# Patient Record
Sex: FEMALE | Race: WHITE
[De-identification: names, ages, dates, MRNs, and addresses within clinical notes are randomized per-mention and may not be internally consistent; named-entity substitution may affect disease eponyms.]

---

## 2019-08-19 ENCOUNTER — HOSPITAL ENCOUNTER (EMERGENCY)
Dept: HOSPITAL 97 - ER | Age: 44
Discharge: HOME | End: 2019-08-19
Payer: COMMERCIAL

## 2019-08-19 DIAGNOSIS — Z88.1: ICD-10-CM

## 2019-08-19 DIAGNOSIS — R07.9: Primary | ICD-10-CM

## 2019-08-19 DIAGNOSIS — Z91.09: ICD-10-CM

## 2019-08-19 DIAGNOSIS — Z88.6: ICD-10-CM

## 2019-08-19 DIAGNOSIS — M79.7: ICD-10-CM

## 2019-08-19 LAB
BUN BLD-MCNC: 12 MG/DL (ref 7–18)
GLUCOSE SERPLBLD-MCNC: 147 MG/DL (ref 74–106)
HCT VFR BLD CALC: 38.4 % (ref 36–45)
LYMPHOCYTES # SPEC AUTO: 3.1 K/UL (ref 0.7–4.9)
PMV BLD: 8.8 FL (ref 7.6–11.3)
POTASSIUM SERPL-SCNC: 2.9 MMOL/L (ref 3.5–5.1)
RBC # BLD: 4.84 M/UL (ref 3.86–4.86)
TROPONIN (EMERG DEPT USE ONLY): < 0.02 NG/ML (ref 0–0.04)

## 2019-08-19 PROCEDURE — 84484 ASSAY OF TROPONIN QUANT: CPT

## 2019-08-19 PROCEDURE — 96375 TX/PRO/DX INJ NEW DRUG ADDON: CPT

## 2019-08-19 PROCEDURE — 80048 BASIC METABOLIC PNL TOTAL CA: CPT

## 2019-08-19 PROCEDURE — 71045 X-RAY EXAM CHEST 1 VIEW: CPT

## 2019-08-19 PROCEDURE — 96374 THER/PROPH/DIAG INJ IV PUSH: CPT

## 2019-08-19 PROCEDURE — 99285 EMERGENCY DEPT VISIT HI MDM: CPT

## 2019-08-19 PROCEDURE — 85025 COMPLETE CBC W/AUTO DIFF WBC: CPT

## 2019-08-19 PROCEDURE — 36415 COLL VENOUS BLD VENIPUNCTURE: CPT

## 2019-08-19 PROCEDURE — 93005 ELECTROCARDIOGRAM TRACING: CPT

## 2019-08-19 NOTE — XMS REPORT
Continuity of Care Document

 Created on:2014



Patient:ABHIJIT ARCOS

Sex:Female

:1975

External Reference #:[id number]





Demographics







 Address  [address] [place] [address]

 

 Phone  [phone]

 

 Preferred Language  Unknown

 

 Marital Status  Never 

 

 Oriental orthodox Affiliation  Unknown

 

 Race  Unknown

 

 Ethnic Group  Unknown









Author







 Organization  [facility]









Care Team Providers







 Name  Role  Phone

 

 MD Rashad, Lilliam  Unavailable  Unavailable









Insurance Providers







 Payer name  Policy type /  Policy ID  Covered party ID  Policy Rich



   Coverage type      

 

 [facility]        



 [id number]        

 

 AETNA PPO PRIMARY [id number]        

 

 AETNA (PPO)        

 

 AETNA (PPO)        

 

 *SELF PAY*        

 

 AETNA (PPO)        

 

 SLIDING FEE SCHEDULE -        



 DISCOUNT        

 

 AETNA (PPO)        

 

 AETNA (PPO)        

 

 AETNA (PPO)        

 

 AETNA (PPO)        

 

 AETNA (PPO)        

 

 AETNA (PPO)        

 

 AETNA (PPO)        

 

 AETNA (PPO)        

 

 AETNA (PPO)        

 

 AETNA (PPO)        

 

 AETNA (PPO)        

 

 AETNA (PPO)        

 

 AETNA (PPO)        

 

 AETNA (PPO)        

 

 AETNA (PPO)        

 

 AETNA (PPO)        

 

 AETNA (PPO)        

 

 *SELF PAY*        

 

 *SELF PAY*        

 

 *SELF PAY*        

 

 SLIDING FEE SCHEDULE -        



 DISCOUNT        

 

 *SELF PAY*        

 

 SLIDING FEE SCHEDULE -        



 DISCOUNT        

 

 *SELF PAY*        

 

 SLIDING FEE SCHEDULE -        



 DISCOUNT        

 

 [place] ACS - TMHP        



 - TRADITIONAL        

 

 AETNA (PPO)        

 

 *SELF PAY*        

 

 SLIDING FEE SCHEDULE -        



 DISCOUNT        

 

 AETNA (PPO)        

 

 *SELF PAY*        

 

 SLIDING FEE SCHEDULE -        



 DISCOUNT        

 

 [place] ACS - TMHP        



 - TRADITIONAL        

 

 AETNA (PPO)        

 

 *SELF PAY*        

 

 SLIDING FEE SCHEDULE -        



 DISCOUNT        

 

 AETNA (PPO)        

 

 *SELF PAY*        

 

 SLIDING FEE SCHEDULE -        



 DISCOUNT        

 

 AETNA (PPO)        

 

 *SELF PAY*        

 

 SLIDING FEE SCHEDULE -        



 DISCOUNT        

 

 AETNA (PPO)        

 

 *SELF PAY*        

 

 SLIDING FEE SCHEDULE -        



 DISCOUNT        







Encounters







 Encounter  Performer  Location  Date

 

 Lab Report  Lilliam Solorzano MD  [facility] [place]  2014







Allergies, Adverse Reactions, Alerts







 Type  Substance  Reaction  Status

 

 Drug allergy  CODEINE    Active

 

 Drug allergy  IODINE    Active

 

 Drug allergy  CIPRO    Active

 

 Food allergy  SANSERT    Active

 

 Drug allergy  MYCIN    Active

 

 Drug allergy  IMITREX    Active







Problems







 Problem  Effective Dates  Problem Status

 

 SPRAIN FOOT  2013  Active

 

 FRACTURE, METATARSUS  2013  Active

 

 SPRAIN, ANKLE  2013  Active

 

 NONCOMPLIANCE W/MED TX PRS HAZARDS HLTH PERS HX  Mar 20, 2013  Active

 

 DEPRESSION    Active

 

 PREGNANCY EXAMINATION OR TEST POSITIVE RESULT  2013  Active

 

 ADVANCED MATERNAL AGE  2013  Active

 

 MIGRAINE HEADACHE  Aug 01, 2013  Active

 

 POSTPARTUM EXAMINATION  2014  Active







Procedures







 Date  Description  Comments

 

 2013  smoking status  never smoker

 

 2003  vaginal Pap smear results  Done

 

 2013  smoking status  negative

 

 Aug 01, 2013  smoking status  unknown if ever smoked

 

 Aug 27, 2013  smoking status  never smoker

 

 2014  smoking status  never smoked







Medications







 Medication  Instructions  Start Date  Status

 

 LORTAB 5-500 MG TABS  1 PO tid PRN HEADACHE  Aug 01, 2013  Inactive

 

 ZITHROMAX 250 MG TABS  Take 2 tablets (500mg) by oral route  Dec 02, 2013  
Inactive



   once daily for 1 day then 1 tablet    



   (250mg)by oral route once daily for 4    



   days.    

 

 AUGMENTIN 500-125 MG TABS  1 PO q 12 hrs  Dec 02, 2013  Inactive

 

 ZOFRAN 8 MG TABS  1 tab PO q 8hrs PRN nausea  2013  Inactive

 

 LORTAB 7.5-500 MG TABS  1 po q 6 hours prn severe headache  Aug 27, 2013  
Inactive

 

 CHIVO CONTOUR TEST STRP  Test 4 times per day.  2013  Inactive

 

 GLYBURIDE 2.5 MG TABS  1 tab PO q am  2013  Inactive







Immunizations







 Vaccine  Date  Status

 

 hepatitis B vaccine #1 given  May 10, 1994  completed

 

 hepatitis B vaccine #2 given  1994  completed

 

 hepatitis B vaccine #3  1994  completed

 

 DPT immunization #1  Mar 07, 1974  completed

 

 DPT immunization #2  May 02, 1974  completed

 

 DPT immunization #3  1974  completed

 

 DPT immunization #4  Aug 27, 1975  completed

 

 DPT immunization #5  Oct 01, 1979  completed

 

 oral polio vaccine (OPV) #1  Mar 07, 1974  completed

 

 oral polio vaccine (OPV) #2  May 02, 1974  completed

 

 oral polio vaccine (OPV) #3  1974  completed

 

 oral polio vaccine (OPV) #4  1975  completed

 

 oral polio vaccine (OPV) #5  Oct 01, 1979  completed

 

 MMR (measles, mumps, rubella) virus immunization #1  1975  completed

 

 MMR (measles, mumps, rubella) virus immunization #2  Aug 30, 1995  completed

 

 dT (Diphtheria and Tetanus) booster given  May 10, 1994  completed

 

 hepatitis B vaccine #1 given  2013  completed







Vital Signs







 Date  Description  Test  Result

 

 2013  height E&M - 8302-2  HEIGHT  62 in

 

 2013  weight E&M - 3141-9  WEIGHT  252 lb

 

 2013  temperature E&M  TEMPERATURE  98.3 deg f

 

 2013  blood pressure, systolic - 8480-6  BP SYSTOLIC  120 mm Hg

 

 2013  blood pressure, diastolic - 8462-4  BP DIASTOLIC  78 mm Hg

 

 2013  weight E&M - 3141-9  WEIGHT  252 lb

 

 2013  temperature E&M  TEMPERATURE  98.8 deg f

 

 2013  blood pressure, systolic - 8480-6  BP SYSTOLIC  120 mm Hg

 

 2013  blood pressure, diastolic - 8462-4  BP DIASTOLIC  68 mm Hg

 

 2013  weight E&M - 3141-9  WEIGHT  252 lb

 

 2013  temperature E&M  TEMPERATURE  98.6 deg f

 

 2013  blood pressure, systolic - 8480-6  BP SYSTOLIC  120 mm Hg

 

 2013  blood pressure, diastolic - 8462-4  BP DIASTOLIC  60 mm Hg

 

 2013  weight E&M - 3141-9  WEIGHT  252 lb

 

 2013  temperature E&M  TEMPERATURE  98.6 deg f

 

 2013  blood pressure, systolic - 8480-6  BP SYSTOLIC  120 mm Hg

 

 2013  blood pressure, diastolic - 8462-4  BP DIASTOLIC  60 mm Hg

 

 Mar 20, 2013  weight E&M - 3141-9  WEIGHT  248 lb

 

 Mar 20, 2013  temperature E&M  TEMPERATURE  98.5 deg f

 

 Mar 20, 2013  blood pressure, systolic - 8480-6  BP SYSTOLIC  120 mm Hg

 

 Mar 20, 2013  blood pressure, diastolic - 8462-4  BP DIASTOLIC  60 mm Hg

 

 2013  weight E&M - 3141-9  WEIGHT  243 lb

 

 2013  blood pressure, systolic - 8480-6  BP SYSTOLIC  122 mm Hg

 

 2013  blood pressure, diastolic - 8462-4  BP DIASTOLIC  70 mm Hg

 

 2013  blood pressure, systolic - 8480-6  BP SYSTOLIC  130 mm Hg

 

 2013  blood pressure, diastolic - 8462-4  BP DIASTOLIC  85 mm Hg

 

 2013  weight E&M - 3141-9  WEIGHT  248 lb

 

 2013  respiratory rate E&M - 9279-1  RESP RATE  19 /min

 

 Aug 01, 2013  weight E&M - 3141-9  WEIGHT  245 lb

 

 Aug 01, 2013  temperature E&M  TEMPERATURE  97.8 deg f

 

 Aug 01, 2013  blood pressure, systolic - 8480-6  BP SYSTOLIC  124 mm Hg

 

 Aug 01, 2013  blood pressure, diastolic - 8462-4  BP DIASTOLIC  74 mm Hg

 

 Aug 01, 2013  pulse rate E&M - 8867-4  PULSE RATE  82 /min

 

 Aug 27, 2013  weight E&M - 3141-9  WEIGHT  249.2 lb

 

 Aug 27, 2013  temperature E&M  TEMPERATURE  98.3 deg f

 

 Aug 27, 2013  pulse rate E&M - 8867-4  PULSE RATE  60 /min

 

 Aug 27, 2013  blood pressure, systolic - 8480-6  BP SYSTOLIC  104 mm Hg

 

 Aug 27, 2013  blood pressure, diastolic - 8462-4  BP DIASTOLIC  60 mm Hg

 

 2014  weight MICHELLE&M - 3141-9  WEIGHT  240 lb

 

 2014  blood pressure, systolic, sitting, left arm  BP SYS SIT L  128 
mm Hg

 

 2014  blood pressure, diastolic, sitting, left arm  BP CHRISTY SIT L  84 
mm Hg

 

 2014  blood pressure, systolic - 8480-6  BP SYSTOLIC  128 mm Hg

 

 2014  blood pressure, diastolic - 8462-4  BP DIASTOLIC  84 mm Hg







Results







 Date  Description  Test Name  Value  Reference  Interpretation  Status

 

 2013  varicella zoster  VARICELLA AB  1.03 null    High  



   antibody, IgG,          



   serum          

 

 2003  vaginal Pap smear  PAP SMEAR  Done null      



   results

## 2019-08-19 NOTE — XMS REPORT
Continuity of Care Document

 Created on:2014



Patient:ABHIJIT ARCOS

Sex:Female

:1975

External Reference #:[id number]





Demographics







 Address  [address] [place] [address]

 

 Phone  [phone]

 

 Preferred Language  Unknown

 

 Marital Status  Never 

 

 Sabianism Affiliation  Unknown

 

 Race  Unknown

 

 Ethnic Group  Unknown









Author







 Organization  [facility]









Care Team Providers







 Name  Role  Phone

 

 MD Rashad, Lillima  Unavailable  Unavailable









Insurance Providers







 Payer name  Policy type /  Policy ID  Covered party ID  Policy Rcih



   Coverage type      

 

 [facility]        



 [id number]        

 

 AETNA PPO PRIMARY [id number]        

 

 AETNA (PPO)        

 

 AETNA (PPO)        

 

 *SELF PAY*        

 

 AETNA (PPO)        

 

 SLIDING FEE SCHEDULE -        



 DISCOUNT        

 

 AETNA (PPO)        

 

 AETNA (PPO)        

 

 AETNA (PPO)        

 

 AETNA (PPO)        

 

 AETNA (PPO)        

 

 AETNA (PPO)        

 

 AETNA (PPO)        

 

 AETNA (PPO)        

 

 AETNA (PPO)        

 

 AETNA (PPO)        

 

 AETNA (PPO)        

 

 AETNA (PPO)        

 

 AETNA (PPO)        

 

 AETNA (PPO)        

 

 AETNA (PPO)        

 

 AETNA (PPO)        

 

 AETNA (PPO)        

 

 *SELF PAY*        

 

 *SELF PAY*        

 

 *SELF PAY*        

 

 SLIDING FEE SCHEDULE -        



 DISCOUNT        

 

 *SELF PAY*        

 

 SLIDING FEE SCHEDULE -        



 DISCOUNT        

 

 *SELF PAY*        

 

 SLIDING FEE SCHEDULE -        



 DISCOUNT        

 

 [place] ACS - TMHP        



 - TRADITIONAL        

 

 AETNA (PPO)        

 

 *SELF PAY*        

 

 SLIDING FEE SCHEDULE -        



 DISCOUNT        

 

 AETNA (PPO)        

 

 *SELF PAY*        

 

 SLIDING FEE SCHEDULE -        



 DISCOUNT        

 

 [place] ACS - TMHP        



 - TRADITIONAL        

 

 AETNA (PPO)        

 

 *SELF PAY*        

 

 SLIDING FEE SCHEDULE -        



 DISCOUNT        

 

 AETNA (PPO)        

 

 *SELF PAY*        

 

 SLIDING FEE SCHEDULE -        



 DISCOUNT        

 

 AETNA (PPO)        

 

 *SELF PAY*        

 

 SLIDING FEE SCHEDULE -        



 DISCOUNT        

 

 AETNA (PPO)        

 

 *SELF PAY*        

 

 SLIDING FEE SCHEDULE -        



 DISCOUNT        







Encounters







 Encounter  Performer  Location  Date

 

 Lab Report  Lilliam Solorzano MD  [facility] [place]  2014







Allergies, Adverse Reactions, Alerts







 Type  Substance  Reaction  Status

 

 Drug allergy  CODEINE    Active

 

 Drug allergy  IODINE    Active

 

 Drug allergy  CIPRO    Active

 

 Food allergy  SANSERT    Active

 

 Drug allergy  MYCIN    Active

 

 Drug allergy  IMITREX    Active







Problems







 Problem  Effective Dates  Problem Status

 

 SPRAIN FOOT  2013  Active

 

 FRACTURE, METATARSUS  2013  Active

 

 SPRAIN, ANKLE  2013  Active

 

 NONCOMPLIANCE W/MED TX PRS HAZARDS HLTH PERS HX  Mar 20, 2013  Active

 

 DEPRESSION    Active

 

 PREGNANCY EXAMINATION OR TEST POSITIVE RESULT  2013  Active

 

 ADVANCED MATERNAL AGE  2013  Active

 

 MIGRAINE HEADACHE  Aug 01, 2013  Active

 

 POSTPARTUM EXAMINATION  2014  Active







Procedures







 Date  Description  Comments

 

 2013  smoking status  never smoker

 

 2003  vaginal Pap smear results  Done

 

 2013  smoking status  negative

 

 Aug 01, 2013  smoking status  unknown if ever smoked

 

 Aug 27, 2013  smoking status  never smoker

 

 2014  smoking status  never smoked







Medications







 Medication  Instructions  Start Date  Status

 

 LORTAB 5-500 MG TABS  1 PO tid PRN HEADACHE  Aug 01, 2013  Inactive

 

 ZITHROMAX 250 MG TABS  Take 2 tablets (500mg) by oral route  Dec 02, 2013  
Inactive



   once daily for 1 day then 1 tablet    



   (250mg)by oral route once daily for 4    



   days.    

 

 AUGMENTIN 500-125 MG TABS  1 PO q 12 hrs  Dec 02, 2013  Inactive

 

 ZOFRAN 8 MG TABS  1 tab PO q 8hrs PRN nausea  2013  Inactive

 

 LORTAB 7.5-500 MG TABS  1 po q 6 hours prn severe headache  Aug 27, 2013  
Inactive

 

 CHIVO CONTOUR TEST STRP  Test 4 times per day.  2013  Inactive

 

 GLYBURIDE 2.5 MG TABS  1 tab PO q am  2013  Inactive







Immunizations







 Vaccine  Date  Status

 

 hepatitis B vaccine #1 given  May 10, 1994  completed

 

 hepatitis B vaccine #2 given  1994  completed

 

 hepatitis B vaccine #3  1994  completed

 

 DPT immunization #1  Mar 07, 1974  completed

 

 DPT immunization #2  May 02, 1974  completed

 

 DPT immunization #3  1974  completed

 

 DPT immunization #4  Aug 27, 1975  completed

 

 DPT immunization #5  Oct 01, 1979  completed

 

 oral polio vaccine (OPV) #1  Mar 07, 1974  completed

 

 oral polio vaccine (OPV) #2  May 02, 1974  completed

 

 oral polio vaccine (OPV) #3  1974  completed

 

 oral polio vaccine (OPV) #4  1975  completed

 

 oral polio vaccine (OPV) #5  Oct 01, 1979  completed

 

 MMR (measles, mumps, rubella) virus immunization #1  1975  completed

 

 MMR (measles, mumps, rubella) virus immunization #2  Aug 30, 1995  completed

 

 dT (Diphtheria and Tetanus) booster given  May 10, 1994  completed

 

 hepatitis B vaccine #1 given  2013  completed







Vital Signs







 Date  Description  Test  Result

 

 2013  height E&M - 8302-2  HEIGHT  62 in

 

 2013  weight E&M - 3141-9  WEIGHT  252 lb

 

 2013  temperature E&M  TEMPERATURE  98.3 deg f

 

 2013  blood pressure, systolic - 8480-6  BP SYSTOLIC  120 mm Hg

 

 2013  blood pressure, diastolic - 8462-4  BP DIASTOLIC  78 mm Hg

 

 2013  weight E&M - 3141-9  WEIGHT  252 lb

 

 2013  temperature E&M  TEMPERATURE  98.8 deg f

 

 2013  blood pressure, systolic - 8480-6  BP SYSTOLIC  120 mm Hg

 

 2013  blood pressure, diastolic - 8462-4  BP DIASTOLIC  68 mm Hg

 

 2013  weight E&M - 3141-9  WEIGHT  252 lb

 

 2013  temperature E&M  TEMPERATURE  98.6 deg f

 

 2013  blood pressure, systolic - 8480-6  BP SYSTOLIC  120 mm Hg

 

 2013  blood pressure, diastolic - 8462-4  BP DIASTOLIC  60 mm Hg

 

 2013  weight E&M - 3141-9  WEIGHT  252 lb

 

 2013  temperature E&M  TEMPERATURE  98.6 deg f

 

 2013  blood pressure, systolic - 8480-6  BP SYSTOLIC  120 mm Hg

 

 2013  blood pressure, diastolic - 8462-4  BP DIASTOLIC  60 mm Hg

 

 Mar 20, 2013  weight E&M - 3141-9  WEIGHT  248 lb

 

 Mar 20, 2013  temperature E&M  TEMPERATURE  98.5 deg f

 

 Mar 20, 2013  blood pressure, systolic - 8480-6  BP SYSTOLIC  120 mm Hg

 

 Mar 20, 2013  blood pressure, diastolic - 8462-4  BP DIASTOLIC  60 mm Hg

 

 2013  weight E&M - 3141-9  WEIGHT  243 lb

 

 2013  blood pressure, systolic - 8480-6  BP SYSTOLIC  122 mm Hg

 

 2013  blood pressure, diastolic - 8462-4  BP DIASTOLIC  70 mm Hg

 

 2013  blood pressure, systolic - 8480-6  BP SYSTOLIC  130 mm Hg

 

 2013  blood pressure, diastolic - 8462-4  BP DIASTOLIC  85 mm Hg

 

 2013  weight E&M - 3141-9  WEIGHT  248 lb

 

 2013  respiratory rate E&M - 9279-1  RESP RATE  19 /min

 

 Aug 01, 2013  weight E&M - 3141-9  WEIGHT  245 lb

 

 Aug 01, 2013  temperature E&M  TEMPERATURE  97.8 deg f

 

 Aug 01, 2013  blood pressure, systolic - 8480-6  BP SYSTOLIC  124 mm Hg

 

 Aug 01, 2013  blood pressure, diastolic - 8462-4  BP DIASTOLIC  74 mm Hg

 

 Aug 01, 2013  pulse rate E&M - 8867-4  PULSE RATE  82 /min

 

 Aug 27, 2013  weight E&M - 3141-9  WEIGHT  249.2 lb

 

 Aug 27, 2013  temperature E&M  TEMPERATURE  98.3 deg f

 

 Aug 27, 2013  pulse rate E&M - 8867-4  PULSE RATE  60 /min

 

 Aug 27, 2013  blood pressure, systolic - 8480-6  BP SYSTOLIC  104 mm Hg

 

 Aug 27, 2013  blood pressure, diastolic - 8462-4  BP DIASTOLIC  60 mm Hg

 

 2014  weight MICHELLE&M - 3141-9  WEIGHT  240 lb

 

 2014  blood pressure, systolic, sitting, left arm  BP SYS SIT L  128 
mm Hg

 

 2014  blood pressure, diastolic, sitting, left arm  BP CHRISTY SIT L  84 
mm Hg

 

 2014  blood pressure, systolic - 8480-6  BP SYSTOLIC  128 mm Hg

 

 2014  blood pressure, diastolic - 8462-4  BP DIASTOLIC  84 mm Hg







Results







 Date  Description  Test Name  Value  Reference  Interpretation  Status

 

 2013  varicella zoster  VARICELLA AB  1.03 null    High  



   antibody, IgG,          



   serum          

 

 2003  vaginal Pap smear  PAP SMEAR  Done null      



   results

## 2019-08-19 NOTE — XMS REPORT
Summary of Care

 Created on:2018



Patient:ABHIJIT ARCOS

Sex:Female

:1975

External Reference #:[id number]





Demographics







 Address  [address] [place] [id number]

 

 Phone  Unavailable

 

 Preferred Language  English

 

 Marital Status  Unknown

 

 Voodoo Affiliation  Unknown

 

 Race  White

 

 Ethnic Group  Not  or 









Author







 Name  Lindsey Pantoja R.N.

 

 Address  [facility]



   Unavailable



   ,









Care Team Providers







 Name  Role  Phone

 

 Lindsey Pantoja R.N.  Unavailable  Unavailable

 

 BRENTON FRY M.D.  Unavailable  Unavailable

 

 DEIDRA BERNABE, NITIN MCKEON  Unavailable  Unavailable

 

 Unavailable  Unavailable  Unavailable









Functional Status







 Name  Dates  Details

 

 Functional status health issues are not documented    Status:









 Name  Dates  Details

 

 Cognitive status health issues are not documented    Status:







Problems







 Name  Dates  Details

 

 Pelvic fracture (808.8, S32.9XXA)    Status: Active

 

 Cervical pain (neck) (723.1, M54.2)    Status: Active

 

 Cervical myelopathy (721.1, G95.9)    Status: Active

 

 IIH (idiopathic intracranial hypertension) (348.2, G93.2)    Status: Active







Medications







 Name  Dates  Details









 Zoloft 50 MG Oral Tablet



 TAKE 3 TABLET DAILY









    Refills: 0











  Start : 25-Oct-2017



Active

RisperDAL 2 MG Oral Tablet

TAKE 1 TABLET AT BEDTIME.







  Refills: 0











  Start : 25-Oct-2017



Active

Lyrica 200 MG Oral Capsule

TAKE 1 CAPSULE 3 TIMES DAILY







  Refills: 0











  Start : 25-Oct-2017



Active

Reglan TABS







  Refills: 0





Active

Fioricet -40 MG Oral Capsule

TAKE 1 CAPSULE  PRN







  Refills: 0











  Start : 25-Oct-2017



Active

Robaxin-750 750 MG Oral Tablet

TAKE 1 TABLET 3 TIMES DAILY.







  Refills: 0











  Start : 25-Oct-2017



Active

Topiramate 100 MG Oral Tablet

TAKE 1 TABLET TWICE DAILY.







  Quantity: 120   Refills: 3







ANG YINKA.EMMANUEL., BRENTON





  Start : 25-Oct-2017



Active

60 Tablet Bottle

TiZANidine HCl - 2 MG Oral Capsule

TAKE 1 TABLET BY MOUTH TWICE DAILY prn







  Quantity: 60   Refills: 4







ANG YINKA.D., BRENTON





  Start : 25-Oct-2017



Active

Cambia 50 MG Oral Packet

TAKE 2 MG Twice daily







  Refills: 0











  Start : 25-Oct-2017



Active





Allergies and Adverse Reactions







 Name  Dates  Details

 

 ciprofloxacin (Allergy)    Status: Active

 

 Imitrex (Allergy)    Status: Active

 

 Iodine SOLN (Allergy)    Status: Active

 

 Sansert (Allergy)    Status: Active







Past Medical History







 Name  Dates  Details

 

 History of asthma (V12.69, Z87.09)    Status: Resolved

 

 History of depression (V11.8, Z86.59)    Status: Resolved

 

 History of fibromyalgia (V13.59, Z87.39)    Status: Resolved

 

 History of Intracranial hypertension (348.2, G93.2)    Status: Resolved

 

 History of Migraines (346.90, G43.909)    Status: Resolved

 

 History of pneumonia (V12.61, Z87.01)    Status: Resolved

 

 History of stroke (V12.54, Z86.73)    Status: Resolved







Procedures







 Procedure  Dates  Details

 

 History of Pelvic surgery    Completed

 

 History of  section    Completed

 

 History of Tubal ligation    Completed

 

 History of Retinal detachment repair    Completed

 

 History of Gallbladder surgery    Completed

 

 History of Catheter ablation    Completed







Immunization







 Name  Dates  Details

 

 Immunizations not documented    







Family History







 Name  Dates  Details

 

 Family history of fibromyalgia (V17.89, Z82.69)    Status: Active

 

 Family history of deep venous thrombosis (V17.49, Z82.49)    Status: Active

 

 Family history of depression (V17.0, Z81.8)    Status: Active

 

 Family history of     Status: Active









 Name  Dates  Details

 

 Family history of hypertension (V17.49, Z82.49)    Status: Active







Social History







 Name  Dates  Details

 

 -    Status:









 Name  Dates  Details

 

 Never smoker    







Vital Signs







 Date  Test  Result  Details

 

       

 

   No Known Vitals to report    



       







Results







 Date  Description  Value  Details

 

   Results not documented    



       

 

       







Plan of Care







 Name  Dates  Details









 Planned Observations









 Planned Goals not documented    







Instructions







 Name  Dates  Details

 

 Instructions not documented    







Encounters







 Appointment; JAVID PANG M.D.  On: 8-Dec-2016 8:00



 Encounter Diagnosis: Problem not documented  

 

 Appointment; VIMAL ELAINE M.D.  On: 2017 10:00



 Encounter Diagnosis: Problem not documented  

 

 Appointment; BRENTON FRY M.D.  On: 25-Oct-2017 13:00



 Encounter Diagnosis: Problem not documented  

 

 Appointment; BRENTON FRY M.D.  On: 2018 13:00



 Encounter Diagnosis: Problem not documented  

 

 Appointment; VIMAL ELAINE M.D.  On: 2018 13:15



 Encounter Diagnosis: Problem not documented  

 

 Appointment; VIMAL ELAINE M.D.  On: 9-Aug-2018 13:15



 Encounter Diagnosis: Problem not documented  

 

 Appointment; VIMAL ELAINE M.D.  On: 2018 13:00



 Encounter Diagnosis: Problem not documented  

 

 Appointment; VIMAL ELAINE M.D.  On: 2018 13:00



 Encounter Diagnosis: Problem not documented

## 2019-08-19 NOTE — XMS REPORT
Continuity of Care Document

 Created on:2015



Patient:ABHIJIT ARCOS

Sex:Female

:1975

External Reference #:[id number]





Demographics







 Address  [address] [place] [address]

 

 Phone  [phone]

 

 Preferred Language  Unknown

 

 Marital Status  Never 

 

 Voodoo Affiliation  Unknown

 

 Race  Unknown

 

 Ethnic Group  Unknown









Author







 Organization  [facility]









Care Team Providers







 Name  Role  Phone

 

 LUCERO Ling Sharrone  Unavailable  Unavailable









Insurance Providers







 Payer name  Policy type /  Policy ID  Covered party ID  Policy Rich



   Coverage type      

 

 [employer] [facility]        



 [id number]        

 

 AETNA PPO PRIMARY [id number]        

 

 AETNA (PPO)        

 

 AETNA (PPO)        

 

 *SELF PAY*        

 

 AETNA (PPO)        

 

 SLIDING FEE SCHEDULE -        



 DISCOUNT        

 

 AETNA (PPO)        

 

 AETNA (PPO)        

 

 AETNA (PPO)        

 

 AETNA (PPO)        

 

 AETNA (PPO)        

 

 AETNA (PPO)        

 

 AETNA (PPO)        

 

 AETNA (PPO)        

 

 AETNA (PPO)        

 

 AETNA (PPO)        

 

 AETNA (PPO)        

 

 AETNA (PPO)        

 

 AETNA (PPO)        

 

 AETNA (PPO)        

 

 AETNA (PPO)        

 

 AETNA (PPO)        

 

 AETNA (PPO)        

 

 *SELF PAY*        

 

 *SELF PAY*        

 

 *SELF PAY*        

 

 SLIDING FEE SCHEDULE -        



 DISCOUNT        

 

 *SELF PAY*        

 

 SLIDING FEE SCHEDULE -        



 DISCOUNT        

 

 *SELF PAY*        

 

 SLIDING FEE SCHEDULE -        



 DISCOUNT        

 

 [place]        



 - TRADITIONAL        

 

 AETNA (PPO)        

 

 *SELF PAY*        

 

 SLIDING FEE SCHEDULE -        



 DISCOUNT        

 

 AETNA (PPO)        

 

 *SELF PAY*        

 

 SLIDING FEE SCHEDULE -        



 DISCOUNT        

 

 [place]        



 - TRADITIONAL        

 

 AETNA (PPO)        

 

 *SELF PAY*        

 

 SLIDING FEE SCHEDULE -        



 DISCOUNT        

 

 AETNA (PPO)        

 

 *SELF PAY*        

 

 SLIDING FEE SCHEDULE -        



 DISCOUNT        

 

 AETNA (PPO)        

 

 *SELF PAY*        

 

 SLIDING FEE SCHEDULE -        



 DISCOUNT        

 

 AETNA (PPO)        

 

 *SELF PAY*        

 

 SLIDING FEE SCHEDULE -        



 DISCOUNT        

 

 AETNA (PPO)        

 

 *SELF PAY*        

 

 SLIDING FEE SCHEDULE -        



 DISCOUNT        

 

 AETNA (PPO)        

 

 *SELF PAY*        

 

 SLIDING FEE SCHEDULE -        



 DISCOUNT        

 

 AETNA (PPO)        

 

 *SELF PAY*        

 

 SLIDING FEE SCHEDULE -        



 DISCOUNT        

 

 [facility]        



 [employer] [place]        



 (        

 

 AETNA (PPO)        

 

 *SELF PAY*        

 

 SLIDING FEE SCHEDULE -        



 DISCOUNT        

 

 [facility] [place]        



 (        

 

 AETNA (PPO)        

 

 *SELF PAY*        

 

 SLIDING FEE SCHEDULE -        



 DISCOUNT        

 

 AETNA (PPO)        

 

 *SELF PAY*        

 

 SLIDING FEE SCHEDULE -        



 DISCOUNT        

 

 AETNA (PPO)        

 

 *SELF PAY*        

 

 SLIDING FEE SCHEDULE -        



 DISCOUNT        

 

 AETNA (PPO)        

 

 *SELF PAY*        

 

 SLIDING FEE SCHEDULE -        



 DISCOUNT        

 

 [place]        



 - TRADITIONAL        

 

 AETNA (PPO)        

 

 *SELF PAY*        

 

 SLIDING FEE SCHEDULE -        



 DISCOUNT        

 

 [place]        



 - TRADITIONAL        

 

 AETNA (PPO)        

 

 *SELF PAY*        

 

 SLIDING FEE SCHEDULE -        



 DISCOUNT        

 

 [place]        



 - TRADITIONAL        

 

 AETNA (PPO)        

 

 *SELF PAY*        

 

 SLIDING FEE SCHEDULE -        



 DISCOUNT        

 

 AETNA (PPO)        

 

 [place]        



 - TRADITIONAL        

 

 *SELF PAY*        

 

 SLIDING FEE SCHEDULE -        



 DISCOUNT        

 

 [place]        



 - TRADITIONAL        

 

 *SELF PAY*        

 

 SLIDING FEE SCHEDULE -        



 DISCOUNT        

 

 [place]        



 - TRADITIONAL        

 

 *SELF PAY*        

 

 SLIDING FEE SCHEDULE -        



 DISCOUNT        

 

 [place]        



 - TRADITIONAL        

 

 *SELF PAY*        

 

 SLIDING FEE SCHEDULE -        



 DISCOUNT        

 

 [place]        



 - TRADITIONAL        

 

 *SELF PAY*        

 

 SLIDING FEE SCHEDULE -        



 DISCOUNT        

 

 [place]        



 - TRADITIONAL        

 

 *SELF PAY*        

 

 [place]        



 - TRADITIONAL        

 

 *SELF PAY*        

 

 SLIDING FEE SCHEDULE -        



 DISCOUNT        

 

 [place]        



 - TRADITIONAL        

 

 *SELF PAY*        

 

 SLIDING FEE SCHEDULE -        



 DISCOUNT        

 

 [place]        



 - TRADITIONAL        

 

 *SELF PAY*        

 

 SLIDING FEE SCHEDULE -        



 DISCOUNT        

 

 [place]        



 - TRADITIONAL        

 

 *SELF PAY*        

 

 SLIDING FEE SCHEDULE -        



 DISCOUNT        

 

 [place]        



 - TRADITIONAL        

 

 *SELF PAY*        

 

 SLIDING FEE SCHEDULE -        



 DISCOUNT        

 

 [place]        



 - TRADITIONAL        

 

 *SELF PAY*        

 

 SLIDING FEE SCHEDULE -        



 DISCOUNT        

 

 [place]        



 - TRADITIONAL        

 

 *SELF PAY*        

 

 SLIDING FEE SCHEDULE -        



 DISCOUNT        

 

 [place]        



 - TRADITIONAL        

 

 *SELF PAY*        

 

 SLIDING FEE SCHEDULE -        



 DISCOUNT        

 

 [place] - [place]        



 - TRADITIONAL        

 

 *SELF PAY*        

 

 SLIDING FEE SCHEDULE -        



 DISCOUNT        

 

 [place] ACS - [place]        



 - TRADITIONAL        

 

 *SELF PAY*        

 

 SLIDING FEE SCHEDULE -        



 DISCOUNT        

 

 [place] - [place]        



 - TRADITIONAL        

 

 *SELF PAY*        

 

 SLIDING FEE SCHEDULE -        



 DISCOUNT        

 

 [place] ACS - [place]        



 - TRADITIONAL        

 

 *SELF PAY*        

 

 SLIDING FEE SCHEDULE -        



 DISCOUNT        

 

 [place] - [place]        



 - TRADITIONAL        

 

 *SELF PAY*        

 

 SLIDING FEE SCHEDULE -        



 DISCOUNT        

 

 [place] ACS - [place]        



 - TRADITIONAL        

 

 *SELF PAY*        

 

 SLIDING FEE SCHEDULE -        



 DISCOUNT        

 

 [place] ACS - [place]        



 - TRADITIONAL        

 

 *SELF PAY*        

 

 SLIDING FEE SCHEDULE -        



 DISCOUNT        

 

 [place] - [place]        



 - TRADITIONAL        

 

 *SELF PAY*        

 

 SLIDING FEE SCHEDULE -        



 DISCOUNT        

 

 [place] - [place]        



 - TRADITIONAL        

 

 *SELF PAY*        

 

 SLIDING FEE SCHEDULE -        



 DISCOUNT        

 

 AETNA (PPO)        

 

 [place] ACS - [place]        



 - TRADITIONAL        

 

 *SELF PAY*        

 

 SLIDING FEE SCHEDULE -        



 DISCOUNT        

 

 AETNA (PPO)        

 

 [place] ACS - [place]        



 - TRADITIONAL        

 

 *SELF PAY*        

 

 SLIDING FEE SCHEDULE -        



 DISCOUNT        

 

 AETNA (PPO)        

 

 [place] ACS - [place]        



 - TRADITIONAL        

 

 *SELF PAY*        

 

 SLIDING FEE SCHEDULE -        



 DISCOUNT        

 

 AETNA (PPO)        

 

 [place] ACS - TM[place]        



 - TRADITIONAL        

 

 *SELF PAY*        

 

 SLIDING FEE SCHEDULE -        



 DISCOUNT        

 

 AETNA (PPO)        

 

 [place] ACS - [place]        



 - TRADITIONAL        

 

 *SELF PAY*        

 

 SLIDING FEE SCHEDULE -        



 DISCOUNT        

 

 AETNA (PPO)        

 

 [place]        



 - TRADITIONAL        

 

 *SELF PAY*        

 

 SLIDING FEE SCHEDULE -        



 DISCOUNT        







Encounters







 Encounter  Performer  Location  Date

 

 Office Visit  Dewey Ling PA-C  [facility]  



     Practice  







Allergies, Adverse Reactions, Alerts







 Type  Substance  Reaction  Status

 

 Drug allergy  CODEINE    Active

 

 Drug allergy  IODINE    Active

 

 Drug allergy  CIPRO    Active

 

 Food allergy  SANSERT    Active

 

 Drug allergy  MYCIN    Active

 

 Drug allergy  IMITREX    Active







Problems







 Problem  Effective Dates  Problem Status

 

 SPRAIN FOOT  2013  Inactive

 

 FRACTURE, METATARSUS  2013  Inactive

 

 SPRAIN, ANKLE  2013  Inactive

 

 NONCOMPLIANCE W/MED TX PRS HAZARDS HLTH PERS HX  Mar 20, 2013  Active

 

 DEPRESSION    Active

 

 PREGNANCY EXAMINATION OR TEST POSITIVE RESULT  2013  Inactive

 

 ADVANCED MATERNAL AGE  2013  Active

 

 MIGRAINE HEADACHE  Aug 01, 2013  Active

 

 POSTPARTUM EXAMINATION  2014  Inactive

 

 UNSPECIFIED DISORDER OF REFRACTION&ACCOMMODATION  Aug 25, 2014  Active

 

 FITTING&ADJUSTMENT OF SPECTACLES&CONTACT LENSES  Aug 25, 2014  Active

 

 MIGRAINE  Aug 25, 2014  Active

 

 ALLERGIC RHINITIS  2014  Active

 

 PELVIC PAIN  2015  Active







Procedures







 Date  Description  Comments

 

 2013  smoking status  never smoker

 

 2003  vaginal Pap smear results  Done

 

 2013  smoking status  negative

 

 Aug 01, 2013  smoking status  unknown if ever smoked

 

 Aug 27, 2013  smoking status  never smoker

 

 2014  smoking status  Never smoker

 

 May 29, 2014  smoking status  unknown if ever smoked

 

 2014  smoking status  Unknown if ever smoked

 

 2014  smoking status  Unknown if ever smoked

 

 2015  smoking status  Unknown if ever smoked

 

 2015  smoking status  Never smoker







Medications







 Medication  Instructions  Start Date  Status

 

 LORTAB 5-500 MG TABS  1 PO tid PRN HEADACHE  Aug 01, 2013  Inactive

 

 ZITHROMAX 250 MG TABS  Take 2 tablets (500mg) by oral  Dec 02, 2013  Inactive



   route once daily for 1 day then    



   1 tablet (250mg)by oral route    



   once daily for 4 days.    

 

 AUGMENTIN 500-125 MG TABS  1 PO q 12 hrs  Dec 02, 2013  Inactive

 

 ZOFRAN 8 MG TABS  1 tab PO q 8hrs PRN nausea  2013  Inactive

 

 LORTAB 7.5-500 MG TABS  1 po q 6 hours prn severe  Aug 27, 2013  Inactive



   headache    

 

 CHIVO CONTOUR TEST STRP  Test 4 times per day.  2013  Inactive

 

 GLYBURIDE 2.5 MG TABS  1 tab PO q am  2013  Inactive

 

 CYCLOBENZAPRINE HCL 10 MG TABS  1 po TID prn  May 29, 2014  Inactive

 

 PROMETHAZINE HCL 25 MG TABS  1 PO Q 6 HOURS AS NEEDED  May 29, 2014  Inactive

 

 BUPAP  MG TABS  1 OR 2 PO Q 4 HOURS AS NEEDED;  May 29, 2014  Inactive



   NOT TO EXCEED 6 TABS PER DAY    

 

 CITRANATAL ASSURE 300 MG MISC  1 of each q day  2014  Inactive

 

 PROMETHAZINE HCL 25 MG TABS  1 PO Q 4 HOURS AS NEEDED FOR  2014  
Inactive



   NAUSEA/VOMITING    

 

 CYCLOBENZAPRINE HCL 10 MG TABS  1 PO TID PRN  2014  Inactive

 

 CAMBIA 50 MG PACK  DISSOLVE ONE PACKET IN 1 OUNCE  2014  Inactive



   OF WATER AND DRINK IMMEDIATELY    



   AT ONSET OF HEADACHE    

 

 PROZAC 40 MG CAPS  take one daily  2014  Active

 

 WELLBUTRIN  MG XR24H-TAB  take one daily  2014  Active

 

 ABILIFY 2 MG TABS    2015  Active

 

 TRAMADOL HCL 50 MG TABS  1 or 2 po qid prn pain  2015  Inactive

 

 PROMETHAZINE HCL 25 MG TABS  1 PO Q 4 HOURS AS NEEDED FOR  2015  
Inactive



   NAUSEA    

 

 CAMBIA 50 MG PACK  Dissolve 1 pack in one ounce of  2015  Inactive



   water and drink immediately    

 

 TRAMADOL HCL 50 MG TABS  1 po q 6hrs prn pain  2015  Active







Immunizations







 Vaccine  Date  Status

 

 hepatitis B vaccine #1 given  May 10, 1994  completed

 

 hepatitis B vaccine #2 given  1994  completed

 

 hepatitis B vaccine #3  1994  completed

 

 DPT immunization #1  Mar 07, 1974  completed

 

 DPT immunization #2  May 02, 1974  completed

 

 DPT immunization #3  1974  completed

 

 DPT immunization #4  Aug 27, 1975  completed

 

 DPT immunization #5  Oct 01, 1979  completed

 

 oral polio vaccine (OPV) #1  Mar 07, 1974  completed

 

 oral polio vaccine (OPV) #2  May 02, 1974  completed

 

 oral polio vaccine (OPV) #3  1974  completed

 

 oral polio vaccine (OPV) #4  1975  completed

 

 oral polio vaccine (OPV) #5  Oct 01, 1979  completed

 

 MMR (measles, mumps, rubella) virus immunization #1  1975  completed

 

 MMR (measles, mumps, rubella) virus immunization #2  Aug 30, 1995  completed

 

 dT (Diphtheria and Tetanus) booster given  May 10, 1994  completed

 

 hepatitis B vaccine #1 given  2013  completed







Vital Signs







 Date  Description  Test  Result

 

 2013  height E&M - 8302-2  HEIGHT  62 in

 

 2013  weight E&M - 3141-9  WEIGHT  252 lb

 

 2013  temperature E&M  TEMPERATURE  98.3 deg f

 

 2013  blood pressure, systolic - 8480-6  BP SYSTOLIC  120 mm Hg

 

 2013  blood pressure, diastolic - 8462-4  BP DIASTOLIC  78 mm Hg

 

 2013  weight E&M - 3141-9  WEIGHT  252 lb

 

 2013  temperature E&M  TEMPERATURE  98.8 deg f

 

 2013  blood pressure, systolic - 8480-6  BP SYSTOLIC  120 mm Hg

 

 2013  blood pressure, diastolic - 8462-4  BP DIASTOLIC  68 mm Hg

 

 2013  weight E&M - 3141-9  WEIGHT  252 lb

 

 2013  temperature E&M  TEMPERATURE  98.6 deg f

 

 2013  blood pressure, systolic - 8480-6  BP SYSTOLIC  120 mm Hg

 

 2013  blood pressure, diastolic - 8462-4  BP DIASTOLIC  60 mm Hg

 

 2013  weight E&M - 3141-9  WEIGHT  252 lb

 

 2013  temperature E&M  TEMPERATURE  98.6 deg f

 

 2013  blood pressure, systolic - 8480-6  BP SYSTOLIC  120 mm Hg

 

 2013  blood pressure, diastolic - 8462-4  BP DIASTOLIC  60 mm Hg

 

 Mar 20, 2013  weight E&M - 3141-9  WEIGHT  248 lb

 

 Mar 20, 2013  temperature E&M  TEMPERATURE  98.5 deg f

 

 Mar 20, 2013  blood pressure, systolic - 8480-6  BP SYSTOLIC  120 mm Hg

 

 Mar 20, 2013  blood pressure, diastolic - 8462-4  BP DIASTOLIC  60 mm Hg

 

 2013  weight E&M - 3141-9  WEIGHT  243 lb

 

 2013  blood pressure, systolic - 8480-6  BP SYSTOLIC  122 mm Hg

 

 2013  blood pressure, diastolic - 8462-4  BP DIASTOLIC  70 mm Hg

 

 2013  blood pressure, systolic - 8480-6  BP SYSTOLIC  130 mm Hg

 

 2013  blood pressure, diastolic - 8462-4  BP DIASTOLIC  85 mm Hg

 

 2013  weight E&M - 3141-9  WEIGHT  248 lb

 

 2013  respiratory rate E&M - 9279-1  RESP RATE  19 /min

 

 Aug 01, 2013  weight E&M - 3141-9  WEIGHT  245 lb

 

 Aug 01, 2013  temperature E&M  TEMPERATURE  97.8 deg f

 

 Aug 01, 2013  blood pressure, systolic - 8480-6  BP SYSTOLIC  124 mm Hg

 

 Aug 01, 2013  blood pressure, diastolic - 8462-4  BP DIASTOLIC  74 mm Hg

 

 Aug 01, 2013  pulse rate E&M - 8867-4  PULSE RATE  82 /min

 

 Aug 27, 2013  weight E&M - 3141-9  WEIGHT  249.2 lb

 

 Aug 27, 2013  temperature E&M  TEMPERATURE  98.3 deg f

 

 Aug 27, 2013  pulse rate E&M - 8867-4  PULSE RATE  60 /min

 

 Aug 27, 2013  blood pressure, systolic - 8480-6  BP SYSTOLIC  104 mm Hg

 

 Aug 27, 2013  blood pressure, diastolic - 8462-4  BP DIASTOLIC  60 mm Hg

 

 2014  weight E&M - 3141-9  WEIGHT  240 lb

 

 2014  blood pressure, systolic, sitting, left arm  BP SYS SIT L  128 
mm Hg

 

 2014  blood pressure, diastolic, sitting, left arm  BP CHRISTY SIT L  84 
mm Hg

 

 2014  blood pressure, systolic - 8480-6  BP SYSTOLIC  128 mm Hg

 

 2014  blood pressure, diastolic - 8462-4  BP DIASTOLIC  84 mm Hg

 

 Mar 18, 2014  weight E&M - 3141-9  WEIGHT  255 lb

 

 Mar 18, 2014  blood pressure, systolic - 8480-6  BP SYSTOLIC  125 mm Hg

 

 Mar 18, 2014  blood pressure, diastolic - 8462-4  BP DIASTOLIC  87 mm Hg

 

 May 29, 2014  weight E&M - 3141-9  WEIGHT  251 lb

 

 May 29, 2014  temperature E&M  TEMPERATURE  98.5 deg f

 

 May 29, 2014  pulse rate E&M - 8867-4  PULSE RATE  91 /min

 

 May 29, 2014  blood pressure, systolic - 8480-6  BP SYSTOLIC  129 mm Hg

 

 May 29, 2014  blood pressure, diastolic - 8462-4  BP DIASTOLIC  85 mm Hg

 

 May 29, 2014  respiratory rate E&M - 9279-1  RESP RATE  10 /min

 

 2014  weight E&M - 3141-9  WEIGHT  255 lb

 

 2014  temperature E&M  TEMPERATURE  99.1 deg f

 

 2014  blood pressure, systolic - 8480-6  BP SYSTOLIC  128 mm Hg

 

 2014  blood pressure, diastolic - 8462-4  BP DIASTOLIC  85 mm Hg

 

 2014  pulse rate E&M - 8867-4  PULSE RATE  88 /min

 

 2014  respiratory rate E&M - 9279-1  RESP RATE  10 /min

 

 2014  temperature E&M  TEMPERATURE  98.4 deg f

 

 2014  blood pressure, systolic - 8480-6  BP SYSTOLIC  131 mm Hg

 

 2014  blood pressure, diastolic - 8462-4  BP DIASTOLIC  80 mm Hg

 

 2014  pulse rate E&M - 8867-4  PULSE RATE  80 /min

 

 2014  height E&M - 8302-2  HEIGHT  62 in

 

 2014  respiratory rate E&M - 9279-1  RESP RATE  12 /min

 

 2015  weight E&M - 3141-9  WEIGHT  257 lb

 

 2015  pulse rate E&M - 8867-4  PULSE RATE  83 /min

 

 2015  blood pressure, systolic - 8480-6  BP SYSTOLIC  114 mm Hg

 

 2015  blood pressure, diastolic - 8462-4  BP DIASTOLIC  69 mm Hg

 

 2015  temperature E&M  TEMPERATURE  98.8 deg f

 

 2015  height E&M - 8302-2  HEIGHT  62 in

 

 2015  respiratory rate E&M - 9279-1  RESP RATE  10 /min

 

 2015  weight E&M - 3141-9  WEIGHT  259 lb

 

 2015  blood pressure, systolic - 8480-6  BP SYSTOLIC  129 mm Hg

 

 2015  blood pressure, diastolic - 8462-4  BP DIASTOLIC  80 mm Hg

 

 2015  temperature E&M  TEMPERATURE  99.1 deg f

 

 2015  pulse rate E&M - 8867-4  PULSE RATE  88 /min

 

 2015  respiratory rate E&M - 9279-1  RESP RATE  16 /min







Results







 Date  Description  Test Name  Value  Reference  Interpretation  Status

 

 2013  varicella zoster  VARICELLA AB  1.03 null    High  



   antibody, IgG,          



   serum          

 

 2003  vaginal Pap smear  PAP SMEAR  Done null      



   results

## 2019-08-19 NOTE — XMS REPORT
Continuity of Care Document

 Created on:May 29, 2014



Patient:ABHIJIT ARCOS

Sex:Female

:1975

External Reference #:[id number]





Demographics







 Address  [address] [place] [id number]

 

 Phone  [phone]

 

 Preferred Language  Unknown

 

 Marital Status  Never 

 

 Mormonism Affiliation  Unknown

 

 Race  Unknown

 

 Ethnic Group  Unknown









Author







 Organization  [facility]









Care Team Providers







 Name  Role  Phone

 

 MD Rashad, Lilliam  Unavailable  Unavailable









Insurance Providers







 Payer name  Policy type /  Policy ID  Covered party ID  Policy Rich



   Coverage type      

 

 [facility]        



 [id number]        

 

 AETNA PPO PRIMARY [id number]        

 

 AETNA (PPO)        

 

 AETNA (PPO)        

 

 *SELF PAY*        

 

 AETNA (PPO)        

 

 SLIDING FEE SCHEDULE -        



 DISCOUNT        

 

 AETNA (PPO)        

 

 AETNA (PPO)        

 

 AETNA (PPO)        

 

 AETNA (PPO)        

 

 AETNA (PPO)        

 

 AETNA (PPO)        

 

 AETNA (PPO)        

 

 AETNA (PPO)        

 

 AETNA (PPO)        

 

 AETNA (PPO)        

 

 AETNA (PPO)        

 

 AETNA (PPO)        

 

 AETNA (PPO)        

 

 AETNA (PPO)        

 

 AETNA (PPO)        

 

 AETNA (PPO)        

 

 AETNA (PPO)        

 

 *SELF PAY*        

 

 *SELF PAY*        

 

 *SELF PAY*        

 

 SLIDING FEE SCHEDULE -        



 DISCOUNT        

 

 *SELF PAY*        

 

 SLIDING FEE SCHEDULE -        



 DISCOUNT        

 

 *SELF PAY*        

 

 SLIDING FEE SCHEDULE -        



 DISCOUNT        

 

 [place] ACS [place] TM[place]        



 - TRADITIONAL        

 

 AETNA (PPO)        

 

 *SELF PAY*        

 

 SLIDING FEE SCHEDULE -        



 DISCOUNT        

 

 AETNA (PPO)        

 

 *SELF PAY*        

 

 SLIDING FEE SCHEDULE -        



 DISCOUNT        

 

 [place] ACS [place] TMHP        



 - TRADITIONAL        

 

 AETNA (PPO)        

 

 *SELF PAY*        

 

 SLIDING FEE SCHEDULE -        



 DISCOUNT        

 

 AETNA (PPO)        

 

 *SELF PAY*        

 

 SLIDING FEE SCHEDULE -        



 DISCOUNT        

 

 AETNA (PPO)        

 

 *SELF PAY*        

 

 SLIDING FEE SCHEDULE -        



 DISCOUNT        

 

 AETNA (PPO)        

 

 *SELF PAY*        

 

 SLIDING FEE SCHEDULE -        



 DISCOUNT        

 

 AETNA (PPO)        

 

 *SELF PAY*        

 

 SLIDING FEE SCHEDULE -        



 DISCOUNT        

 

 AETNA (PPO)        

 

 *SELF PAY*        

 

 SLIDING FEE SCHEDULE -        



 DISCOUNT        

 

 AETNA (PPO)        

 

 *SELF PAY*        

 

 SLIDING FEE SCHEDULE -        



 DISCOUNT        

 

 [facility]        



 [employer] [place]        



 (        

 

 AETNA (PPO)        

 

 *SELF PAY*        

 

 SLIDING FEE SCHEDULE -        



 DISCOUNT        

 

 [facility] [place]        



 (        

 

 AETNA (PPO)        

 

 *SELF PAY*        

 

 SLIDING FEE SCHEDULE -        



 DISCOUNT        

 

 AETNA (PPO)        

 

 *SELF PAY*        

 

 SLIDING FEE SCHEDULE -        



 DISCOUNT        

 

 AETNA (PPO)        

 

 *SELF PAY*        

 

 SLIDING FEE SCHEDULE -        



 DISCOUNT        

 

 AETNA (PPO)        

 

 *SELF PAY*        

 

 SLIDING FEE SCHEDULE -        



 DISCOUNT        

 

 [place] ACS - TM[place]        



 - TRADITIONAL        

 

 AETNA (PPO)        

 

 *SELF PAY*        

 

 SLIDING FEE SCHEDULE -        



 DISCOUNT        

 

 [place] ACS [place]        



 - TRADITIONAL        

 

 AETNA (PPO)        

 

 *SELF PAY*        

 

 SLIDING FEE SCHEDULE -        



 DISCOUNT        

 

 [place]        



 - TRADITIONAL        

 

 AETNA (PPO)        

 

 *SELF PAY*        

 

 SLIDING FEE SCHEDULE -        



 DISCOUNT        

 

 AETNA (PPO)        

 

 [place] ACS - TM[place]        



 - TRADITIONAL        

 

 *SELF PAY*        

 

 SLIDING FEE SCHEDULE -        



 DISCOUNT        

 

 [place] ACS - TM[place]        



 - TRADITIONAL        

 

 *SELF PAY*        

 

 SLIDING FEE SCHEDULE -        



 DISCOUNT        







Encounters







 Encounter  Performer  Location  Date

 

 Office Visit  Lilliam Solorzano MD  [facility]  May 29, 
2014



     Practice  







Allergies, Adverse Reactions, Alerts







 Type  Substance  Reaction  Status

 

 Drug allergy  CODEINE    Active

 

 Drug allergy  IODINE    Active

 

 Drug allergy  CIPRO    Active

 

 Food allergy  SANSERT    Active

 

 Drug allergy  MYCIN    Active

 

 Drug allergy  IMITREX    Active







Problems







 Problem  Effective Dates  Problem Status

 

 SPRAIN FOOT  2013  Active

 

 FRACTURE, METATARSUS  2013  Active

 

 SPRAIN, ANKLE  2013  Active

 

 NONCOMPLIANCE W/MED TX PRS HAZARDS HLTH PERS HX  Mar 20, 2013  Active

 

 DEPRESSION    Active

 

 PREGNANCY EXAMINATION OR TEST POSITIVE RESULT  2013  Active

 

 ADVANCED MATERNAL AGE  2013  Active

 

 MIGRAINE HEADACHE  Aug 01, 2013  Active

 

 POSTPARTUM EXAMINATION  2014  Active







Procedures







 Date  Description  Comments

 

 2013  smoking status  never smoker

 

 2003  vaginal Pap smear results  Done

 

 2013  smoking status  negative

 

 Aug 01, 2013  smoking status  unknown if ever smoked

 

 Aug 27, 2013  smoking status  never smoker

 

 2014  smoking status  never smoked

 

 May 29, 2014  smoking status  unknown if ever smoked







Medications







 Medication  Instructions  Start Date  Status

 

 LORTAB 5-500 MG TABS  1 PO tid PRN HEADACHE  Aug 01, 2013  Inactive

 

 ZITHROMAX 250 MG TABS  Take 2 tablets (500mg) by oral  Dec 02, 2013  Inactive



   route once daily for 1 day then    



   1 tablet (250mg)by oral route    



   once daily for 4 days.    

 

 AUGMENTIN 500-125 MG TABS  1 PO q 12 hrs  Dec 02, 2013  Inactive

 

 ZOFRAN 8 MG TABS  1 tab PO q 8hrs PRN nausea  2013  Inactive

 

 LORTAB 7.5-500 MG TABS  1 po q 6 hours prn severe  Aug 27, 2013  Inactive



   headache    

 

 CHIVO CONTOUR TEST STRP  Test 4 times per day.  2013  Inactive

 

 GLYBURIDE 2.5 MG TABS  1 tab PO q am  2013  Inactive

 

 CYCLOBENZAPRINE HCL 10 MG TABS  1 po TID prn  May 29, 2014  Active

 

 PROMETHAZINE HCL 25 MG TABS  1 PO Q 6 HOURS AS NEEDED  May 29, 2014  Active

 

 BUPAP  MG TABS  1 OR 2 PO Q 4 HOURS AS NEEDED;  May 29, 2014  Active



   NOT TO EXCEED 6 TABS PER DAY    







Immunizations







 Vaccine  Date  Status

 

 hepatitis B vaccine #1 given  May 10, 1994  completed

 

 hepatitis B vaccine #2 given  1994  completed

 

 hepatitis B vaccine #3  1994  completed

 

 DPT immunization #1  Mar 07, 1974  completed

 

 DPT immunization #2  May 02, 1974  completed

 

 DPT immunization #3  1974  completed

 

 DPT immunization #4  Aug 27, 1975  completed

 

 DPT immunization #5  Oct 01, 1979  completed

 

 oral polio vaccine (OPV) #1  Mar 07, 1974  completed

 

 oral polio vaccine (OPV) #2  May 02, 1974  completed

 

 oral polio vaccine (OPV) #3  1974  completed

 

 oral polio vaccine (OPV) #4  1975  completed

 

 oral polio vaccine (OPV) #5  Oct 01, 1979  completed

 

 MMR (measles, mumps, rubella) virus immunization #1  1975  completed

 

 MMR (measles, mumps, rubella) virus immunization #2  Aug 30, 1995  completed

 

 dT (Diphtheria and Tetanus) booster given  May 10, 1994  completed

 

 hepatitis B vaccine #1 given  2013  completed







Vital Signs







 Date  Description  Test  Result

 

 2013  height E&M - 8302-2  HEIGHT  62 in

 

 2013  weight E&M - 3141-9  WEIGHT  252 lb

 

 2013  temperature E&M  TEMPERATURE  98.3 deg f

 

 2013  blood pressure, systolic - 8480-6  BP SYSTOLIC  120 mm Hg

 

 2013  blood pressure, diastolic - 8462-4  BP DIASTOLIC  78 mm Hg

 

 2013  weight E&M - 3141-9  WEIGHT  252 lb

 

 2013  temperature E&M  TEMPERATURE  98.8 deg f

 

 2013  blood pressure, systolic - 8480-6  BP SYSTOLIC  120 mm Hg

 

 2013  blood pressure, diastolic - 8462-4  BP DIASTOLIC  68 mm Hg

 

 2013  weight E&M - 3141-9  WEIGHT  252 lb

 

 2013  temperature E&M  TEMPERATURE  98.6 deg f

 

 2013  blood pressure, systolic - 8480-6  BP SYSTOLIC  120 mm Hg

 

 2013  blood pressure, diastolic - 8462-4  BP DIASTOLIC  60 mm Hg

 

 2013  weight E&M - 3141-9  WEIGHT  252 lb

 

 2013  temperature E&M  TEMPERATURE  98.6 deg f

 

 2013  blood pressure, systolic - 8480-6  BP SYSTOLIC  120 mm Hg

 

 2013  blood pressure, diastolic - 8462-4  BP DIASTOLIC  60 mm Hg

 

 Mar 20, 2013  weight E&M - 3141-9  WEIGHT  248 lb

 

 Mar 20, 2013  temperature E&M  TEMPERATURE  98.5 deg f

 

 Mar 20, 2013  blood pressure, systolic - 8480-6  BP SYSTOLIC  120 mm Hg

 

 Mar 20, 2013  blood pressure, diastolic - 8462-4  BP DIASTOLIC  60 mm Hg

 

 2013  weight E&M - 3141-9  WEIGHT  243 lb

 

 2013  blood pressure, systolic - 8480-6  BP SYSTOLIC  122 mm Hg

 

 2013  blood pressure, diastolic - 8462-4  BP DIASTOLIC  70 mm Hg

 

 2013  blood pressure, systolic - 8480-6  BP SYSTOLIC  130 mm Hg

 

 2013  blood pressure, diastolic - 8462-4  BP DIASTOLIC  85 mm Hg

 

 2013  weight E&M - 3141-9  WEIGHT  248 lb

 

 2013  respiratory rate E&M - 9279-1  RESP RATE  19 /min

 

 Aug 01, 2013  weight E&M - 3141-9  WEIGHT  245 lb

 

 Aug 01, 2013  temperature E&M  TEMPERATURE  97.8 deg f

 

 Aug 01, 2013  blood pressure, systolic - 8480-6  BP SYSTOLIC  124 mm Hg

 

 Aug 01, 2013  blood pressure, diastolic - 8462-4  BP DIASTOLIC  74 mm Hg

 

 Aug 01, 2013  pulse rate E&M - 8867-4  PULSE RATE  82 /min

 

 Aug 27, 2013  weight E&M - 3141-9  WEIGHT  249.2 lb

 

 Aug 27, 2013  temperature E&M  TEMPERATURE  98.3 deg f

 

 Aug 27, 2013  pulse rate E&M - 8867-4  PULSE RATE  60 /min

 

 Aug 27, 2013  blood pressure, systolic - 8480-6  BP SYSTOLIC  104 mm Hg

 

 Aug 27, 2013  blood pressure, diastolic - 8462-4  BP DIASTOLIC  60 mm Hg

 

 2014  weight E&YINKA - 3141-9  WEIGHT  240 lb

 

 2014  blood pressure, systolic, sitting, left arm  BP SYS SIT L  128 
mm Hg

 

 2014  blood pressure, diastolic, sitting, left arm  BP CHRISTY SIT L  84 
mm Hg

 

 2014  blood pressure, systolic - 8480-6  BP SYSTOLIC  128 mm Hg

 

 2014  blood pressure, diastolic - 8462-4  BP DIASTOLIC  84 mm Hg

 

 Mar 18, 2014  weight E&YINKA - 3141-9  WEIGHT  255 lb

 

 Mar 18, 2014  blood pressure, systolic - 8480-6  BP SYSTOLIC  125 mm Hg

 

 Mar 18, 2014  blood pressure, diastolic - 8462-4  BP DIASTOLIC  87 mm Hg

 

 May 29, 2014  weight TEOFILO - 3141-9  WEIGHT  251 lb

 

 May 29, 2014  temperature E&M  TEMPERATURE  98.5 deg f

 

 May 29, 2014  pulse rate E&M - 8867-4  PULSE RATE  91 /min

 

 May 29, 2014  blood pressure, systolic - 8480-6  BP SYSTOLIC  129 mm Hg

 

 May 29, 2014  blood pressure, diastolic - 8462-4  BP DIASTOLIC  85 mm Hg

 

 May 29, 2014  respiratory rate E&M - 9279-1  RESP RATE  10 /min







Results







 Date  Description  Test Name  Value  Reference  Interpretation  Status

 

 2013  varicella zoster  VARICELLA AB  1.03 null    High  



   antibody, IgG,          



   serum          

 

 2003  vaginal Pap smear  PAP SMEAR  Done null      



   results

## 2019-08-19 NOTE — ER
Nurse's Notes                                                                                     

 [facility]                                                                 

Name: Karla Oliva                                                                                  

Age: 43 yrs                                                                                       

Sex: Female                                                                                       

: 1975                                                                                   

MRN: [id number]                                                                                   

Arrival Date: 2019                                                                          

Time: 10:42                                                                                       

Account#: [id number]                                                                            

Bed 20                                                                                            

Private MD:                                                                                       

Diagnosis: Chest pain, unspecified                                                                

                                                                                                  

Presentation:                                                                                     

                                                                                             

10:47 Presenting complaint: Patient states: left side chest pain, radiates to left arm and up iw  

      left jaw, feels dizzy, light headed, nauseated, X 1hour. Transition of care: patient        

      was not received from another setting of care. Onset of symptoms was 2019.       

      Risk Assessment: Do you want to hurt yourself or someone else? Patient reports no           

      desire to harm self or others. Initial Sepsis Screen: Does the patient meet any 2           

      criteria? No. Patient's initial sepsis screen is negative. Does the patient have a          

      suspected source of infection? No. Patient's initial sepsis screen is negative. Care        

      prior to arrival: None.                                                                     

10:47 Method Of Arrival: Ambulatory                                                           iw  

10:47 Acuity: CODY 2                                                                           iw  

                                                                                                  

Triage Assessment:                                                                                

10:57 General: Appears in no apparent distress. uncomfortable, Behavior is calm, cooperative, hj  

      appropriate for age. Pain: Complains of pain in chest Pain radiates to jaw.                 

      Cardiovascular: Reports chest pain.                                                         

                                                                                                  

OB/GYN:                                                                                           

10:51 LMP N/A - Post-menopause                                                                iw  

                                                                                                  

Historical:                                                                                       

- Allergies:                                                                                      

10:51 Codeine;                                                                                iw  

10:51 Iodinated Contrast Media - IV Dye;                                                      iw  

10:51 Cipro;                                                                                  iw  

10:51 Haldol;                                                                                 iw  

10:51 sansert;                                                                                iw  

- Home Meds:                                                                                      

10:51 Prozac Oral [Active]; Lyrica Oral [Active];                                             iw  

- PMHx:                                                                                           

10:51 Migraines; Fibromyalgia;                                                                iw  

10:52 Myocardial infarction;                                                                  iw  

- PSHx:                                                                                           

10:51 Cholecystectomy; ; Tubal ligation; pelvis;                                     iw  

                                                                                                  

- Immunization history:: Adult Immunizations Adult Immunizations up to date.                      

- Social history:: Smoking status: Patient/guardian denies using tobacco.                         

- Ebola Screening: : Patient negative for fever greater than or equal to 101.5 degrees            

  Fahrenheit, and additional compatible Ebola Virus Disease symptoms Patient denies               

  exposure to infectious person Patient denies travel to an Ebola-affected area in the            

  21 days before illness onset No symptoms or risks identified at this time.                      

                                                                                                  

                                                                                                  

Screening:                                                                                        

10:56 Abuse screen: Denies threats or abuse. Denies injuries from another. Nutritional        hj  

      screening: No deficits noted. Tuberculosis screening: No symptoms or risk factors           

      identified. Fall Risk None identified.                                                      

                                                                                                  

Assessment:                                                                                       

10:58 General: Appears in no apparent distress. uncomfortable, Behavior is calm, cooperative, hj  

      appropriate for age. Pain: Complains of pain in chest Pain radiates to back and left        

      arm. Neuro: Level of Consciousness is awake, alert, obeys commands, Oriented to person,     

      place, time, situation, Appropriate for age. Cardiovascular: Reports chest pain.            

      Respiratory: Airway is patent Respiratory effort is even, unlabored, Respiratory            

      pattern is regular, symmetrical. GI: No signs and/or symptoms were reported involving       

      the gastrointestinal system. : No signs and/or symptoms were reported regarding the       

      genitourinary system. EENT: No signs and/or symptoms were reported regarding the EENT       

      system. Derm: No signs and/or symptoms reported regarding the dermatologic system.          

      Musculoskeletal: No signs and/or symptoms reported regarding the musculoskeletal system.    

12:24 Reassessment: Patient and/or family updated on plan of care and expected duration. Pain hj  

      level reassessed. Patient is alert, oriented x 3, equal unlabored respirations, skin        

      warm/dry/pink. awaiting results and POC;.                                                   

12:50 Reassessment: informed MD about pt's complaints of chest pain and nausea; awaiting      hj  

      order;.                                                                                     

13:30 Reassessment: medicated per order;.                                                     hj  

14:57 Reassessment: pt compl;aints of chest pain; EKG ordered; MD aware; awaiting order;.     hj  

                                                                                                  

Vital Signs:                                                                                      

10:51  / 95; Pulse 108; Resp 18; Temp 98.3; Pulse Ox 98% on R/A; Weight 104.33 kg;      iw  

      Height 5 ft. 2 in. (157.48 cm); Pain 8/10;                                                  

12:22  / 60; Pulse 78; Resp 18; Pulse Ox 100% on R/A;                                   hj  

13:30  / 68; Pulse 75; Resp 18; Pulse Ox 100% on R/A;                                   hj  

14:59  / 75; Pulse 77; Resp 18; Pulse Ox 100% on R/A;                                   hj  

10:51 Body Mass Index 42.07 (104.33 kg, 157.48 cm)                                              

                                                                                                  

ED Course:                                                                                        

10:42 Patient arrived in ED.                                                                  mr  

10:49 Triage completed.                                                                       iw  

10:51 Arm band placed on.                                                                     iw  

10:56 Yimi Bolden, RN is Primary Nurse.                                                    hj  

10:57 EKG done, by EKG tech. reviewed by Mc Woods MD.                                      [id number] 

10:58 Patient has correct armband on for positive identification. Placed in gown. Bed in low  hj  

      position. Call light in reach. Side rails up X 1. Adult w/ patient. Cardiac monitor on.     

      Pulse ox on. NIBP on.                                                                       

10:58 Patient maintains SpO2 saturation greater than 95% on room air.                         hj  

11:08 Blaine Haro MD is Attending Physician.                                              gs  

11:12 Initial lab(s) drawn, by me, sent to lab. Inserted saline lock: 22 gauge in right       hj  

      antecubital area, using aseptic technique. Blood collected.                                 

11:31 XRAY Chest (1 view) In Process Unspecified.                                             EDMS

15:19 No provider procedures requiring assistance completed. IV discontinued, intact,         hj  

      bleeding controlled, No redness/swelling at site. Pressure dressing applied.                

                                                                                                  

Administered Medications:                                                                         

13:09 Drug: Potassium Effervescent Tablet 50 mEq Route: PO;                                   hj  

14:18 Follow up: Response: No adverse reaction                                                hj  

13:15 Drug: Zofran 4 mg Route: IVP; Site: right antecubital;                                  hj  

14:18 Follow up: Response: No adverse reaction; Nausea is decreased                           hj  

14:12 Drug: TORadol - Ketorolac 15 mg Route: IVP; Site: right antecubital;                    hj  

14:17 Follow up: Response: No adverse reaction; Pain is decreased                             hj  

14:12 Drug: Zofran 4 mg Route: IVP; Site: right antecubital;                                  hj  

14:17 Follow up: Response: No adverse reaction; Nausea is decreased                           hj  

                                                                                                  

                                                                                                  

Outcome:                                                                                          

15:03 Discharge ordered by MD.                                                                gs  

15:19 Discharged to home ambulatory, with family.                                             hj  

15:19 Condition: stable                                                                           

15:19 Discharge instructions given to patient, family, Instructed on discharge instructions,      

      follow up and referral plans. medication usage, Demonstrated understanding of               

      instructions, follow-up care, medications.                                                  

15:19 Patient left the ED.                                                                    hj  

                                                                                                  

Signatures:                                                                                       

Dispatcher [employer]                           EDMS                                                 

Stacia Meyer Irene, RN RN                                                      

Yimi Bolden RN                      RN   hj                                                   

Blaine Haro MD MD                                                      

Ruma Hopkins                              3                                                  

                                                                                                  

Corrections: (The following items were deleted from the chart)                                    

12:24 10:58 Pain: Pain began [facility]  

                                                                                                  

**************************************************************************************************

## 2019-08-19 NOTE — EKG
Test Date:    2019-08-19               Test Time:    10:54:41

Technician:   ALL                                     

                                                     

MEASUREMENT RESULTS:                                       

Intervals:                                           

Rate:         110                                    

OH:           112                                    

QRSD:         76                                     

QT:           422                                    

QTc:          571                                    

Axis:                                                

P:            58                                     

OH:           112                                    

QRS:          57                                     

T:            63                                     

                                                     

INTERPRETIVE STATEMENTS:                                       

                                                     

Sinus tachycardia

Nonspecific ST abnormality

Abnormal ECG

No previous ECG available for comparison



Electronically Signed On 08-19-19 12:46:16 CDT by Alfonso Whipple

## 2019-08-19 NOTE — XMS REPORT
Continuity of Care Document

 Created on:2014



Patient:ABHIJIT ARCOS

Sex:Female

:1975

External Reference #:[id number]





Demographics







 Address  [address] [place] [id number]

 

 Phone  [phone]

 

 Preferred Language  Unknown

 

 Marital Status  Never 

 

 Shinto Affiliation  Unknown

 

 Race  Unknown

 

 Ethnic Group  Unknown









Author







 Organization  [facility]









Care Team Providers







 Name  Role  Phone

 

 MD Rashad, Lilliam  Unavailable  Unavailable









Insurance Providers







 Payer name  Policy type /  Policy ID  Covered party ID  Policy Rich



   Coverage type      

 

 [facility]        



 [id number]        

 

 AETNA PPO PRIMARY [id number]        

 

 AETNA (PPO)        

 

 AETNA (PPO)        

 

 *SELF PAY*        

 

 AETNA (PPO)        

 

 SLIDING FEE SCHEDULE -        



 DISCOUNT        

 

 AETNA (PPO)        

 

 AETNA (PPO)        

 

 AETNA (PPO)        

 

 AETNA (PPO)        

 

 AETNA (PPO)        

 

 AETNA (PPO)        

 

 AETNA (PPO)        

 

 AETNA (PPO)        

 

 AETNA (PPO)        

 

 AETNA (PPO)        

 

 AETNA (PPO)        

 

 AETNA (PPO)        

 

 AETNA (PPO)        

 

 AETNA (PPO)        

 

 AETNA (PPO)        

 

 AETNA (PPO)        

 

 AETNA (PPO)        

 

 *SELF PAY*        

 

 *SELF PAY*        

 

 *SELF PAY*        

 

 SLIDING FEE SCHEDULE -        



 DISCOUNT        

 

 *SELF PAY*        

 

 SLIDING FEE SCHEDULE -        



 DISCOUNT        

 

 *SELF PAY*        

 

 SLIDING FEE SCHEDULE -        



 DISCOUNT        

 

 [place]        



 - TRADITIONAL        

 

 AETNA (PPO)        

 

 *SELF PAY*        

 

 SLIDING FEE SCHEDULE -        



 DISCOUNT        

 

 AETNA (PPO)        

 

 *SELF PAY*        

 

 SLIDING FEE SCHEDULE -        



 DISCOUNT        

 

 [place]        



 - TRADITIONAL        

 

 AETNA (PPO)        

 

 *SELF PAY*        

 

 SLIDING FEE SCHEDULE -        



 DISCOUNT        

 

 AETNA (PPO)        

 

 *SELF PAY*        

 

 SLIDING FEE SCHEDULE -        



 DISCOUNT        

 

 AETNA (PPO)        

 

 *SELF PAY*        

 

 SLIDING FEE SCHEDULE -        



 DISCOUNT        

 

 AETNA (PPO)        

 

 *SELF PAY*        

 

 SLIDING FEE SCHEDULE -        



 DISCOUNT        

 

 AETNA (PPO)        

 

 *SELF PAY*        

 

 SLIDING FEE SCHEDULE -        



 DISCOUNT        

 

 AETNA (PPO)        

 

 *SELF PAY*        

 

 SLIDING FEE SCHEDULE -        



 DISCOUNT        

 

 AETNA (PPO)        

 

 *SELF PAY*        

 

 SLIDING FEE SCHEDULE -        



 DISCOUNT        

 

 [facility]        



 [employer] [place]        



 (        

 

 AETNA (PPO)        

 

 *SELF PAY*        

 

 SLIDING FEE SCHEDULE -        



 DISCOUNT        

 

 [facility] [place]        



 (        

 

 AETNA (PPO)        

 

 *SELF PAY*        

 

 SLIDING FEE SCHEDULE -        



 DISCOUNT        

 

 AETNA (PPO)        

 

 *SELF PAY*        

 

 SLIDING FEE SCHEDULE -        



 DISCOUNT        

 

 AETNA (PPO)        

 

 *SELF PAY*        

 

 SLIDING FEE SCHEDULE -        



 DISCOUNT        

 

 AETNA (PPO)        

 

 *SELF PAY*        

 

 SLIDING FEE SCHEDULE -        



 DISCOUNT        

 

 [place]        



 - TRADITIONAL        

 

 AETNA (PPO)        

 

 *SELF PAY*        

 

 SLIDING FEE SCHEDULE -        



 DISCOUNT        

 

 [place]        



 - TRADITIONAL        

 

 AETNA (PPO)        

 

 *SELF PAY*        

 

 SLIDING FEE SCHEDULE -        



 DISCOUNT        

 

 [place]        



 - TRADITIONAL        

 

 AETNA (PPO)        

 

 *SELF PAY*        

 

 SLIDING FEE SCHEDULE -        



 DISCOUNT        

 

 AETNA (PPO)        

 

 [place]        



 - TRADITIONAL        

 

 *SELF PAY*        

 

 SLIDING FEE SCHEDULE -        



 DISCOUNT        

 

 [place]        



 - TRADITIONAL        

 

 *SELF PAY*        

 

 SLIDING FEE SCHEDULE -        



 DISCOUNT        

 

 [place]        



 - TRADITIONAL        

 

 *SELF PAY*        

 

 SLIDING FEE SCHEDULE -        



 DISCOUNT        

 

 [place]        



 - TRADITIONAL        

 

 *SELF PAY*        

 

 SLIDING FEE SCHEDULE -        



 DISCOUNT        

 

 [place]        



 - TRADITIONAL        

 

 *SELF PAY*        

 

 SLIDING FEE SCHEDULE -        



 DISCOUNT        

 

 [place]        



 - TRADITIONAL        

 

 *SELF PAY*        

 

 [place]        



 - TRADITIONAL        

 

 *SELF PAY*        

 

 SLIDING FEE SCHEDULE -        



 DISCOUNT        

 

 [place]        



 - TRADITIONAL        

 

 *SELF PAY*        

 

 SLIDING FEE SCHEDULE -        



 DISCOUNT        

 

 [place]        



 - TRADITIONAL        

 

 *SELF PAY*        

 

 SLIDING FEE SCHEDULE -        



 DISCOUNT        

 

 [place]        



 - TRADITIONAL        

 

 *SELF PAY*        

 

 SLIDING FEE SCHEDULE -        



 DISCOUNT        







Encounters







 Encounter  Performer  Location  Date

 

 Office Visit  Lilliam Solorzano MD  [facility]  2014



     Practice  







Allergies, Adverse Reactions, Alerts







 Type  Substance  Reaction  Status

 

 Drug allergy  CODEINE    Active

 

 Drug allergy  IODINE    Active

 

 Drug allergy  CIPRO    Active

 

 Food allergy  SANSERT    Active

 

 Drug allergy  MYCIN    Active

 

 Drug allergy  IMITREX    Active







Problems







 Problem  Effective Dates  Problem Status

 

 SPRAIN FOOT  2013  Active

 

 FRACTURE, METATARSUS  2013  Active

 

 SPRAIN, ANKLE  2013  Active

 

 NONCOMPLIANCE W/MED TX PRS HAZARDS HLTH PERS HX  Mar 20, 2013  Active

 

 DEPRESSION    Active

 

 PREGNANCY EXAMINATION OR TEST POSITIVE RESULT  2013  Active

 

 ADVANCED MATERNAL AGE  2013  Active

 

 MIGRAINE HEADACHE  Aug 01, 2013  Active

 

 POSTPARTUM EXAMINATION  2014  Active







Procedures







 Date  Description  Comments

 

 2013  smoking status  never smoker

 

 2003  vaginal Pap smear results  Done

 

 2013  smoking status  negative

 

 Aug 01, 2013  smoking status  unknown if ever smoked

 

 Aug 27, 2013  smoking status  never smoker

 

 2014  smoking status  Never smoker

 

 May 29, 2014  smoking status  unknown if ever smoked

 

 2014  smoking status  Unknown if ever smoked







Medications







 Medication  Instructions  Start Date  Status

 

 LORTAB 5-500 MG TABS  1 PO tid PRN HEADACHE  Aug 01, 2013  Inactive

 

 ZITHROMAX 250 MG TABS  Take 2 tablets (500mg) by oral  Dec 02, 2013  Inactive



   route once daily for 1 day then    



   1 tablet (250mg)by oral route    



   once daily for 4 days.    

 

 AUGMENTIN 500-125 MG TABS  1 PO q 12 hrs  Dec 02, 2013  Inactive

 

 ZOFRAN 8 MG TABS  1 tab PO q 8hrs PRN nausea  2013  Inactive

 

 LORTAB 7.5-500 MG TABS  1 po q 6 hours prn severe  Aug 27, 2013  Inactive



   headache    

 

 CHIVO CONTOUR TEST STRP  Test 4 times per day.  2013  Inactive

 

 GLYBURIDE 2.5 MG TABS  1 tab PO q am  2013  Inactive

 

 CYCLOBENZAPRINE HCL 10 MG TABS  1 po TID prn  May 29, 2014  Inactive

 

 PROMETHAZINE HCL 25 MG TABS  1 PO Q 6 HOURS AS NEEDED  May 29, 2014  Inactive

 

 BUPAP  MG TABS  1 OR 2 PO Q 4 HOURS AS NEEDED;  May 29, 2014  Inactive



   NOT TO EXCEED 6 TABS PER DAY    

 

 CITRANATAL ASSURE 300 MG MISC  1 of each q day  2014  Inactive

 

 CAMBIA 50 MG PACK  DISSOLVE ONE PACKET IN 1 OUNCE  2014  Active



   OF WATER AND DRINK IMMEDIATELY    



   AT ONSET OF HEADACHE    

 

 CYCLOBENZAPRINE HCL 10 MG TABS  1 PO TID PRN  2014  Active

 

 PROMETHAZINE HCL 25 MG TABS  1 PO Q 4 HOURS AS NEEDED FOR  2014  Active



   NAUSEA/VOMITING    







Immunizations







 Vaccine  Date  Status

 

 hepatitis B vaccine #1  May 10, 1994  completed

 

 hepatitis B vaccine #2  1994  completed

 

 hepatitis B vaccine #3  1994  completed

 

 DPT immunization #1  Mar 07, 1974  completed

 

 DPT immunization #2  May 02, 1974  completed

 

 DPT immunization #3  1974  completed

 

 DPT immunization #4  Aug 27, 1975  completed

 

 DPT immunization #5  Oct 01, 1979  completed

 

 oral polio vaccine (OPV) #1  Mar 07, 1974  completed

 

 oral polio vaccine (OPV) #2  May 02, 1974  completed

 

 oral polio vaccine (OPV) #3  1974  completed

 

 oral polio vaccine (OPV) #4  1975  completed

 

 oral polio vaccine (OPV) #5  Oct 01, 1979  completed

 

 MMR virus immunization #1  1975  completed

 

 MMR virus immunization #2  Aug 30, 1995  completed

 

 dT (Diphtheria and Tetanus) booster  May 10, 1994  completed

 

 hepatitis B vaccine #1  2013  completed







Vital Signs







 Date  Description  Test  Result

 

 2013  height E&M  HEIGHT  62 in

 

 2013  weight E&M  WEIGHT  252 lb

 

 2013  temperature E&M  TEMPERATURE  98.3 deg f

 

 2013  blood pressure, systolic  BP SYSTOLIC  120 mm Hg

 

 2013  blood pressure, diastolic  BP DIASTOLIC  78 mm Hg

 

 2013  weight E&M  WEIGHT  252 lb

 

 2013  temperature E&M  TEMPERATURE  98.8 deg f

 

 2013  blood pressure, systolic  BP SYSTOLIC  120 mm Hg

 

 2013  blood pressure, diastolic  BP DIASTOLIC  68 mm Hg

 

 2013  weight E&M  WEIGHT  252 lb

 

 2013  temperature E&M  TEMPERATURE  98.6 deg f

 

 2013  blood pressure, systolic  BP SYSTOLIC  120 mm Hg

 

 2013  blood pressure, diastolic  BP DIASTOLIC  60 mm Hg

 

 2013  weight E&M  WEIGHT  252 lb

 

 2013  temperature E&M  TEMPERATURE  98.6 deg f

 

 2013  blood pressure, systolic  BP SYSTOLIC  120 mm Hg

 

 2013  blood pressure, diastolic  BP DIASTOLIC  60 mm Hg

 

 Mar 20, 2013  weight E&M  WEIGHT  248 lb

 

 Mar 20, 2013  temperature E&M  TEMPERATURE  98.5 deg f

 

 Mar 20, 2013  blood pressure, systolic  BP SYSTOLIC  120 mm Hg

 

 Mar 20, 2013  blood pressure, diastolic  BP DIASTOLIC  60 mm Hg

 

 2013  weight E&M  WEIGHT  243 lb

 

 2013  blood pressure, systolic  BP SYSTOLIC  122 mm Hg

 

 2013  blood pressure, diastolic  BP DIASTOLIC  70 mm Hg

 

 2013  blood pressure, systolic  BP SYSTOLIC  130 mm Hg

 

 2013  blood pressure, diastolic  BP DIASTOLIC  85 mm Hg

 

 2013  weight E&M  WEIGHT  248 lb

 

 2013  respiratory rate E&M  RESP RATE  19 /min

 

 Aug 01, 2013  weight E&M  WEIGHT  245 lb

 

 Aug 01, 2013  temperature E&M  TEMPERATURE  97.8 deg f

 

 Aug 01, 2013  blood pressure, systolic  BP SYSTOLIC  124 mm Hg

 

 Aug 01, 2013  blood pressure, diastolic  BP DIASTOLIC  74 mm Hg

 

 Aug 01, 2013  pulse rate E&M  PULSE RATE  82 /min

 

 Aug 27, 2013  weight E&M  WEIGHT  249.2 lb

 

 Aug 27, 2013  temperature E&M  TEMPERATURE  98.3 deg f

 

 Aug 27, 2013  pulse rate E&M  PULSE RATE  60 /min

 

 Aug 27, 2013  blood pressure, systolic  BP SYSTOLIC  104 mm Hg

 

 Aug 27, 2013  blood pressure, diastolic  BP DIASTOLIC  60 mm Hg

 

 2014  weight E&M  WEIGHT  240 lb

 

 2014  blood pressure, systolic, sitting, left arm  BP SYS SIT L  128 
mm Hg

 

 2014  blood pressure, diastolic, sitting, left arm  BP CHRISTY SIT L  84 
mm Hg

 

 2014  blood pressure, systolic  BP SYSTOLIC  128 mm Hg

 

 2014  blood pressure, diastolic  BP DIASTOLIC  84 mm Hg

 

 Mar 18, 2014  weight E&M  WEIGHT  255 lb

 

 Mar 18, 2014  blood pressure, systolic  BP SYSTOLIC  125 mm Hg

 

 Mar 18, 2014  blood pressure, diastolic  BP DIASTOLIC  87 mm Hg

 

 May 29, 2014  weight E&M  WEIGHT  251 lb

 

 May 29, 2014  temperature E&M  TEMPERATURE  98.5 deg f

 

 May 29, 2014  pulse rate E&M  PULSE RATE  91 /min

 

 May 29, 2014  blood pressure, systolic  BP SYSTOLIC  129 mm Hg

 

 May 29, 2014  blood pressure, diastolic  BP DIASTOLIC  85 mm Hg

 

 May 29, 2014  respiratory rate E&M  RESP RATE  10 /min

 

 2014  weight E&M  WEIGHT  255 lb

 

 2014  temperature E&M  TEMPERATURE  99.1 deg f

 

 2014  blood pressure, systolic  BP SYSTOLIC  128 mm Hg

 

 2014  blood pressure, diastolic  BP DIASTOLIC  85 mm Hg

 

 2014  pulse rate E&M  PULSE RATE  88 /min

 

 2014  respiratory rate E&M  RESP RATE  10 /min







Results







 Date  Description  Test Name  Value  Reference  Interpretation  Status

 

 2013  varicella zoster  VARICELLA AB  1.03 null    High  



   antibody, IgG,          



   serum          

 

 2003  vaginal Pap smear  PAP SMEAR  Done null      



   results

## 2019-08-19 NOTE — XMS REPORT
Continuity of Care Document

 Created on:2014



Patient:ABHIJIT ARCOS

Sex:Female

:1975

External Reference #:[id number]





Demographics







 Address  [address] [place] [address]

 

 Phone  [phone]

 

 Preferred Language  Unknown

 

 Marital Status  Never 

 

 Jehovah's witness Affiliation  Unknown

 

 Race  Unknown

 

 Ethnic Group  Unknown









Author







 Organization  [facility]









Care Team Providers







 Name  Role  Phone

 

 MD Alex, Mary  Unavailable  Unavailable









Insurance Providers







 Payer name  Policy type /  Policy ID  Covered party ID  Policy Rich



   Coverage type      

 

 [employer] [facility]        



 [id number]        

 

 AETNA PPO PRIMARY [id number]        

 

 AETNA (PPO)        

 

 AETNA (PPO)        

 

 *SELF PAY*        

 

 AETNA (PPO)        

 

 SLIDING FEE SCHEDULE -        



 DISCOUNT        

 

 AETNA (PPO)        

 

 AETNA (PPO)        

 

 AETNA (PPO)        

 

 AETNA (PPO)        

 

 AETNA (PPO)        

 

 AETNA (PPO)        

 

 AETNA (PPO)        

 

 AETNA (PPO)        

 

 AETNA (PPO)        

 

 AETNA (PPO)        

 

 AETNA (PPO)        

 

 AETNA (PPO)        

 

 AETNA (PPO)        

 

 AETNA (PPO)        

 

 AETNA (PPO)        

 

 AETNA (PPO)        

 

 AETNA (PPO)        

 

 *SELF PAY*        

 

 *SELF PAY*        

 

 *SELF PAY*        

 

 SLIDING FEE SCHEDULE -        



 DISCOUNT        

 

 *SELF PAY*        

 

 SLIDING FEE SCHEDULE -        



 DISCOUNT        

 

 *SELF PAY*        

 

 SLIDING FEE SCHEDULE -        



 DISCOUNT        

 

 [place]        



 - TRADITIONAL        

 

 AETNA (PPO)        

 

 *SELF PAY*        

 

 SLIDING FEE SCHEDULE -        



 DISCOUNT        

 

 AETNA (PPO)        

 

 *SELF PAY*        

 

 SLIDING FEE SCHEDULE -        



 DISCOUNT        

 

 [place]        



 - TRADITIONAL        

 

 AETNA (PPO)        

 

 *SELF PAY*        

 

 SLIDING FEE SCHEDULE -        



 DISCOUNT        

 

 AETNA (PPO)        

 

 *SELF PAY*        

 

 SLIDING FEE SCHEDULE -        



 DISCOUNT        

 

 AETNA (PPO)        

 

 *SELF PAY*        

 

 SLIDING FEE SCHEDULE -        



 DISCOUNT        

 

 AETNA (PPO)        

 

 *SELF PAY*        

 

 SLIDING FEE SCHEDULE -        



 DISCOUNT        

 

 AETNA (PPO)        

 

 *SELF PAY*        

 

 SLIDING FEE SCHEDULE -        



 DISCOUNT        

 

 AETNA (PPO)        

 

 *SELF PAY*        

 

 SLIDING FEE SCHEDULE -        



 DISCOUNT        

 

 AETNA (PPO)        

 

 *SELF PAY*        

 

 SLIDING FEE SCHEDULE -        



 DISCOUNT        

 

 [facility]        



 [employer] [place]        



 (        

 

 AETNA (PPO)        

 

 *SELF PAY*        

 

 SLIDING FEE SCHEDULE -        



 DISCOUNT        

 

 [facility] [place] - [place]AR        



 (        

 

 AETNA (PPO)        

 

 *SELF PAY*        

 

 SLIDING FEE SCHEDULE -        



 DISCOUNT        

 

 AETNA (PPO)        

 

 *SELF PAY*        

 

 SLIDING FEE SCHEDULE -        



 DISCOUNT        

 

 AETNA (PPO)        

 

 *SELF PAY*        

 

 SLIDING FEE SCHEDULE -        



 DISCOUNT        

 

 AETNA (PPO)        

 

 *SELF PAY*        

 

 SLIDING FEE SCHEDULE -        



 DISCOUNT        

 

 [place]        



 - TRADITIONAL        

 

 AETNA (PPO)        

 

 *SELF PAY*        

 

 SLIDING FEE SCHEDULE -        



 DISCOUNT        

 

 [place]        



 - TRADITIONAL        

 

 AETNA (PPO)        

 

 *SELF PAY*        

 

 SLIDING FEE SCHEDULE -        



 DISCOUNT        

 

 [place]        



 - TRADITIONAL        

 

 AETNA (PPO)        

 

 *SELF PAY*        

 

 SLIDING FEE SCHEDULE -        



 DISCOUNT        

 

 AETNA [place]PPO[place]        

 

 [place]        



 - TRADITIONAL        

 

 *SELF PAY*        

 

 SLIDING FEE SCHEDULE -        



 DISCOUNT        

 

 [place]        



 - TRADITIONAL        

 

 *SELF PAY*        

 

 SLIDING FEE SCHEDULE -        



 DISCOUNT        

 

 [place]        



 - TRADITIONAL        

 

 *SELF PAY*        

 

 SLIDING FEE SCHEDULE -        



 DISCOUNT        

 

 [place]        



 - TRADITIONAL        

 

 *SELF PAY*        

 

 SLIDING FEE SCHEDULE -        



 DISCOUNT        

 

 [place]        



 - TRADITIONAL        

 

 *SELF PAY*        

 

 SLIDING FEE SCHEDULE -        



 DISCOUNT        

 

 [place]        



 - TRADITIONAL        

 

 *SELF PAY*        

 

 [place]        



 - TRADITIONAL        

 

 *SELF PAY*        

 

 SLIDING FEE SCHEDULE -        



 DISCOUNT        

 

 [place]        



 - TRADITIONAL        

 

 *SELF PAY*        

 

 SLIDING FEE SCHEDULE -        



 DISCOUNT        

 

 [place]        



 - TRADITIONAL        

 

 *SELF PAY*        

 

 SLIDING FEE SCHEDULE -        



 DISCOUNT        

 

 [place]        



 - TRADITIONAL        

 

 *SELF PAY*        

 

 SLIDING FEE SCHEDULE -        



 DISCOUNT        

 

 [place]        



 - TRADITIONAL        

 

 *SELF PAY*        

 

 SLIDING FEE SCHEDULE -        



 DISCOUNT        

 

 [place]        



 - TRADITIONAL        

 

 *SELF PAY*        

 

 SLIDING FEE SCHEDULE -        



 DISCOUNT        

 

 [place]        



 - TRADITIONAL        

 

 *SELF PAY*        

 

 SLIDING FEE SCHEDULE -        



 DISCOUNT        

 

 [place]        



 - TRADITIONAL        

 

 *SELF PAY*        

 

 SLIDING FEE SCHEDULE -        



 DISCOUNT        

 

 [place]        



 - TRADITIONAL        

 

 *SELF PAY*        

 

 SLIDING FEE SCHEDULE -        



 DISCOUNT        

 

 [place]        



 - TRADITIONAL        

 

 *SELF PAY*        

 

 SLIDING FEE SCHEDULE -        



 DISCOUNT        

 

 [place]        



 - TRADITIONAL        

 

 *SELF PAY*        

 

 SLIDING FEE SCHEDULE -        



 DISCOUNT        

 

 [place]        



 - TRADITIONAL        

 

 *SELF PAY*        

 

 SLIDING FEE SCHEDULE -        



 DISCOUNT        

 

 [place]        



 - TRADITIONAL        

 

 *SELF PAY*        

 

 SLIDING FEE SCHEDULE -        



 DISCOUNT        

 

 [place]        



 - TRADITIONAL        

 

 *SELF PAY*        

 

 SLIDING FEE SCHEDULE -        



 DISCOUNT        







Encounters







 Encounter  Performer  Location  Date

 

 Office Visit  Mary Johnson MD  [facility]  2014



     Practice  







Allergies, Adverse Reactions, Alerts







 Type  Substance  Reaction  Status

 

 Drug allergy  CODEINE    Active

 

 Drug allergy  IODINE    Active

 

 Drug allergy  CIPRO    Active

 

 Food allergy  SANSERT    Active

 

 Drug allergy  MYCIN    Active

 

 Drug allergy  IMITREX    Active







Problems







 Problem  Effective Dates  Problem Status

 

 SPRAIN FOOT  2013  Inactive

 

 FRACTURE, METATARSUS  2013  Inactive

 

 SPRAIN, ANKLE  2013  Inactive

 

 NONCOMPLIANCE W/MED TX PRS HAZARDS HLTH PERS HX  Mar 20, 2013  Active

 

 DEPRESSION    Active

 

 PREGNANCY EXAMINATION OR TEST POSITIVE RESULT  2013  Inactive

 

 ADVANCED MATERNAL AGE  2013  Active

 

 MIGRAINE HEADACHE  Aug 01, 2013  Active

 

 POSTPARTUM EXAMINATION  2014  Inactive

 

 UNSPECIFIED DISORDER OF REFRACTION&ACCOMMODATION  Aug 25, 2014  Active

 

 FITTING&ADJUSTMENT OF SPECTACLES&CONTACT LENSES  Aug 25, 2014  Active

 

 MIGRAINE  Aug 25, 2014  Active

 

 ALLERGIC RHINITIS  2014  Active







Procedures







 Date  Description  Comments

 

 2013  smoking status  never smoker

 

 2003  vaginal Pap smear results  Done

 

 2013  smoking status  negative

 

 Aug 01, 2013  smoking status  unknown if ever smoked

 

 Aug 27, 2013  smoking status  never smoker

 

 2014  smoking status  Never smoker

 

 May 29, 2014  smoking status  unknown if ever smoked

 

 2014  smoking status  Unknown if ever smoked

 

 2014  smoking status  Unknown if ever smoked







Medications







 Medication  Instructions  Start Date  Status

 

 LORTAB 5-500 MG TABS  1 PO tid PRN HEADACHE  Aug 01, 2013  Inactive

 

 ZITHROMAX 250 MG TABS  Take 2 tablets (500mg) by oral  Dec 02, 2013  Inactive



   route once daily for 1 day then    



   1 tablet (250mg)by oral route    



   once daily for 4 days.    

 

 AUGMENTIN 500-125 MG TABS  1 PO q 12 hrs  Dec 02, 2013  Inactive

 

 ZOFRAN 8 MG TABS  1 tab PO q 8hrs PRN nausea  2013  Inactive

 

 LORTAB 7.5-500 MG TABS  1 po q 6 hours prn severe  Aug 27, 2013  Inactive



   headache    

 

 CHIVO CONTOUR TEST STRP  Test 4 times per day.  2013  Inactive

 

 GLYBURIDE 2.5 MG TABS  1 tab PO q am  2013  Inactive

 

 CYCLOBENZAPRINE HCL 10 MG TABS  1 po TID prn  May 29, 2014  Inactive

 

 PROMETHAZINE HCL 25 MG TABS  1 PO Q 6 HOURS AS NEEDED  May 29, 2014  Inactive

 

 BUPAP  MG TABS  1 OR 2 PO Q 4 HOURS AS NEEDED;  May 29, 2014  Inactive



   NOT TO EXCEED 6 TABS PER DAY    

 

 CITRANATAL ASSURE 300 MG MISC  1 of each q day  2014  Inactive

 

 PROMETHAZINE HCL 25 MG TABS  1 PO Q 4 HOURS AS NEEDED FOR  2014  
Inactive



   NAUSEA/VOMITING    

 

 CYCLOBENZAPRINE HCL 10 MG TABS  1 PO TID PRN  2014  Inactive

 

 CAMBIA 50 MG PACK  DISSOLVE ONE PACKET IN 1 OUNCE  2014  Inactive



   OF WATER AND DRINK IMMEDIATELY    



   AT ONSET OF HEADACHE    

 

 PROZAC 40 MG CAPS  take one daily  2014  Active

 

 WELLBUTRIN  MG XR24H-TAB  take one daily  2014  Active







Immunizations







 Vaccine  Date  Status

 

 hepatitis B vaccine #1 given  May 10, 1994  completed

 

 hepatitis B vaccine #2 given  1994  completed

 

 hepatitis B vaccine #3  1994  completed

 

 DPT immunization #1  Mar 07, 1974  completed

 

 DPT immunization #2  May 02, 1974  completed

 

 DPT immunization #3  1974  completed

 

 DPT immunization #4  Aug 27, 1975  completed

 

 DPT immunization #5  Oct 01, 1979  completed

 

 oral polio vaccine (OPV) #1  Mar 07, 1974  completed

 

 oral polio vaccine (OPV) #2  May 02, 1974  completed

 

 oral polio vaccine (OPV) #3  1974  completed

 

 oral polio vaccine (OPV) #4  1975  completed

 

 oral polio vaccine (OPV) #5  Oct 01, 1979  completed

 

 MMR (measles, mumps, rubella) virus immunization #1  1975  completed

 

 MMR (measles, mumps, rubella) virus immunization #2  Aug 30, 1995  completed

 

 dT (Diphtheria and Tetanus) booster given  May 10, 1994  completed

 

 hepatitis B vaccine #1 given  2013  completed







Vital Signs







 Date  Description  Test  Result

 

 2013  height E&M - 8302-2  HEIGHT  62 in

 

 2013  weight E&M - 3141-9  WEIGHT  252 lb

 

 2013  temperature E&M  TEMPERATURE  98.3 deg f

 

 2013  blood pressure, systolic - 8480-6  BP SYSTOLIC  120 mm Hg

 

 2013  blood pressure, diastolic - 8462-4  BP DIASTOLIC  78 mm Hg

 

 2013  weight E&M - 3141-9  WEIGHT  252 lb

 

 2013  temperature E&M  TEMPERATURE  98.8 deg f

 

 2013  blood pressure, systolic - 8480-6  BP SYSTOLIC  120 mm Hg

 

 2013  blood pressure, diastolic - 8462-4  BP DIASTOLIC  68 mm Hg

 

 2013  weight E&M - 3141-9  WEIGHT  252 lb

 

 2013  temperature E&M  TEMPERATURE  98.6 deg f

 

 2013  blood pressure, systolic - 8480-6  BP SYSTOLIC  120 mm Hg

 

 2013  blood pressure, diastolic - 8462-4  BP DIASTOLIC  60 mm Hg

 

 2013  weight E&M - 3141-9  WEIGHT  252 lb

 

 2013  temperature E&M  TEMPERATURE  98.6 deg f

 

 2013  blood pressure, systolic - 8480-6  BP SYSTOLIC  120 mm Hg

 

 2013  blood pressure, diastolic - 8462-4  BP DIASTOLIC  60 mm Hg

 

 Mar 20, 2013  weight E&M - 3141-9  WEIGHT  248 lb

 

 Mar 20, 2013  temperature E&M  TEMPERATURE  98.5 deg f

 

 Mar 20, 2013  blood pressure, systolic - 8480-6  BP SYSTOLIC  120 mm Hg

 

 Mar 20, 2013  blood pressure, diastolic - 8462-4  BP DIASTOLIC  60 mm Hg

 

 2013  weight E&M - 3141-9  WEIGHT  243 lb

 

 2013  blood pressure, systolic - 8480-6  BP SYSTOLIC  122 mm Hg

 

 2013  blood pressure, diastolic - 8462-4  BP DIASTOLIC  70 mm Hg

 

 2013  blood pressure, systolic - 8480-6  BP SYSTOLIC  130 mm Hg

 

 2013  blood pressure, diastolic - 8462-4  BP DIASTOLIC  85 mm Hg

 

 2013  weight E&M - 3141-9  WEIGHT  248 lb

 

 2013  respiratory rate E&M - 9279-1  RESP RATE  19 /min

 

 Aug 01, 2013  weight E&M - 3141-9  WEIGHT  245 lb

 

 Aug 01, 2013  temperature E&M  TEMPERATURE  97.8 deg f

 

 Aug 01, 2013  blood pressure, systolic - 8480-6  BP SYSTOLIC  124 mm Hg

 

 Aug 01, 2013  blood pressure, diastolic - 8462-4  BP DIASTOLIC  74 mm Hg

 

 Aug 01, 2013  pulse rate E&M - 8867-4  PULSE RATE  82 /min

 

 Aug 27, 2013  weight MICHELLE&M - 3141-9  WEIGHT  249.2 lb

 

 Aug 27, 2013  temperature E&M  TEMPERATURE  98.3 deg f

 

 Aug 27, 2013  pulse rate E&M - 8867-4  PULSE RATE  60 /min

 

 Aug 27, 2013  blood pressure, systolic - 8480-6  BP SYSTOLIC  104 mm Hg

 

 Aug 27, 2013  blood pressure, diastolic - 8462-4  BP DIASTOLIC  60 mm Hg

 

 2014  weight MICHELLE&M - 3141-9  WEIGHT  240 lb

 

 2014  blood pressure, systolic, sitting, left arm  BP SYS SIT L  128 
mm Hg

 

 2014  blood pressure, diastolic, sitting, left arm  BP CHRISTY SIT L  84 
mm Hg

 

 2014  blood pressure, systolic - 8480-6  BP SYSTOLIC  128 mm Hg

 

 2014  blood pressure, diastolic - 8462-4  BP DIASTOLIC  84 mm Hg

 

 Mar 18, 2014  weight MICHELLE&YINKA - [place]1-9  WEIGHT  255 lb

 

 Mar 18, 2014  blood pressure, systolic - 8480-6  BP SYSTOLIC  125 mm Hg

 

 Mar 18, 2014  blood pressure, diastolic - 8462-4  BP DIASTOLIC  87 mm Hg

 

 May 29, 2014  weight MICHELLE&YINKA - [place]1-9  WEIGHT  251 lb

 

 May 29, 2014  temperature E&M  TEMPERATURE  98.5 deg f

 

 May 29, 2014  pulse rate E&M - 8867-4  PULSE RATE  91 /min

 

 May 29, 2014  blood pressure, systolic - 8480-6  BP SYSTOLIC  129 mm Hg

 

 May 29, 2014  blood pressure, diastolic - 8462-4  BP DIASTOLIC  85 mm Hg

 

 May 29, 2014  respiratory rate E&M - 9279-1  RESP RATE  10 /min

 

 2014  weight MICHELLE&YINKA - Del[place]1-9  WEIGHT  255 lb

 

 2014  temperature E&M  TEMPERATURE  99.1 deg f

 

 2014  blood pressure, systolic - 8480-6  BP SYSTOLIC  128 mm Hg

 

 2014  blood pressure, diastolic - 8462-4  BP DIASTOLIC  85 mm Hg

 

 2014  pulse rate E&M - 8867-4  PULSE RATE  88 /min

 

 2014  respiratory rate E&M - 9279-1  RESP RATE  10 /min

 

 2014  temperature E&M  TEMPERATURE  98.4 deg f

 

 2014  blood pressure, systolic - 8480-6  BP SYSTOLIC  131 mm Hg

 

 2014  blood pressure, diastolic - 8462-4  BP DIASTOLIC  80 mm Hg

 

 2014  pulse rate E&M - 8867-4  PULSE RATE  80 /min

 

 2014  height E&M - 8302-2  HEIGHT  62 in

 

 2014  respiratory rate E&M - 9279-1  RESP RATE  12 /min







Results







 Date  Description  Test Name  Value  Reference  Interpretation  Status

 

 2013  varicella zoster  VARICELLA AB  1.03 null    High  



   antibody, IgG,          



   serum          

 

 2003  vaginal Pap smear  PAP SMEAR  Done null      



   results

## 2019-08-19 NOTE — EDPHYS
Physician Documentation                                                                           

 [facility]                                                                 

Name: Karla Oliva                                                                                  

Age: 43 yrs                                                                                       

Sex: Female                                                                                       

: 1975                                                                                   

MRN: [id number]                                                                                   

Arrival Date: 2019                                                                          

Time: 10:42                                                                                       

Account#: [id number]                                                                            

Bed 20                                                                                            

Private MD:                                                                                       

ED Physician Blaine Haro                                                                       

HPI:                                                                                              

                                                                                             

14:55 This 43 yrs old  Female presents to ER via Ambulatory with complaints of Chest gs  

      Pain.                                                                                       

14:55 The patient or guardian reports chest pain that is located primarily in the anterior    gs  

      chest wall. Onset: this morning. The pain radiates to Associated signs and symptoms:        

      Pertinent negatives: abdominal pain, cough, diaphoresis, dizziness, lightheadedness,        

      near syncope, shortness of breath, vomiting. The chest pain is described as a               

      heaviness. Duration: The patient or guardian reports multiple episodes, that are            

      intermittent, that wax and wane, with no pattern. Modifying factors: The symptoms are       

      alleviated by nothing. the symptoms are aggravated by nothing. Severity of pain: At its     

      worst the pain was moderate in the emergency department the pain has improved               

      moderately. The patient has experienced similar episodes in the past, a few times.          

                                                                                                  

OB/GYN:                                                                                           

10:51 LMP N/A - Post-menopause                                                                iw  

                                                                                                  

Historical:                                                                                       

- Allergies:                                                                                      

10:51 Codeine;                                                                                iw  

10:51 Iodinated Contrast Media - IV Dye;                                                      iw  

10:51 Cipro;                                                                                  iw  

10:51 Haldol;                                                                                 iw  

10:51 sansert;                                                                                iw  

- Home Meds:                                                                                      

10:51 Prozac Oral [Active]; Lyrica Oral [Active];                                             iw  

- PMHx:                                                                                           

10:51 Migraines; Fibromyalgia;                                                                iw  

10:52 Myocardial infarction;                                                                  iw  

- PSHx:                                                                                           

10:51 Cholecystectomy; ; Tubal ligation; pelvis;                                     iw  

                                                                                                  

- Immunization history:: Adult Immunizations Adult Immunizations up to date.                      

- Social history:: Smoking status: Patient/guardian denies using tobacco.                         

- Ebola Screening: : Patient negative for fever greater than or equal to 101.5 degrees            

  Fahrenheit, and additional compatible Ebola Virus Disease symptoms Patient denies               

  exposure to infectious person Patient denies travel to an Ebola-affected area in the            

  21 days before illness onset No symptoms or risks identified at this time.                      

                                                                                                  

                                                                                                  

ROS:                                                                                              

14:55 All other systems are negative.                                                         gs  

                                                                                                  

Exam:                                                                                             

14:55 Head/Face:  Normocephalic, atraumatic. Eyes:  Pupils equal round and reactive to light, gs  

      extra-ocular motions intact.  Lids and lashes normal.  Conjunctiva and sclera are           

      non-icteric and not injected.  Cornea within normal limits.  Periorbital areas with no      

      swelling, redness, or edema. ENT:  Nares patent. No nasal discharge, no septal              

      abnormalities noted.  Tympanic membranes are normal and external auditory canals are        

      clear.  Oropharynx with no redness, swelling, or masses, exudates, or evidence of           

      obstruction, uvula midline.  Mucous membranes moist. Neck:  Trachea midline, no             

      thyromegaly or masses palpated, and no cervical lymphadenopathy.  Supple, full range of     

      motion without nuchal rigidity, or vertebral point tenderness.  No Meningismus.             

      Chest/axilla:  Normal chest wall appearance and motion.  Nontender with no deformity.       

      No lesions are appreciated. Cardiovascular:  Regular rate and rhythm with a normal S1       

      and S2.  No gallops, murmurs, or rubs.  Normal PMI, no JVD.  No pulse deficits.             

      Respiratory:  Lungs have equal breath sounds bilaterally, clear to auscultation and         

      percussion.  No rales, rhonchi or wheezes noted.  No increased work of breathing, no        

      retractions or nasal flaring. Abdomen/GI:  Soft, non-tender, with normal bowel sounds.      

      No distension or tympany.  No guarding or rebound.  No evidence of tenderness               

      throughout. Back:  No spinal tenderness.  No costovertebral tenderness.  Full range of      

      motion. Skin:  Warm, dry with normal turgor.  Normal color with no rashes, no lesions,      

      and no evidence of cellulitis. MS/ Extremity:  Pulses equal, no cyanosis.                   

      Neurovascular intact.  Full, normal range of motion. Neuro:  Awake and alert, GCS 15,       

      oriented to person, place, time, and situation.  Cranial nerves II-XII grossly intact.      

      Motor strength 5/5 in all extremities.  Sensory grossly intact.  Cerebellar exam            

      normal.  Normal gait.                                                                       

14:55 Constitutional: The patient appears alert, awake.                                           

14:55 ECG was reviewed by the Attending Physician.                                                

                                                                                                  

Vital Signs:                                                                                      

10:51  / 95; Pulse 108; Resp 18; Temp 98.3; Pulse Ox 98% on R/A; Weight 104.33 kg;      iw  

      Height 5 ft. 2 in. (157.48 cm); Pain 8/10;                                                  

12:22  / 60; Pulse 78; Resp 18; Pulse Ox 100% on R/A;                                   hj  

13:30  / 68; Pulse 75; Resp 18; Pulse Ox 100% on R/A;                                   hj  

14:59  / 75; Pulse 77; Resp 18; Pulse Ox 100% on R/A;                                   hj  

10:51 Body Mass Index 42.07 (104.33 kg, 157.48 cm)                                            iw  

                                                                                                  

MDM:                                                                                              

11:14 Patient medically screened.                                                             gs  

14:55 Differential diagnosis: acute myocardial infarction, chest wall pain, stable angina.    gs  

      HEART Score: History: Slightly Suspicious (0), ECG: Non specific repolarization             

      disturbance / LBTB / PM (1), Age: < or = 45 years (0), Risk Factors: No Risk Factors        

      Known (0), Troponin: < or = 1 x Normal Limit (0). Data reviewed: vital signs, nurses        

      notes, lab test result(s), EKG, radiologic studies. Counseling: I had a detailed            

      discussion with the patient and/or guardian regarding: the historical points, exam          

      findings, and any diagnostic results supporting the discharge/admit diagnosis, the need     

      for outpatient follow up.                                                                   

                                                                                                  

                                                                                             

11:15 Order name: Basic Metabolic Panel; Complete Time: 13:                                   

                                                                                             

11:15 Order name: CBC with Diff; Complete Time: 11:                                           

                                                                                             

11:15 Order name: Troponin (emerg Dept Use Only); Complete Time: 13:                          

                                                                                             

11:15 Order name: XRAY Chest (1 view); Complete Time: 11:                                     

                                                                                             

13:09 Order name: Troponin (emerg Dept Use Only); Complete Time: 14:49                          

                                                                                             

11:15 Order name: EKG; Complete Time: 11:                                                     

                                                                                             

11:15 Order name: Cardiac monitoring; Complete Time: 11:                                      

                                                                                             

11:15 Order name: EKG - Nurse/Tech; Complete Time: :                                        

                                                                                             

11:15 Order name: IV Saline Lock; Complete Time: :                                          

                                                                                             

11:15 Order name: Labs collected and sent; Complete Time: :                                 

                                                                                             

11:15 Order name: O2 Per Protocol; Complete Time: :                                         

                                                                                             

11:15 Order name: O2 Sat Monitoring; Complete Time: :                                       

                                                                                             

14:57 Order name: EKG - Nurse/Tech; Complete Time: 14:57                                        

                                                                                                  

EC:55 Rate is 110 beats/min. Rhythm is regular, Sinus tachycardia. T waves are Flattened.       

      Clinical impression: NSR w/ Non-specific ST/T Changes. Interpreted by me.                   

                                                                                                  

Administered Medications:                                                                         

13:09 Drug: Potassium Effervescent Tablet 50 mEq Route: PO;                                   hj  

14:18 Follow up: Response: No adverse reaction                                                  

13:15 Drug: Zofran 4 mg Route: IVP; Site: right antecubital;                                  hj  

14:18 Follow up: Response: No adverse reaction; Nausea is decreased                           hj  

14:12 Drug: TORadol - Ketorolac 15 mg Route: IVP; Site: right antecubital;                    hj  

14:17 Follow up: Response: No adverse reaction; Pain is decreased                               

14:12 Drug: Zofran 4 mg Route: IVP; Site: right antecubital;                                  hj  

14:17 Follow up: Response: No adverse reaction; Nausea is decreased                             

                                                                                                  

                                                                                                  

Disposition:                                                                                      

19 15:03 Discharged to Home. Impression: Chest pain, unspecified.                           

- Condition is Stable.                                                                            

- Discharge Instructions: Nonspecific Chest Pain.                                                 

                                                                                                  

- Medication Reconciliation Form, Thank You Letter, Antibiotic Education, Prescription            

  Opioid Use form.                                                                                

- Follow up: Private Physician; When: 2 - 3 days; Reason: Re-evaluation by your                   

  physician.                                                                                      

                                                                                                  

                                                                                                  

                                                                                                  

Signatures:                                                                                       

Dispatcher [employer]                           Jhoana Austin RN RN                                                      

Yimi Bolden RN RN hj Starr, Gregory, MD MD                                                      

                                                                                                  

Corrections: (The following items were deleted from the chart)                                    

15:19 15:03 2019 15:03 Discharged to Home. Impression: Chest pain, unspecified.           

      Condition is Stable. Forms are Medication Reconciliation Form, Thank You Letter,            

      Antibiotic Education, Prescription Opioid Use. Follow up: Private Physician; When: 2 -      

      3 days; Reason: Re-evaluation by your physician.                                          

                                                                                                  

**************************************************************************************************

## 2019-08-19 NOTE — XMS REPORT
Continuity of Care Document

 Created on:2014



Patient:ABHIJIT ARCOS

Sex:Female

:1975

External Reference #:[id number]





Demographics







 Address  [address] [place] [id number]

 

 Phone  [phone]

 

 Preferred Language  Unknown

 

 Marital Status  Never 

 

 Synagogue Affiliation  Unknown

 

 Race  Unknown

 

 Ethnic Group  Unknown









Author







 Organization  [facility]









Care Team Providers







 Name  Role  Phone

 

 MD Rashad, Lilliam  Unavailable  Unavailable









Insurance Providers







 Payer name  Policy type /  Policy ID  Covered party ID  Policy Rich



   Coverage type      

 

 [facility]        



 [id number]        

 

 AETNA PPO PRIMARY [id number]        

 

 AETNA (PPO)        

 

 AETNA (PPO)        

 

 *SELF PAY*        

 

 AETNA (PPO)        

 

 SLIDING FEE SCHEDULE -        



 DISCOUNT        

 

 AETNA (PPO)        

 

 AETNA (PPO)        

 

 AETNA (PPO)        

 

 AETNA (PPO)        

 

 AETNA (PPO)        

 

 AETNA (PPO)        

 

 AETNA (PPO)        

 

 AETNA (PPO)        

 

 AETNA (PPO)        

 

 AETNA (PPO)        

 

 AETNA (PPO)        

 

 AETNA (PPO)        

 

 AETNA (PPO)        

 

 AETNA (PPO)        

 

 AETNA (PPO)        

 

 AETNA (PPO)        

 

 AETNA (PPO)        

 

 *SELF PAY*        

 

 *SELF PAY*        

 

 *SELF PAY*        

 

 SLIDING FEE SCHEDULE -        



 DISCOUNT        

 

 *SELF PAY*        

 

 SLIDING FEE SCHEDULE -        



 DISCOUNT        

 

 *SELF PAY*        

 

 SLIDING FEE SCHEDULE -        



 DISCOUNT        

 

 [place] ACS - TMHP        



 - TRADITIONAL        

 

 AETNA (PPO)        

 

 *SELF PAY*        

 

 SLIDING FEE SCHEDULE -        



 DISCOUNT        

 

 AETNA (PPO)        

 

 *SELF PAY*        

 

 SLIDING FEE SCHEDULE -        



 DISCOUNT        

 

 [place] ACS - TMHP        



 - TRADITIONAL        

 

 AETNA (PPO)        

 

 *SELF PAY*        

 

 SLIDING FEE SCHEDULE -        



 DISCOUNT        

 

 AETNA (PPO)        

 

 *SELF PAY*        

 

 SLIDING FEE SCHEDULE -        



 DISCOUNT        

 

 AETNA (PPO)        

 

 *SELF PAY*        

 

 SLIDING FEE SCHEDULE -        



 DISCOUNT        

 

 AETNA (PPO)        

 

 *SELF PAY*        

 

 SLIDING FEE SCHEDULE -        



 DISCOUNT        







Encounters







 Encounter  Performer  Location  Date

 

 Lab Report  Lilliam Solorzano MD  [facility]  2014







Allergies, Adverse Reactions, Alerts







 Type  Substance  Reaction  Status

 

 Drug allergy  CODEINE    Active

 

 Drug allergy  IODINE    Active

 

 Drug allergy  CIPRO    Active

 

 Food allergy  SANSERT    Active

 

 Drug allergy  MYCIN    Active

 

 Drug allergy  IMITREX    Active







Problems







 Problem  Effective Dates  Problem Status

 

 SPRAIN FOOT  2013  Active

 

 FRACTURE, METATARSUS  2013  Active

 

 SPRAIN, ANKLE  2013  Active

 

 NONCOMPLIANCE W/MED TX PRS HAZARDS HLTH PERS HX  Mar 20, 2013  Active

 

 DEPRESSION    Active

 

 PREGNANCY EXAMINATION OR TEST POSITIVE RESULT  2013  Active

 

 ADVANCED MATERNAL AGE  2013  Active

 

 MIGRAINE HEADACHE  Aug 01, 2013  Active

 

 POSTPARTUM EXAMINATION  2014  Active







Procedures







 Date  Description  Comments

 

 2013  smoking status  never smoker

 

 2003  vaginal Pap smear results  Done

 

 2013  smoking status  negative

 

 Aug 01, 2013  smoking status  unknown if ever smoked

 

 Aug 27, 2013  smoking status  never smoker

 

 2014  smoking status  never smoked







Medications







 Medication  Instructions  Start Date  Status

 

 LORTAB 5-500 MG TABS  1 PO tid PRN HEADACHE  Aug 01, 2013  Inactive

 

 ZITHROMAX 250 MG TABS  Take 2 tablets (500mg) by oral route  Dec 02, 2013  
Inactive



   once daily for 1 day then 1 tablet    



   (250mg)by oral route once daily for 4    



   days.    

 

 AUGMENTIN 500-125 MG TABS  1 PO q 12 hrs  Dec 02, 2013  Inactive

 

 ZOFRAN 8 MG TABS  1 tab PO q 8hrs PRN nausea  2013  Inactive

 

 LORTAB 7.5-500 MG TABS  1 po q 6 hours prn severe headache  Aug 27, 2013  
Inactive

 

 CHIVO CONTOUR TEST STRP  Test 4 times per day.  2013  Inactive

 

 GLYBURIDE 2.5 MG TABS  1 tab PO q am  2013  Inactive







Immunizations







 Vaccine  Date  Status

 

 hepatitis B vaccine #1 given  May 10, 1994  completed

 

 hepatitis B vaccine #2 given  1994  completed

 

 hepatitis B vaccine #3  1994  completed

 

 DPT immunization #1  Mar 07, 1974  completed

 

 DPT immunization #2  May 02, 1974  completed

 

 DPT immunization #3  1974  completed

 

 DPT immunization #4  Aug 27, 1975  completed

 

 DPT immunization #5  Oct 01, 1979  completed

 

 oral polio vaccine (OPV) #1  Mar 07, 1974  completed

 

 oral polio vaccine (OPV) #2  May 02, 1974  completed

 

 oral polio vaccine (OPV) #3  1974  completed

 

 oral polio vaccine (OPV) #4  1975  completed

 

 oral polio vaccine (OPV) #5  Oct 01, 1979  completed

 

 MMR (measles, mumps, rubella) virus immunization #1  1975  completed

 

 MMR (measles, mumps, rubella) virus immunization #2  Aug 30, 1995  completed

 

 dT (Diphtheria and Tetanus) booster given  May 10, 1994  completed

 

 hepatitis B vaccine #1 given  2013  completed







Vital Signs







 Date  Description  Test  Result

 

 2013  height E&M - 8302-2  HEIGHT  62 in

 

 2013  weight E&M - 3141-9  WEIGHT  252 lb

 

 2013  temperature E&M  TEMPERATURE  98.3 deg f

 

 2013  blood pressure, systolic - 8480-6  BP SYSTOLIC  120 mm Hg

 

 2013  blood pressure, diastolic - 8462-4  BP DIASTOLIC  78 mm Hg

 

 2013  weight E&M - 3141-9  WEIGHT  252 lb

 

 2013  temperature E&M  TEMPERATURE  98.8 deg f

 

 2013  blood pressure, systolic - 8480-6  BP SYSTOLIC  120 mm Hg

 

 2013  blood pressure, diastolic - 8462-4  BP DIASTOLIC  68 mm Hg

 

 2013  weight E&M - 3141-9  WEIGHT  252 lb

 

 2013  temperature E&M  TEMPERATURE  98.6 deg f

 

 2013  blood pressure, systolic - 8480-6  BP SYSTOLIC  120 mm Hg

 

 2013  blood pressure, diastolic - 8462-4  BP DIASTOLIC  60 mm Hg

 

 2013  weight E&M - 3141-9  WEIGHT  252 lb

 

 2013  temperature E&M  TEMPERATURE  98.6 deg f

 

 2013  blood pressure, systolic - 8480-6  BP SYSTOLIC  120 mm Hg

 

 2013  blood pressure, diastolic - 8462-4  BP DIASTOLIC  60 mm Hg

 

 Mar 20, 2013  weight E&M - 3141-9  WEIGHT  248 lb

 

 Mar 20, 2013  temperature E&M  TEMPERATURE  98.5 deg f

 

 Mar 20, 2013  blood pressure, systolic - 8480-6  BP SYSTOLIC  120 mm Hg

 

 Mar 20, 2013  blood pressure, diastolic - 8462-4  BP DIASTOLIC  60 mm Hg

 

 2013  weight E&M - 3141-9  WEIGHT  243 lb

 

 2013  blood pressure, systolic - 8480-6  BP SYSTOLIC  122 mm Hg

 

 2013  blood pressure, diastolic - 8462-4  BP DIASTOLIC  70 mm Hg

 

 2013  blood pressure, systolic - 8480-6  BP SYSTOLIC  130 mm Hg

 

 2013  blood pressure, diastolic - 8462-4  BP DIASTOLIC  85 mm Hg

 

 2013  weight E&M - 3141-9  WEIGHT  248 lb

 

 2013  respiratory rate E&M - 9279-1  RESP RATE  19 /min

 

 Aug 01, 2013  weight E&M - 3141-9  WEIGHT  245 lb

 

 Aug 01, 2013  temperature E&M  TEMPERATURE  97.8 deg f

 

 Aug 01, 2013  blood pressure, systolic - 8480-6  BP SYSTOLIC  124 mm Hg

 

 Aug 01, 2013  blood pressure, diastolic - 8462-4  BP DIASTOLIC  74 mm Hg

 

 Aug 01, 2013  pulse rate E&M - 8867-4  PULSE RATE  82 /min

 

 Aug 27, 2013  weight E&M - 3141-9  WEIGHT  249.2 lb

 

 Aug 27, 2013  temperature E&M  TEMPERATURE  98.3 deg f

 

 Aug 27, 2013  pulse rate E&M - 8867-4  PULSE RATE  60 /min

 

 Aug 27, 2013  blood pressure, systolic - 8480-6  BP SYSTOLIC  104 mm Hg

 

 Aug 27, 2013  blood pressure, diastolic - 8462-4  BP DIASTOLIC  60 mm Hg

 

 2014  weight MICHELLE&M - 3141-9  WEIGHT  240 lb

 

 2014  blood pressure, systolic, sitting, left arm  BP SYS SIT L  128 
mm Hg

 

 2014  blood pressure, diastolic, sitting, left arm  BP CHRISTY SIT L  84 
mm Hg

 

 2014  blood pressure, systolic - 8480-6  BP SYSTOLIC  128 mm Hg

 

 2014  blood pressure, diastolic - 8462-4  BP DIASTOLIC  84 mm Hg







Results







 Date  Description  Test Name  Value  Reference  Interpretation  Status

 

 2013  varicella zoster  VARICELLA AB  1.03 null    High  



   antibody, IgG,          



   serum          

 

 2003  vaginal Pap smear  PAP SMEAR  Done null      



   results

## 2019-08-19 NOTE — XMS REPORT
Continuity of Care Document

 Created on:Cherri 3, 2015



Patient:ABHIJIT ARCOS

Sex:Female

:1975

External Reference #:[id number]





Demographics







 Address  [address] [place] [id number]

 

 Phone  [phone]

 

 Preferred Language  Unknown

 

 Marital Status  Never 

 

 Restoration Affiliation  Unknown

 

 Race  Unknown

 

 Ethnic Group  Unknown









Author







 Organization  [facility]









Care Team Providers







 Name  Role  Phone

 

 GIORGIO Harmon, Karan  Unavailable  Unavailable









Insurance Providers







 Payer name  Policy type /  Policy ID  Covered party ID  Policy Rich



   Coverage type      

 

 [facility]        



 [id number]        

 

 AETNA PPO PRIMARY [id number]        

 

 AETNA (PPO)        

 

 AETNA (PPO)        

 

 *SELF PAY*        

 

 AETNA (PPO)        

 

 SLIDING FEE SCHEDULE -        



 DISCOUNT        

 

 AETNA (PPO)        

 

 AETNA (PPO)        

 

 AETNA (PPO)        

 

 AETNA (PPO)        

 

 AETNA (PPO)        

 

 AETNA (PPO)        

 

 AETNA (PPO)        

 

 AETNA (PPO)        

 

 AETNA (PPO)        

 

 AETNA (PPO)        

 

 AETNA (PPO)        

 

 AETNA (PPO)        

 

 AETNA (PPO)        

 

 AETNA (PPO)        

 

 AETNA (PPO)        

 

 AETNA (PPO)        

 

 AETNA (PPO)        

 

 *SELF PAY*        

 

 *SELF PAY*        

 

 *SELF PAY*        

 

 SLIDING FEE SCHEDULE -        



 DISCOUNT        

 

 *SELF PAY*        

 

 SLIDING FEE SCHEDULE -        



 DISCOUNT        

 

 *SELF PAY*        

 

 SLIDING FEE SCHEDULE -        



 DISCOUNT        

 

 [place] ACS [place] TM[place]        



 - TRADITIONAL        

 

 AETNA (PPO)        

 

 *SELF PAY*        

 

 SLIDING FEE SCHEDULE -        



 DISCOUNT        

 

 AETNA (PPO)        

 

 *SELF PAY*        

 

 SLIDING FEE SCHEDULE -        



 DISCOUNT        

 

 [place] ACS [place] TM[place]        



 - TRADITIONAL        

 

 AETNA (PPO)        

 

 *SELF PAY*        

 

 SLIDING FEE SCHEDULE -        



 DISCOUNT        

 

 AETNA (PPO)        

 

 *SELF PAY*        

 

 SLIDING FEE SCHEDULE -        



 DISCOUNT        

 

 AETNA (PPO)        

 

 *SELF PAY*        

 

 SLIDING FEE SCHEDULE -        



 DISCOUNT        

 

 AETNA (PPO)        

 

 *SELF PAY*        

 

 SLIDING FEE SCHEDULE -        



 DISCOUNT        

 

 AETNA (PPO)        

 

 *SELF PAY*        

 

 SLIDING FEE SCHEDULE -        



 DISCOUNT        

 

 AETNA (PPO)        

 

 *SELF PAY*        

 

 SLIDING FEE SCHEDULE -        



 DISCOUNT        

 

 AETNA (PPO)        

 

 *SELF PAY*        

 

 SLIDING FEE SCHEDULE -        



 DISCOUNT        

 

 [facility]        



 [employer] [place] [place]        



 (        

 

 AETNA (PPO)        

 

 *SELF PAY*        

 

 SLIDING FEE SCHEDULE -        



 DISCOUNT        

 

 [facility] [place]        



 (        

 

 AETNA (PPO)        

 

 *SELF PAY*        

 

 SLIDING FEE SCHEDULE -        



 DISCOUNT        

 

 AETNA (PPO)        

 

 *SELF PAY*        

 

 SLIDING FEE SCHEDULE -        



 DISCOUNT        

 

 AETNA (PPO)        

 

 *SELF PAY*        

 

 SLIDING FEE SCHEDULE -        



 DISCOUNT        

 

 AETNA (PPO)        

 

 *SELF PAY*        

 

 SLIDING FEE SCHEDULE -        



 DISCOUNT        

 

 [place] ACS - TMHP        



 - TRADITIONAL        

 

 AETNA (PPO)        

 

 *SELF PAY*        

 

 SLIDING FEE SCHEDULE -        



 DISCOUNT        

 

 [place]        



 - TRADITIONAL        

 

 AETNA (PPO)        

 

 *SELF PAY*        

 

 SLIDING FEE SCHEDULE -        



 DISCOUNT        

 

 [place]        



 - TRADITIONAL        

 

 AETNA (PPO)        

 

 *SELF PAY*        

 

 SLIDING FEE SCHEDULE -        



 DISCOUNT        

 

 AETNA (PPO)        

 

 [place] ACS [place] TM[place]        



 - TRADITIONAL        

 

 *SELF PAY*        

 

 SLIDING FEE SCHEDULE -        



 DISCOUNT        

 

 [place]        



 - TRADITIONAL        

 

 *SELF PAY*        

 

 SLIDING FEE SCHEDULE -        



 DISCOUNT        

 

 [place]        



 - TRADITIONAL        

 

 *SELF PAY*        

 

 SLIDING FEE SCHEDULE -        



 DISCOUNT        

 

 [place]        



 - TRADITIONAL        

 

 *SELF PAY*        

 

 SLIDING FEE SCHEDULE -        



 DISCOUNT        

 

 [place] ACS - TM[place]        



 - TRADITIONAL        

 

 *SELF PAY*        

 

 SLIDING FEE SCHEDULE -        



 DISCOUNT        

 

 [place]        



 - TRADITIONAL        

 

 *SELF PAY*        

 

 [place]        



 - TRADITIONAL        

 

 *SELF PAY*        

 

 SLIDING FEE SCHEDULE -        



 DISCOUNT        

 

 [place]        



 - TRADITIONAL        

 

 *SELF PAY*        

 

 SLIDING FEE SCHEDULE -        



 DISCOUNT        

 

 [place] ACS - TM[place]        



 - TRADITIONAL        

 

 *SELF PAY*        

 

 SLIDING FEE SCHEDULE -        



 DISCOUNT        

 

 [place]        



 - TRADITIONAL        

 

 *SELF PAY*        

 

 SLIDING FEE SCHEDULE -        



 DISCOUNT        

 

 [place]        



 - TRADITIONAL        

 

 *SELF PAY*        

 

 SLIDING FEE SCHEDULE -        



 DISCOUNT        

 

 [place] - [place]        



 - TRADITIONAL        

 

 *SELF PAY*        

 

 SLIDING FEE SCHEDULE -        



 DISCOUNT        

 

 [place]        



 - TRADITIONAL        

 

 *SELF PAY*        

 

 SLIDING FEE SCHEDULE -        



 DISCOUNT        

 

 [place]        



 - TRADITIONAL        

 

 *SELF PAY*        

 

 SLIDING FEE SCHEDULE -        



 DISCOUNT        

 

 [place] ACS - TM[place]        



 - TRADITIONAL        

 

 *SELF PAY*        

 

 SLIDING FEE SCHEDULE -        



 DISCOUNT        

 

 [place] ACS - TMHP        



 - TRADITIONAL        

 

 *SELF PAY*        

 

 SLIDING FEE SCHEDULE -        



 DISCOUNT        

 

 [place] ACS - TMHP        



 - TRADITIONAL        

 

 *SELF PAY*        

 

 SLIDING FEE SCHEDULE -        



 DISCOUNT        

 

 [place] ACS - TM[place]        



 - TRADITIONAL        

 

 *SELF PAY*        

 

 SLIDING FEE SCHEDULE -        



 DISCOUNT        

 

 [place] ACS - TM[place]        



 - TRADITIONAL        

 

 *SELF PAY*        

 

 SLIDING FEE SCHEDULE -        



 DISCOUNT        

 

 [place] ACS - TM[place]        



 - TRADITIONAL        

 

 *SELF PAY*        

 

 SLIDING FEE SCHEDULE -        



 DISCOUNT        

 

 [place] ACS - TM[place]        



 - TRADITIONAL        

 

 *SELF PAY*        

 

 SLIDING FEE SCHEDULE -        



 DISCOUNT        

 

 [place] ACS - TM[place]        



 - TRADITIONAL        

 

 *SELF PAY*        

 

 SLIDING FEE SCHEDULE -        



 DISCOUNT        

 

 [place] ACS [place]        



 - TRADITIONAL        

 

 *SELF PAY*        

 

 SLIDING FEE SCHEDULE -        



 DISCOUNT        

 

 AETNA (PPO)        

 

 [place] ACS - [place]        



 - TRADITIONAL        

 

 *SELF PAY*        

 

 SLIDING FEE SCHEDULE -        



 DISCOUNT        

 

 AETNA (PPO)        

 

 [place] ACS - TM[place]        



 - TRADITIONAL        

 

 *SELF PAY*        

 

 SLIDING FEE SCHEDULE -        



 DISCOUNT        

 

 AETNA (PPO)        

 

 [place] ACS - TM[place]        



 - TRADITIONAL        

 

 *SELF PAY*        

 

 SLIDING FEE SCHEDULE -        



 DISCOUNT        

 

 AETNA (PPO)        

 

 [place] ACS - TMHP        



 - TRADITIONAL        

 

 *SELF PAY*        

 

 SLIDING FEE SCHEDULE -        



 DISCOUNT        

 

 AETNA (PPO)        

 

 [place] ACS - TM[place]        



 - TRADITIONAL        

 

 *SELF PAY*        

 

 SLIDING FEE SCHEDULE -        



 DISCOUNT        

 

 AETNA (PPO)        

 

 [place] ACS - TM[place]        



 - TRADITIONAL        

 

 *SELF PAY*        

 

 SLIDING FEE SCHEDULE -        



 DISCOUNT        

 

 AETNA (PPO)        

 

 [place] ACS - [place]        



 - TRADITIONAL        

 

 *SELF PAY*        

 

 SLIDING FEE SCHEDULE -        



 DISCOUNT        

 

 AETNA (PPO)        

 

 [place]        



 - TRADITIONAL        

 

 *SELF PAY*        

 

 SLIDING FEE SCHEDULE -        



 DISCOUNT        

 

 [facility] [place]        



 (        

 

 AETNA (PPO)        

 

 [place]        



 - TRADITIONAL        

 

 *SELF PAY*        

 

 SLIDING FEE SCHEDULE -        



 DISCOUNT        

 

 AETNA (PPO)        

 

 [place]        



 - TRADITIONAL        

 

 *SELF PAY*        

 

 SLIDING FEE SCHEDULE -        



 DISCOUNT        

 

 AETNA (PPO)        

 

 [place] ACS - [place]        



 - TRADITIONAL        

 

 *SELF PAY*        

 

 SLIDING FEE SCHEDULE -        



 DISCOUNT        

 

 AETNA (PPO)        

 

 [place] ACS - [place]        



 - TRADITIONAL        

 

 *SELF PAY*        

 

 SLIDING FEE SCHEDULE -        



 DISCOUNT        

 

 AETNA (PPO)        

 

 [place] ACS - TM[place]        



 - TRADITIONAL        

 

 *SELF PAY*        

 

 SLIDING FEE SCHEDULE -        



 DISCOUNT        

 

 AETNA (PPO)        

 

 [place] ACS - TM[place]        



 - TRADITIONAL        

 

 *SELF PAY*        

 

 SLIDING FEE SCHEDULE -        



 DISCOUNT        

 

 [facility] -        



 CHOICE (PPO)        

 

 [facility]        



 [employer]        



 (        

 

 AETNA (PPO)        

 

 [place] ACS - TMHP        



 - TRADITIONAL        

 

 *SELF PAY*        

 

 SLIDING FEE SCHEDULE -        



 DISCOUNT        

 

 [facility]        



 [place]        



 (        

 

 AETNA (PPO)        

 

 [place] ACS - TMHP        



 - TRADITIONAL        

 

 *SELF PAY*        

 

 SLIDING FEE SCHEDULE -        



 DISCOUNT        

 

 [facility]        



 [place]        



 (        

 

 AETNA (PPO)        

 

 [place] ACS - TM[place]        



 - TRADITIONAL        

 

 *SELF PAY*        

 

 SLIDING FEE SCHEDULE -        



 DISCOUNT        

 

 [facility]        



 [place]        



 (        

 

 AETNA (PPO)        

 

 [place] ACS - TM[place]        



 - TRADITIONAL        

 

 *SELF PAY*        

 

 SLIDING FEE SCHEDULE -        



 DISCOUNT        

 

 [facility]        



 [place]        



 (        

 

 AETNA (PPO)        

 

 [place] ACS - TM[place]        



 - TRADITIONAL        

 

 *SELF PAY*        

 

 SLIDING FEE SCHEDULE -        



 DISCOUNT        

 

 [facility]        



 [place]        



 (        

 

 AETNA (PPO)        

 

 [place] ACS - [place]        



 - TRADITIONAL        

 

 *SELF PAY*        

 

 SLIDING FEE SCHEDULE -        



 DISCOUNT        







Encounters







 Encounter  Performer  Location  Date

 

 Office Visit  GIORGIO Gallardo  [facility]  2015



     Practice  







Allergies, Adverse Reactions, Alerts







 Type  Substance  Reaction  Status

 

 Drug allergy  CODEINE    Active

 

 Drug allergy  IODINE    Active

 

 Drug allergy  CIPRO    Active

 

 Food allergy  SANSERT    Active

 

 Drug allergy  MYCIN    Active

 

 Drug allergy  IMITREX    Active







Problems







 Problem  Effective Dates  Problem Status

 

 SPRAIN FOOT  2013  Inactive

 

 FRACTURE, METATARSUS  2013  Inactive

 

 SPRAIN, ANKLE  2013  Inactive

 

 NONCOMPLIANCE W/MED TX PRS HAZARDS HLTH PERS HX  Mar 20, 2013  Active

 

 DEPRESSION    Active

 

 PREGNANCY EXAMINATION OR TEST POSITIVE RESULT  2013  Inactive

 

 ADVANCED MATERNAL AGE  2013  Active

 

 MIGRAINE HEADACHE  Aug 01, 2013  Active

 

 POSTPARTUM EXAMINATION  2014  Inactive

 

 UNSPECIFIED DISORDER OF REFRACTION&ACCOMMODATION  Aug 25, 2014  Active

 

 FITTING&ADJUSTMENT OF SPECTACLES&CONTACT LENSES  Aug 25, 2014  Active

 

 MIGRAINE  Aug 25, 2014  Active

 

 ALLERGIC RHINITIS  2014  Active

 

 PELVIC PAIN  2015  Active







Procedures







 Date  Description  Comments

 

 2013  smoking status  never smoker

 

 2003  vaginal Pap smear results  Done

 

 2013  smoking status  negative

 

 Aug 01, 2013  smoking status  unknown if ever smoked

 

 Aug 27, 2013  smoking status  never smoker

 

 2014  smoking status  Never smoker

 

 May 29, 2014  smoking status  unknown if ever smoked

 

 2014  smoking status  Unknown if ever smoked

 

 2014  smoking status  Unknown if ever smoked

 

 2015  smoking status  Unknown if ever smoked

 

 2015  smoking status  Never smoker

 

 2015  smoking status  Never smoker

 

 2015  smoking status  Never smoker







Medications







 Medication  Instructions  Start Date  Status

 

 LORTAB 5-500 MG TABS  1 PO tid PRN HEADACHE  Aug 01, 2013  Inactive

 

 ZITHROMAX 250 MG TABS  Take 2 tablets (500mg) by oral  Dec 02, 2013  Inactive



   route once daily for 1 day then    



   1 tablet (250mg)by oral route    



   once daily for 4 days.    

 

 AUGMENTIN 500-125 MG TABS  1 PO q 12 hrs  Dec 02, 2013  Inactive

 

 ZOFRAN 8 MG TABS  1 tab PO q 8hrs PRN nausea  2013  Inactive

 

 LORTAB 7.5-500 MG TABS  1 po q 6 hours prn severe  Aug 27, 2013  Inactive



   headache    

 

 CHIVO CONTOUR TEST STRP  Test 4 times per day.  2013  Inactive

 

 GLYBURIDE 2.5 MG TABS  1 tab PO q am  2013  Inactive

 

 CYCLOBENZAPRINE HCL 10 MG TABS  1 po TID prn  May 29, 2014  Inactive

 

 PROMETHAZINE HCL 25 MG TABS  1 PO Q 6 HOURS AS NEEDED  May 29, 2014  Inactive

 

 BUPAP  MG TABS  1 OR 2 PO Q 4 HOURS AS NEEDED;  May 29, 2014  Inactive



   NOT TO EXCEED 6 TABS PER DAY    

 

 CITRANATAL ASSURE 300 MG MISC  1 of each q day  2014  Inactive

 

 PROMETHAZINE HCL 25 MG TABS  1 PO Q 4 HOURS AS NEEDED FOR  2014  
Inactive



   NAUSEA/VOMITING    

 

 CYCLOBENZAPRINE HCL 10 MG TABS  1 PO TID PRN  2014  Inactive

 

 CAMBIA 50 MG PACK  DISSOLVE ONE PACKET IN 1 OUNCE  2014  Inactive



   OF WATER AND DRINK IMMEDIATELY    



   AT ONSET OF HEADACHE    

 

 PROZAC 40 MG CAPS  take one daily  2014  Active

 

 WELLBUTRIN  MG XR24H-TAB  take one daily  2014  Active

 

 ABILIFY 2 MG TABS    2015  Active

 

 TRAMADOL HCL 50 MG TABS  1 or 2 po qid prn pain  2015  Inactive

 

 PROMETHAZINE HCL 25 MG TABS  1 PO Q 4 HOURS AS NEEDED FOR  2015  
Inactive



   NAUSEA    

 

 CAMBIA 50 MG PACK  Dissolve 1 pack in one ounce of  2015  Inactive



   water and drink immediately    

 

 TRAMADOL HCL 50 MG TABS  1 po q 6hrs prn pain  2015  Active

 

 PROMETHAZINE HCL 25 MG TABS  1 PO Q 4-6 HOURS AS NEEDED  2015  Active

 

 CYCLOBENZAPRINE HCL 10 MG TABS  1 po 8hrs prn  2015  Active







Immunizations







 Vaccine  Date  Status

 

 hepatitis B vaccine #1 given  May 10, 1994  completed

 

 hepatitis B vaccine #2 given  1994  completed

 

 hepatitis B vaccine #3  1994  completed

 

 DPT immunization #1  Mar 07, 1974  completed

 

 DPT immunization #2  May 02, 1974  completed

 

 DPT immunization #3  1974  completed

 

 DPT immunization #4  Aug 27, 1975  completed

 

 DPT immunization #5  Oct 01, 1979  completed

 

 oral polio vaccine (OPV) #1  Mar 07, 1974  completed

 

 oral polio vaccine (OPV) #2  May 02, 1974  completed

 

 oral polio vaccine (OPV) #3  1974  completed

 

 oral polio vaccine (OPV) #4  1975  completed

 

 oral polio vaccine (OPV) #5  Oct 01, 1979  completed

 

 MMR (measles, mumps, rubella) virus immunization #1  1975  completed

 

 MMR (measles, mumps, rubella) virus immunization #2  Aug 30, 1995  completed

 

 dT (Diphtheria and Tetanus) booster given  May 10, 1994  completed

 

 hepatitis B vaccine #1 given  2013  completed







Vital Signs







 Date  Description  Test  Result

 

 2013  height E&M - 8302-2  HEIGHT  62 in

 

 2013  weight E&M - 3141-9  WEIGHT  252 lb

 

 2013  temperature E&M  TEMPERATURE  98.3 deg f

 

 2013  blood pressure, systolic - 8480-6  BP SYSTOLIC  120 mm Hg

 

 2013  blood pressure, diastolic - 8462-4  BP DIASTOLIC  78 mm Hg

 

 2013  weight E&M - 3141-9  WEIGHT  252 lb

 

 2013  temperature E&M  TEMPERATURE  98.8 deg f

 

 2013  blood pressure, systolic - 8480-6  BP SYSTOLIC  120 mm Hg

 

 2013  blood pressure, diastolic - 8462-4  BP DIASTOLIC  68 mm Hg

 

 2013  weight E&M - 3141-9  WEIGHT  252 lb

 

 2013  temperature E&M  TEMPERATURE  98.6 deg f

 

 2013  blood pressure, systolic - 8480-6  BP SYSTOLIC  120 mm Hg

 

 2013  blood pressure, diastolic - 8462-4  BP DIASTOLIC  60 mm Hg

 

 2013  weight E&M - 3141-9  WEIGHT  252 lb

 

 2013  temperature E&M  TEMPERATURE  98.6 deg f

 

 2013  blood pressure, systolic - 8480-6  BP SYSTOLIC  120 mm Hg

 

 2013  blood pressure, diastolic - 8462-4  BP DIASTOLIC  60 mm Hg

 

 Mar 20, 2013  weight E&M - 3141-9  WEIGHT  248 lb

 

 Mar 20, 2013  temperature E&M  TEMPERATURE  98.5 deg f

 

 Mar 20, 2013  blood pressure, systolic - 8480-6  BP SYSTOLIC  120 mm Hg

 

 Mar 20, 2013  blood pressure, diastolic - 8462-4  BP DIASTOLIC  60 mm Hg

 

 2013  weight E&M - 3141-9  WEIGHT  243 lb

 

 2013  blood pressure, systolic - 8480-6  BP SYSTOLIC  122 mm Hg

 

 2013  blood pressure, diastolic - 8462-4  BP DIASTOLIC  70 mm Hg

 

 2013  blood pressure, systolic - 8480-6  BP SYSTOLIC  130 mm Hg

 

 2013  blood pressure, diastolic - 8462-4  BP DIASTOLIC  85 mm Hg

 

 2013  weight E&M - 3141-9  WEIGHT  248 lb

 

 2013  respiratory rate E&M - 9279-1  RESP RATE  19 /min

 

 Aug 01, 2013  weight E&M - 3141-9  WEIGHT  245 lb

 

 Aug 01, 2013  temperature E&M  TEMPERATURE  97.8 deg f

 

 Aug 01, 2013  blood pressure, systolic - 8480-6  BP SYSTOLIC  124 mm Hg

 

 Aug 01, 2013  blood pressure, diastolic - 8462-4  BP DIASTOLIC  74 mm Hg

 

 Aug 01, 2013  pulse rate E&M - 8867-4  PULSE RATE  82 /min

 

 Aug 27, 2013  weight E&M - 3141-9  WEIGHT  249.2 lb

 

 Aug 27, 2013  temperature E&M  TEMPERATURE  98.3 deg f

 

 Aug 27, 2013  pulse rate E&M - 8867-4  PULSE RATE  60 /min

 

 Aug 27, 2013  blood pressure, systolic - 8480-6  BP SYSTOLIC  104 mm Hg

 

 Aug 27, 2013  blood pressure, diastolic - 8462-4  BP DIASTOLIC  60 mm Hg

 

 2014  weight E&M - 3141-9  WEIGHT  240 lb

 

 2014  blood pressure, systolic, sitting, left arm  BP SYS SIT L  128 
mm Hg

 

 2014  blood pressure, diastolic, sitting, left arm  BP CHRISTY SIT L  84 
mm Hg

 

 2014  blood pressure, systolic - 8480-6  BP SYSTOLIC  128 mm Hg

 

 2014  blood pressure, diastolic - 8462-4  BP DIASTOLIC  84 mm Hg

 

 Mar 18, 2014  weight E&M - 3141-9  WEIGHT  255 lb

 

 Mar 18, 2014  blood pressure, systolic - 8480-6  BP SYSTOLIC  125 mm Hg

 

 Mar 18, 2014  blood pressure, diastolic - 8462-4  BP DIASTOLIC  87 mm Hg

 

 May 29, 2014  weight E&M - 3141-9  WEIGHT  251 lb

 

 May 29, 2014  temperature E&M  TEMPERATURE  98.5 deg f

 

 May 29, 2014  pulse rate E&M - 8867-4  PULSE RATE  91 /min

 

 May 29, 2014  blood pressure, systolic - 8480-6  BP SYSTOLIC  129 mm Hg

 

 May 29, 2014  blood pressure, diastolic - 8462-4  BP DIASTOLIC  85 mm Hg

 

 May 29, 2014  respiratory rate E&M - 9279-1  RESP RATE  10 /min

 

 2014  weight E&M - 3141-9  WEIGHT  255 lb

 

 2014  temperature E&M  TEMPERATURE  99.1 deg f

 

 2014  blood pressure, systolic - 8480-6  BP SYSTOLIC  128 mm Hg

 

 2014  blood pressure, diastolic - 8462-4  BP DIASTOLIC  85 mm Hg

 

 2014  pulse rate E&M - 8867-4  PULSE RATE  88 /min

 

 2014  respiratory rate E&M - 9279-1  RESP RATE  10 /min

 

 2014  temperature E&M  TEMPERATURE  98.4 deg f

 

 2014  blood pressure, systolic - 8480-6  BP SYSTOLIC  131 mm Hg

 

 2014  blood pressure, diastolic - 8462-4  BP DIASTOLIC  80 mm Hg

 

 2014  pulse rate E&M - 8867-4  PULSE RATE  80 /min

 

 2014  height E&M - 8302-2  HEIGHT  62 in

 

 2014  respiratory rate E&M - 9279-1  RESP RATE  12 /min

 

 2015  weight E&M - 3141-9  WEIGHT  257 lb

 

 2015  pulse rate E&M - 8867-4  PULSE RATE  83 /min

 

 2015  blood pressure, systolic - 8480-6  BP SYSTOLIC  114 mm Hg

 

 2015  blood pressure, diastolic - 8462-4  BP DIASTOLIC  69 mm Hg

 

 2015  temperature E&M  TEMPERATURE  98.8 deg f

 

 2015  height E&M - 8302-2  HEIGHT  62 in

 

 2015  respiratory rate E&M - 9279-1  RESP RATE  10 /min

 

 2015  weight MICHELLE&YINKA - 3141-9  WEIGHT  259 lb

 

 2015  blood pressure, systolic - 8480-6  BP SYSTOLIC  129 mm Hg

 

 2015  blood pressure, diastolic - 8462-4  BP DIASTOLIC  80 mm Hg

 

 2015  temperature E&M  TEMPERATURE  99.1 deg f

 

 2015  pulse rate E&M - 8867-4  PULSE RATE  88 /min

 

 2015  respiratory rate E&M - 9279-1  RESP RATE  16 /min

 

 2015  weight MICHELLE&YINKA - 3141-9  WEIGHT  256 lb

 

 2015  blood pressure, systolic - 8480-6  BP SYSTOLIC  117 mm Hg

 

 2015  blood pressure, diastolic - 8462-4  BP DIASTOLIC  65 mm Hg

 

 2015  temperature E&M  TEMPERATURE  98.6 deg f

 

 2015  pulse rate E&M - 8867-4  PULSE RATE  100 /min

 

 2015  respiratory rate E&M - 9279-1  RESP RATE  16 /min

 

 2015  weight MICHELLE&YINKA - 3141-9  WEIGHT  254 lb

 

 2015  temperature E&M  TEMPERATURE  98.3 deg f

 

 2015  blood pressure, systolic - 8480-6  BP SYSTOLIC  119 mm Hg

 

 2015  blood pressure, diastolic - 8462-4  BP DIASTOLIC  86 mm Hg

 

 2015  pulse rate E&M - 8867-4  PULSE RATE  101 /min

 

 2015  respiratory rate E&M - 9279-1  RESP RATE  16 /min

 

 2015  weight MICHELLE&YINKA - 3141-9  WEIGHT  254 lb

 

 2015  temperature E&M  TEMPERATURE  98.4 deg f

 

 2015  blood pressure, systolic - 8480-6  BP SYSTOLIC  128 mm Hg

 

 2015  blood pressure, diastolic - 8462-4  BP DIASTOLIC  86 mm Hg

 

 2015  pulse rate E&M - 8867-4  PULSE RATE  90 /min

 

 2015  respiratory rate E&M - 9279-1  RESP RATE  18 /min







Results







 Date  Description  Test Name  Value  Reference  Interpretation  Status

 

 2013  varicella zoster  VARICELLA AB  1.03 null    High  



   antibody, IgG,          



   serum          

 

 2003  vaginal Pap smear  PAP SMEAR  Done null      



   results

## 2019-08-19 NOTE — XMS REPORT
Continuity of Care Document

 Created on:2014



Patient:ABHIJIT ARCOS

Sex:Female

:1975

External Reference #:[id number]





Demographics







 Address  [address] [place] [address]

 

 Phone  [phone]

 

 Preferred Language  Unknown

 

 Marital Status  Never 

 

 Mu-ism Affiliation  Unknown

 

 Race  Unknown

 

 Ethnic Group  Unknown









Author







 Organization  [facility]









Care Team Providers







 Name  Role  Phone

 

 MD Rashad, Lilliam  Unavailable  Unavailable









Insurance Providers







 Payer name  Policy type /  Policy ID  Covered party ID  Policy Rich



   Coverage type      

 

 [facility]        



 [id number]        

 

 AETNA PPO PRIMARY [id number]        

 

 AETNA (PPO)        

 

 AETNA (PPO)        

 

 *SELF PAY*        

 

 AETNA (PPO)        

 

 SLIDING FEE SCHEDULE -        



 DISCOUNT        

 

 AETNA (PPO)        

 

 AETNA (PPO)        

 

 AETNA (PPO)        

 

 AETNA (PPO)        

 

 AETNA (PPO)        

 

 AETNA (PPO)        

 

 AETNA (PPO)        

 

 AETNA (PPO)        

 

 AETNA (PPO)        

 

 AETNA (PPO)        

 

 AETNA (PPO)        

 

 AETNA (PPO)        

 

 AETNA (PPO)        

 

 AETNA (PPO)        

 

 AETNA (PPO)        

 

 AETNA (PPO)        

 

 AETNA (PPO)        

 

 *SELF PAY*        

 

 *SELF PAY*        

 

 *SELF PAY*        

 

 SLIDING FEE SCHEDULE -        



 DISCOUNT        

 

 *SELF PAY*        

 

 SLIDING FEE SCHEDULE -        



 DISCOUNT        

 

 *SELF PAY*        

 

 SLIDING FEE SCHEDULE -        



 DISCOUNT        

 

 [place] ACS - TMHP        



 - TRADITIONAL        

 

 AETNA (PPO)        

 

 *SELF PAY*        

 

 SLIDING FEE SCHEDULE -        



 DISCOUNT        

 

 AETNA (PPO)        

 

 *SELF PAY*        

 

 SLIDING FEE SCHEDULE -        



 DISCOUNT        

 

 [place] ACS - TMHP        



 - TRADITIONAL        

 

 AETNA (PPO)        

 

 *SELF PAY*        

 

 SLIDING FEE SCHEDULE -        



 DISCOUNT        

 

 AETNA (PPO)        

 

 *SELF PAY*        

 

 SLIDING FEE SCHEDULE -        



 DISCOUNT        

 

 AETNA (PPO)        

 

 *SELF PAY*        

 

 SLIDING FEE SCHEDULE -        



 DISCOUNT        

 

 AETNA (PPO)        

 

 *SELF PAY*        

 

 SLIDING FEE SCHEDULE -        



 DISCOUNT        







Encounters







 Encounter  Performer  Location  Date

 

 Lab Report  Lilliam Solorzano MD  [facility] [place]  2014







Allergies, Adverse Reactions, Alerts







 Type  Substance  Reaction  Status

 

 Drug allergy  CODEINE    Active

 

 Drug allergy  IODINE    Active

 

 Drug allergy  CIPRO    Active

 

 Food allergy  SANSERT    Active

 

 Drug allergy  MYCIN    Active

 

 Drug allergy  IMITREX    Active







Problems







 Problem  Effective Dates  Problem Status

 

 SPRAIN FOOT  2013  Active

 

 FRACTURE, METATARSUS  2013  Active

 

 SPRAIN, ANKLE  2013  Active

 

 NONCOMPLIANCE W/MED TX PRS HAZARDS HLTH PERS HX  Mar 20, 2013  Active

 

 DEPRESSION    Active

 

 PREGNANCY EXAMINATION OR TEST POSITIVE RESULT  2013  Active

 

 ADVANCED MATERNAL AGE  2013  Active

 

 MIGRAINE HEADACHE  Aug 01, 2013  Active

 

 POSTPARTUM EXAMINATION  2014  Active







Procedures







 Date  Description  Comments

 

 2013  smoking status  never smoker

 

 2003  vaginal Pap smear results  Done

 

 2013  smoking status  negative

 

 Aug 01, 2013  smoking status  unknown if ever smoked

 

 Aug 27, 2013  smoking status  never smoker

 

 2014  smoking status  never smoked







Medications







 Medication  Instructions  Start Date  Status

 

 LORTAB 5-500 MG TABS  1 PO tid PRN HEADACHE  Aug 01, 2013  Inactive

 

 ZITHROMAX 250 MG TABS  Take 2 tablets (500mg) by oral route  Dec 02, 2013  
Inactive



   once daily for 1 day then 1 tablet    



   (250mg)by oral route once daily for 4    



   days.    

 

 AUGMENTIN 500-125 MG TABS  1 PO q 12 hrs  Dec 02, 2013  Inactive

 

 ZOFRAN 8 MG TABS  1 tab PO q 8hrs PRN nausea  2013  Inactive

 

 LORTAB 7.5-500 MG TABS  1 po q 6 hours prn severe headache  Aug 27, 2013  
Inactive

 

 CHIVO CONTOUR TEST STRP  Test 4 times per day.  2013  Inactive

 

 GLYBURIDE 2.5 MG TABS  1 tab PO q am  2013  Inactive







Immunizations







 Vaccine  Date  Status

 

 hepatitis B vaccine #1 given  May 10, 1994  completed

 

 hepatitis B vaccine #2 given  1994  completed

 

 hepatitis B vaccine #3  1994  completed

 

 DPT immunization #1  Mar 07, 1974  completed

 

 DPT immunization #2  May 02, 1974  completed

 

 DPT immunization #3  1974  completed

 

 DPT immunization #4  Aug 27, 1975  completed

 

 DPT immunization #5  Oct 01, 1979  completed

 

 oral polio vaccine (OPV) #1  Mar 07, 1974  completed

 

 oral polio vaccine (OPV) #2  May 02, 1974  completed

 

 oral polio vaccine (OPV) #3  1974  completed

 

 oral polio vaccine (OPV) #4  1975  completed

 

 oral polio vaccine (OPV) #5  Oct 01, 1979  completed

 

 MMR (measles, mumps, rubella) virus immunization #1  1975  completed

 

 MMR (measles, mumps, rubella) virus immunization #2  Aug 30, 1995  completed

 

 dT (Diphtheria and Tetanus) booster given  May 10, 1994  completed

 

 hepatitis B vaccine #1 given  2013  completed







Vital Signs







 Date  Description  Test  Result

 

 2013  height E&M - 8302-2  HEIGHT  62 in

 

 2013  weight E&M - 3141-9  WEIGHT  252 lb

 

 2013  temperature E&M  TEMPERATURE  98.3 deg f

 

 2013  blood pressure, systolic - 8480-6  BP SYSTOLIC  120 mm Hg

 

 2013  blood pressure, diastolic - 8462-4  BP DIASTOLIC  78 mm Hg

 

 2013  weight E&M - 3141-9  WEIGHT  252 lb

 

 2013  temperature E&M  TEMPERATURE  98.8 deg f

 

 2013  blood pressure, systolic - 8480-6  BP SYSTOLIC  120 mm Hg

 

 2013  blood pressure, diastolic - 8462-4  BP DIASTOLIC  68 mm Hg

 

 2013  weight E&M - 3141-9  WEIGHT  252 lb

 

 2013  temperature E&M  TEMPERATURE  98.6 deg f

 

 2013  blood pressure, systolic - 8480-6  BP SYSTOLIC  120 mm Hg

 

 2013  blood pressure, diastolic - 8462-4  BP DIASTOLIC  60 mm Hg

 

 2013  weight E&M - 3141-9  WEIGHT  252 lb

 

 2013  temperature E&M  TEMPERATURE  98.6 deg f

 

 2013  blood pressure, systolic - 8480-6  BP SYSTOLIC  120 mm Hg

 

 2013  blood pressure, diastolic - 8462-4  BP DIASTOLIC  60 mm Hg

 

 Mar 20, 2013  weight E&M - 3141-9  WEIGHT  248 lb

 

 Mar 20, 2013  temperature E&M  TEMPERATURE  98.5 deg f

 

 Mar 20, 2013  blood pressure, systolic - 8480-6  BP SYSTOLIC  120 mm Hg

 

 Mar 20, 2013  blood pressure, diastolic - 8462-4  BP DIASTOLIC  60 mm Hg

 

 2013  weight E&M - 3141-9  WEIGHT  243 lb

 

 2013  blood pressure, systolic - 8480-6  BP SYSTOLIC  122 mm Hg

 

 2013  blood pressure, diastolic - 8462-4  BP DIASTOLIC  70 mm Hg

 

 2013  blood pressure, systolic - 8480-6  BP SYSTOLIC  130 mm Hg

 

 2013  blood pressure, diastolic - 8462-4  BP DIASTOLIC  85 mm Hg

 

 2013  weight E&M - 3141-9  WEIGHT  248 lb

 

 2013  respiratory rate E&M - 9279-1  RESP RATE  19 /min

 

 Aug 01, 2013  weight E&M - 3141-9  WEIGHT  245 lb

 

 Aug 01, 2013  temperature E&M  TEMPERATURE  97.8 deg f

 

 Aug 01, 2013  blood pressure, systolic - 8480-6  BP SYSTOLIC  124 mm Hg

 

 Aug 01, 2013  blood pressure, diastolic - 8462-4  BP DIASTOLIC  74 mm Hg

 

 Aug 01, 2013  pulse rate E&M - 8867-4  PULSE RATE  82 /min

 

 Aug 27, 2013  weight E&M - 3141-9  WEIGHT  249.2 lb

 

 Aug 27, 2013  temperature E&M  TEMPERATURE  98.3 deg f

 

 Aug 27, 2013  pulse rate E&M - 8867-4  PULSE RATE  60 /min

 

 Aug 27, 2013  blood pressure, systolic - 8480-6  BP SYSTOLIC  104 mm Hg

 

 Aug 27, 2013  blood pressure, diastolic - 8462-4  BP DIASTOLIC  60 mm Hg

 

 2014  weight MICHELLE&M - 3141-9  WEIGHT  240 lb

 

 2014  blood pressure, systolic, sitting, left arm  BP SYS SIT L  128 
mm Hg

 

 2014  blood pressure, diastolic, sitting, left arm  BP CHRISTY SIT L  84 
mm Hg

 

 2014  blood pressure, systolic - 8480-6  BP SYSTOLIC  128 mm Hg

 

 2014  blood pressure, diastolic - 8462-4  BP DIASTOLIC  84 mm Hg







Results







 Date  Description  Test Name  Value  Reference  Interpretation  Status

 

 2013  varicella zoster  VARICELLA AB  1.03 null    High  



   antibody, IgG,          



   serum          

 

 2003  vaginal Pap smear  PAP SMEAR  Done null      



   results

## 2019-08-19 NOTE — XMS REPORT
Continuity of Care Document

 Created on:2015



Patient:ABHIJIT ARCOS

Sex:Female

:1975

External Reference #:[id number]





Demographics







 Address  [address] [place] [id number]

 

 Phone  [phone]

 

 Preferred Language  Unknown

 

 Marital Status  Never 

 

 Zoroastrian Affiliation  Unknown

 

 Race  Unknown

 

 Ethnic Group  Unknown









Author







 Organization  [facility]









Care Team Providers







 Name  Role  Phone

 

 GIORGIO Harmon, Karan  Unavailable  Unavailable









Insurance Providers







 Payer name  Policy type /  Policy ID  Covered party ID  Policy Rich



   Coverage type      

 

 [facility]        



 [id number]        

 

 AETNA PPO PRIMARY [id number]        

 

 AETNA (PPO)        

 

 AETNA (PPO)        

 

 *SELF PAY*        

 

 AETNA (PPO)        

 

 SLIDING FEE SCHEDULE -        



 DISCOUNT        

 

 AETNA (PPO)        

 

 AETNA (PPO)        

 

 AETNA (PPO)        

 

 AETNA (PPO)        

 

 AETNA (PPO)        

 

 AETNA (PPO)        

 

 AETNA (PPO)        

 

 AETNA (PPO)        

 

 AETNA (PPO)        

 

 AETNA (PPO)        

 

 AETNA (PPO)        

 

 AETNA (PPO)        

 

 AETNA (PPO)        

 

 AETNA (PPO)        

 

 AETNA (PPO)        

 

 AETNA (PPO)        

 

 AETNA (PPO)        

 

 *SELF PAY*        

 

 *SELF PAY*        

 

 *SELF PAY*        

 

 SLIDING FEE SCHEDULE -        



 DISCOUNT        

 

 *SELF PAY*        

 

 SLIDING FEE SCHEDULE -        



 DISCOUNT        

 

 *SELF PAY*        

 

 SLIDING FEE SCHEDULE -        



 DISCOUNT        

 

 [place] ACS [place]        



 - TRADITIONAL        

 

 AETNA (PPO)        

 

 *SELF PAY*        

 

 SLIDING FEE SCHEDULE -        



 DISCOUNT        

 

 AETNA (PPO)        

 

 *SELF PAY*        

 

 SLIDING FEE SCHEDULE -        



 DISCOUNT        

 

 [place] ACS [place] TM[place]        



 - TRADITIONAL        

 

 AETNA (PPO)        

 

 *SELF PAY*        

 

 SLIDING FEE SCHEDULE -        



 DISCOUNT        

 

 AETNA (PPO)        

 

 *SELF PAY*        

 

 SLIDING FEE SCHEDULE -        



 DISCOUNT        

 

 AETNA (PPO)        

 

 *SELF PAY*        

 

 SLIDING FEE SCHEDULE -        



 DISCOUNT        

 

 AETNA (PPO)        

 

 *SELF PAY*        

 

 SLIDING FEE SCHEDULE -        



 DISCOUNT        

 

 AETNA (PPO)        

 

 *SELF PAY*        

 

 SLIDING FEE SCHEDULE -        



 DISCOUNT        

 

 AETNA (PPO)        

 

 *SELF PAY*        

 

 SLIDING FEE SCHEDULE -        



 DISCOUNT        

 

 AETNA (PPO)        

 

 *SELF PAY*        

 

 SLIDING FEE SCHEDULE -        



 DISCOUNT        

 

 [facility]        



 [employer] [place]        



 (        

 

 AETNA (PPO)        

 

 *SELF PAY*        

 

 SLIDING FEE SCHEDULE -        



 DISCOUNT        

 

 [facility] [place]        



 (        

 

 AETNA (PPO)        

 

 *SELF PAY*        

 

 SLIDING FEE SCHEDULE -        



 DISCOUNT        

 

 AETNA (PPO)        

 

 *SELF PAY*        

 

 SLIDING FEE SCHEDULE -        



 DISCOUNT        

 

 AETNA (PPO)        

 

 *SELF PAY*        

 

 SLIDING FEE SCHEDULE -        



 DISCOUNT        

 

 AETNA (PPO)        

 

 *SELF PAY*        

 

 SLIDING FEE SCHEDULE -        



 DISCOUNT        

 

 [place] ACS - TMHP        



 - TRADITIONAL        

 

 AETNA (PPO)        

 

 *SELF PAY*        

 

 SLIDING FEE SCHEDULE -        



 DISCOUNT        

 

 [place]        



 - TRADITIONAL        

 

 AETNA (PPO)        

 

 *SELF PAY*        

 

 SLIDING FEE SCHEDULE -        



 DISCOUNT        

 

 [place]        



 - TRADITIONAL        

 

 AETNA (PPO)        

 

 *SELF PAY*        

 

 SLIDING FEE SCHEDULE -        



 DISCOUNT        

 

 AETNA (PPO)        

 

 [place]        



 - TRADITIONAL        

 

 *SELF PAY*        

 

 SLIDING FEE SCHEDULE -        



 DISCOUNT        

 

 [place]        



 - TRADITIONAL        

 

 *SELF PAY*        

 

 SLIDING FEE SCHEDULE -        



 DISCOUNT        

 

 [place]        



 - TRADITIONAL        

 

 *SELF PAY*        

 

 SLIDING FEE SCHEDULE -        



 DISCOUNT        

 

 [place]        



 - TRADITIONAL        

 

 *SELF PAY*        

 

 SLIDING FEE SCHEDULE -        



 DISCOUNT        

 

 [place] - [place]        



 - TRADITIONAL        

 

 *SELF PAY*        

 

 SLIDING FEE SCHEDULE -        



 DISCOUNT        

 

 [place]        



 - TRADITIONAL        

 

 *SELF PAY*        

 

 [place] ACS [place]        



 - TRADITIONAL        

 

 *SELF PAY*        

 

 SLIDING FEE SCHEDULE -        



 DISCOUNT        

 

 [place]        



 - TRADITIONAL        

 

 *SELF PAY*        

 

 SLIDING FEE SCHEDULE -        



 DISCOUNT        

 

 [place] ACS - TM[place]        



 - TRADITIONAL        

 

 *SELF PAY*        

 

 SLIDING FEE SCHEDULE -        



 DISCOUNT        

 

 [place] ACS - [place]        



 - TRADITIONAL        

 

 *SELF PAY*        

 

 SLIDING FEE SCHEDULE -        



 DISCOUNT        

 

 [place] ACS - [place]        



 - TRADITIONAL        

 

 *SELF PAY*        

 

 SLIDING FEE SCHEDULE -        



 DISCOUNT        

 

 [place]        



 - TRADITIONAL        

 

 *SELF PAY*        

 

 SLIDING FEE SCHEDULE -        



 DISCOUNT        

 

 [place]        



 - TRADITIONAL        

 

 *SELF PAY*        

 

 SLIDING FEE SCHEDULE -        



 DISCOUNT        

 

 [place]        



 - TRADITIONAL        

 

 *SELF PAY*        

 

 SLIDING FEE SCHEDULE -        



 DISCOUNT        

 

 [place] ACS - [place]        



 - TRADITIONAL        

 

 *SELF PAY*        

 

 SLIDING FEE SCHEDULE -        



 DISCOUNT        

 

 [place] ACS - [place]        



 - TRADITIONAL        

 

 *SELF PAY*        

 

 SLIDING FEE SCHEDULE -        



 DISCOUNT        

 

 [place] ACS - [place]        



 - TRADITIONAL        

 

 *SELF PAY*        

 

 SLIDING FEE SCHEDULE -        



 DISCOUNT        

 

 [place] ACS [place]        



 - TRADITIONAL        

 

 *SELF PAY*        

 

 SLIDING FEE SCHEDULE -        



 DISCOUNT        

 

 [place]        



 - TRADITIONAL        

 

 *SELF PAY*        

 

 SLIDING FEE SCHEDULE -        



 DISCOUNT        

 

 [place] ACS [place]        



 - TRADITIONAL        

 

 *SELF PAY*        

 

 SLIDING FEE SCHEDULE -        



 DISCOUNT        

 

 [place]        



 - TRADITIONAL        

 

 *SELF PAY*        

 

 SLIDING FEE SCHEDULE -        



 DISCOUNT        

 

 [place]        



 - TRADITIONAL        

 

 *SELF PAY*        

 

 SLIDING FEE SCHEDULE -        



 DISCOUNT        

 

 [place]        



 - TRADITIONAL        

 

 *SELF PAY*        

 

 SLIDING FEE SCHEDULE -        



 DISCOUNT        

 

 AETNA (PPO)        

 

 [place] ACS - [place]        



 - TRADITIONAL        

 

 *SELF PAY*        

 

 SLIDING FEE SCHEDULE -        



 DISCOUNT        

 

 AETNA (PPO)        

 

 [place] ACS - [place]        



 - TRADITIONAL        

 

 *SELF PAY*        

 

 SLIDING FEE SCHEDULE -        



 DISCOUNT        

 

 AETNA (PPO)        

 

 [place] ACS - TM[place]        



 - TRADITIONAL        

 

 *SELF PAY*        

 

 SLIDING FEE SCHEDULE -        



 DISCOUNT        

 

 AETNA (PPO)        

 

 [place] ACS - TM[place]        



 - TRADITIONAL        

 

 *SELF PAY*        

 

 SLIDING FEE SCHEDULE -        



 DISCOUNT        

 

 AETNA (PPO)        

 

 [place] ACS - [place]        



 - TRADITIONAL        

 

 *SELF PAY*        

 

 SLIDING FEE SCHEDULE -        



 DISCOUNT        

 

 AETNA (PPO)        

 

 [place] ACS [place]        



 - TRADITIONAL        

 

 *SELF PAY*        

 

 SLIDING FEE SCHEDULE -        



 DISCOUNT        

 

 AETNA (PPO)        

 

 [place]        



 - TRADITIONAL        

 

 *SELF PAY*        

 

 SLIDING FEE SCHEDULE -        



 DISCOUNT        

 

 AETNA (PPO)        

 

 [place]        



 - TRADITIONAL        

 

 *SELF PAY*        

 

 SLIDING FEE SCHEDULE -        



 DISCOUNT        

 

 [facility] [place]        



 (        

 

 AETNA (PPO)        

 

 [place]        



 - TRADITIONAL        

 

 *SELF PAY*        

 

 SLIDING FEE SCHEDULE -        



 DISCOUNT        

 

 AETNA (PPO)        

 

 [place]        



 - TRADITIONAL        

 

 *SELF PAY*        

 

 SLIDING FEE SCHEDULE -        



 DISCOUNT        

 

 AETNA (PPO)        

 

 [place]        



 - TRADITIONAL        

 

 *SELF PAY*        

 

 SLIDING FEE SCHEDULE -        



 DISCOUNT        

 

 AETNA (PPO)        

 

 [place]        



 - TRADITIONAL        

 

 *SELF PAY*        

 

 SLIDING FEE SCHEDULE -        



 DISCOUNT        







Encounters







 Encounter  Performer  Location  Date

 

 Office Visit  GIORGIO Gallardo  [facility]  2015



     Practice  







Allergies, Adverse Reactions, Alerts







 Type  Substance  Reaction  Status

 

 Drug allergy  CODEINE    Active

 

 Drug allergy  IODINE    Active

 

 Drug allergy  CIPRO    Active

 

 Food allergy  SANSERT    Active

 

 Drug allergy  MYCIN    Active

 

 Drug allergy  IMITREX    Active







Problems







 Problem  Effective Dates  Problem Status

 

 SPRAIN FOOT  2013  Inactive

 

 FRACTURE, METATARSUS  2013  Inactive

 

 SPRAIN, ANKLE  2013  Inactive

 

 NONCOMPLIANCE W/MED TX PRS HAZARDS HLTH PERS HX  Mar 20, 2013  Active

 

 DEPRESSION    Active

 

 PREGNANCY EXAMINATION OR TEST POSITIVE RESULT  2013  Inactive

 

 ADVANCED MATERNAL AGE  2013  Active

 

 MIGRAINE HEADACHE  Aug 01, 2013  Active

 

 POSTPARTUM EXAMINATION  2014  Inactive

 

 UNSPECIFIED DISORDER OF REFRACTION&ACCOMMODATION  Aug 25, 2014  Active

 

 FITTING&ADJUSTMENT OF SPECTACLES&CONTACT LENSES  Aug 25, 2014  Active

 

 MIGRAINE  Aug 25, 2014  Active

 

 ALLERGIC RHINITIS  2014  Active

 

 PELVIC PAIN  2015  Active







Procedures







 Date  Description  Comments

 

 2013  smoking status  never smoker

 

 2003  vaginal Pap smear results  Done

 

 2013  smoking status  negative

 

 Aug 01, 2013  smoking status  unknown if ever smoked

 

 Aug 27, 2013  smoking status  never smoker

 

 2014  smoking status  Never smoker

 

 May 29, 2014  smoking status  unknown if ever smoked

 

 2014  smoking status  Unknown if ever smoked

 

 2014  smoking status  Unknown if ever smoked

 

 2015  smoking status  Unknown if ever smoked

 

 2015  smoking status  Never smoker







Medications







 Medication  Instructions  Start Date  Status

 

 LORTAB 5-500 MG TABS  1 PO tid PRN HEADACHE  Aug 01, 2013  Inactive

 

 ZITHROMAX 250 MG TABS  Take 2 tablets (500mg) by oral  Dec 02, 2013  Inactive



   route once daily for 1 day then    



   1 tablet (250mg)by oral route    



   once daily for 4 days.    

 

 AUGMENTIN 500-125 MG TABS  1 PO q 12 hrs  Dec 02, 2013  Inactive

 

 ZOFRAN 8 MG TABS  1 tab PO q 8hrs PRN nausea  2013  Inactive

 

 LORTAB 7.5-500 MG TABS  1 po q 6 hours prn severe  Aug 27, 2013  Inactive



   headache    

 

 CHIVO CONTOUR TEST STRP  Test 4 times per day.  2013  Inactive

 

 GLYBURIDE 2.5 MG TABS  1 tab PO q am  2013  Inactive

 

 CYCLOBENZAPRINE HCL 10 MG TABS  1 po TID prn  May 29, 2014  Inactive

 

 PROMETHAZINE HCL 25 MG TABS  1 PO Q 6 HOURS AS NEEDED  May 29, 2014  Inactive

 

 BUPAP  MG TABS  1 OR 2 PO Q 4 HOURS AS NEEDED;  May 29, 2014  Inactive



   NOT TO EXCEED 6 TABS PER DAY    

 

 CITRANATAL ASSURE 300 MG MISC  1 of each q day  2014  Inactive

 

 PROMETHAZINE HCL 25 MG TABS  1 PO Q 4 HOURS AS NEEDED FOR  2014  
Inactive



   NAUSEA/VOMITING    

 

 CYCLOBENZAPRINE HCL 10 MG TABS  1 PO TID PRN  2014  Inactive

 

 CAMBIA 50 MG PACK  DISSOLVE ONE PACKET IN 1 OUNCE  2014  Inactive



   OF WATER AND DRINK IMMEDIATELY    



   AT ONSET OF HEADACHE    

 

 PROZAC 40 MG CAPS  take one daily  2014  Active

 

 WELLBUTRIN  MG XR24H-TAB  take one daily  2014  Active

 

 ABILIFY 2 MG TABS    2015  Active

 

 TRAMADOL HCL 50 MG TABS  1 or 2 po qid prn pain  2015  Inactive

 

 PROMETHAZINE HCL 25 MG TABS  1 PO Q 4 HOURS AS NEEDED FOR  2015  
Inactive



   NAUSEA    

 

 CAMBIA 50 MG PACK  Dissolve 1 pack in one ounce of  2015  Inactive



   water and drink immediately    

 

 TRAMADOL HCL 50 MG TABS  1 po q 6hrs prn pain  2015  Active







Immunizations







 Vaccine  Date  Status

 

 hepatitis B vaccine #1 given  May 10, 1994  completed

 

 hepatitis B vaccine #2 given  1994  completed

 

 hepatitis B vaccine #3  1994  completed

 

 DPT immunization #1  Mar 07, 1974  completed

 

 DPT immunization #2  May 02, 1974  completed

 

 DPT immunization #3  1974  completed

 

 DPT immunization #4  Aug 27, 1975  completed

 

 DPT immunization #5  Oct 01, 1979  completed

 

 oral polio vaccine (OPV) #1  Mar 07, 1974  completed

 

 oral polio vaccine (OPV) #2  May 02, 1974  completed

 

 oral polio vaccine (OPV) #3  1974  completed

 

 oral polio vaccine (OPV) #4  1975  completed

 

 oral polio vaccine (OPV) #5  Oct 01, 1979  completed

 

 MMR (measles, mumps, rubella) virus immunization #1  1975  completed

 

 MMR (measles, mumps, rubella) virus immunization #2  Aug 30, 1995  completed

 

 dT (Diphtheria and Tetanus) booster given  May 10, 1994  completed

 

 hepatitis B vaccine #1 given  2013  completed







Vital Signs







 Date  Description  Test  Result

 

 2013  height E&M - 8302-2  HEIGHT  62 in

 

 2013  weight E&M - 3141-9  WEIGHT  252 lb

 

 2013  temperature E&M  TEMPERATURE  98.3 deg f

 

 2013  blood pressure, systolic - 8480-6  BP SYSTOLIC  120 mm Hg

 

 2013  blood pressure, diastolic - 8462-4  BP DIASTOLIC  78 mm Hg

 

 2013  weight E&M - 3141-9  WEIGHT  252 lb

 

 2013  temperature E&M  TEMPERATURE  98.8 deg f

 

 2013  blood pressure, systolic - 8480-6  BP SYSTOLIC  120 mm Hg

 

 2013  blood pressure, diastolic - 8462-4  BP DIASTOLIC  68 mm Hg

 

 2013  weight E&M - 3141-9  WEIGHT  252 lb

 

 2013  temperature E&M  TEMPERATURE  98.6 deg f

 

 2013  blood pressure, systolic - 8480-6  BP SYSTOLIC  120 mm Hg

 

 2013  blood pressure, diastolic - 8462-4  BP DIASTOLIC  60 mm Hg

 

 2013  weight E&M - 3141-9  WEIGHT  252 lb

 

 2013  temperature E&M  TEMPERATURE  98.6 deg f

 

 2013  blood pressure, systolic - 8480-6  BP SYSTOLIC  120 mm Hg

 

 2013  blood pressure, diastolic - 8462-4  BP DIASTOLIC  60 mm Hg

 

 Mar 20, 2013  weight E&M - 3141-9  WEIGHT  248 lb

 

 Mar 20, 2013  temperature E&M  TEMPERATURE  98.5 deg f

 

 Mar 20, 2013  blood pressure, systolic - 8480-6  BP SYSTOLIC  120 mm Hg

 

 Mar 20, 2013  blood pressure, diastolic - 8462-4  BP DIASTOLIC  60 mm Hg

 

 2013  weight E&M - 3141-9  WEIGHT  243 lb

 

 2013  blood pressure, systolic - 8480-6  BP SYSTOLIC  122 mm Hg

 

 2013  blood pressure, diastolic - 8462-4  BP DIASTOLIC  70 mm Hg

 

 2013  blood pressure, systolic - 8480-6  BP SYSTOLIC  130 mm Hg

 

 2013  blood pressure, diastolic - 8462-4  BP DIASTOLIC  85 mm Hg

 

 2013  weight E&M - 3141-9  WEIGHT  248 lb

 

 2013  respiratory rate E&M - 9279-1  RESP RATE  19 /min

 

 Aug 01, 2013  weight E&M - 3141-9  WEIGHT  245 lb

 

 Aug 01, 2013  temperature E&M  TEMPERATURE  97.8 deg f

 

 Aug 01, 2013  blood pressure, systolic - 8480-6  BP SYSTOLIC  124 mm Hg

 

 Aug 01, 2013  blood pressure, diastolic - 8462-4  BP DIASTOLIC  74 mm Hg

 

 Aug 01, 2013  pulse rate E&M - 8867-4  PULSE RATE  82 /min

 

 Aug 27, 2013  weight E&M - 3141-9  WEIGHT  249.2 lb

 

 Aug 27, 2013  temperature E&M  TEMPERATURE  98.3 deg f

 

 Aug 27, 2013  pulse rate E&M - 8867-4  PULSE RATE  60 /min

 

 Aug 27, 2013  blood pressure, systolic - 8480-6  BP SYSTOLIC  104 mm Hg

 

 Aug 27, 2013  blood pressure, diastolic - 8462-4  BP DIASTOLIC  60 mm Hg

 

 2014  weight E&M - 3141-9  WEIGHT  240 lb

 

 2014  blood pressure, systolic, sitting, left arm  BP SYS SIT L  128 
mm Hg

 

 2014  blood pressure, diastolic, sitting, left arm  BP CHRISTY SIT L  84 
mm Hg

 

 2014  blood pressure, systolic - 8480-6  BP SYSTOLIC  128 mm Hg

 

 2014  blood pressure, diastolic - 8462-4  BP DIASTOLIC  84 mm Hg

 

 Mar 18, 2014  weight E&M - 3141-9  WEIGHT  255 lb

 

 Mar 18, 2014  blood pressure, systolic - 8480-6  BP SYSTOLIC  125 mm Hg

 

 Mar 18, 2014  blood pressure, diastolic - 8462-4  BP DIASTOLIC  87 mm Hg

 

 May 29, 2014  weight E&M - 3141-9  WEIGHT  251 lb

 

 May 29, 2014  temperature E&M  TEMPERATURE  98.5 deg f

 

 May 29, 2014  pulse rate E&M - 8867-4  PULSE RATE  91 /min

 

 May 29, 2014  blood pressure, systolic - 8480-6  BP SYSTOLIC  129 mm Hg

 

 May 29, 2014  blood pressure, diastolic - 8462-4  BP DIASTOLIC  85 mm Hg

 

 May 29, 2014  respiratory rate E&M - 9279-1  RESP RATE  10 /min

 

 2014  weight E&M - 3141-9  WEIGHT  255 lb

 

 2014  temperature E&M  TEMPERATURE  99.1 deg f

 

 2014  blood pressure, systolic - 8480-6  BP SYSTOLIC  128 mm Hg

 

 2014  blood pressure, diastolic - 8462-4  BP DIASTOLIC  85 mm Hg

 

 2014  pulse rate E&M - 8867-4  PULSE RATE  88 /min

 

 2014  respiratory rate E&M - 9279-1  RESP RATE  10 /min

 

 2014  temperature E&M  TEMPERATURE  98.4 deg f

 

 2014  blood pressure, systolic - 8480-6  BP SYSTOLIC  131 mm Hg

 

 2014  blood pressure, diastolic - 8462-4  BP DIASTOLIC  80 mm Hg

 

 2014  pulse rate E&M - 8867-4  PULSE RATE  80 /min

 

 2014  height E&M - 8302-2  HEIGHT  62 in

 

 2014  respiratory rate E&M - 9279-1  RESP RATE  12 /min

 

 2015  weight MICHELLE&M - 3141-9  WEIGHT  257 lb

 

 2015  pulse rate E&M - 8867-4  PULSE RATE  83 /min

 

 2015  blood pressure, systolic - 8480-6  BP SYSTOLIC  114 mm Hg

 

 2015  blood pressure, diastolic - 8462-4  BP DIASTOLIC  69 mm Hg

 

 2015  temperature E&M  TEMPERATURE  98.8 deg f

 

 2015  height E&M - 8302-2  HEIGHT  62 in

 

 2015  respiratory rate E&M - 9279-1  RESP RATE  10 /min

 

 2015  weight MICHELLE&M - 3141-9  WEIGHT  259 lb

 

 2015  blood pressure, systolic - 8480-6  BP SYSTOLIC  129 mm Hg

 

 2015  blood pressure, diastolic - 8462-4  BP DIASTOLIC  80 mm Hg

 

 2015  temperature E&M  TEMPERATURE  99.1 deg f

 

 2015  pulse rate E&M - 8867-4  PULSE RATE  88 /min

 

 2015  respiratory rate E&M - 9279-1  RESP RATE  16 /min

 

 2015  weight MICHELLE&M - 3141-9  WEIGHT  256 lb

 

 2015  blood pressure, systolic - 8480-6  BP SYSTOLIC  117 mm Hg

 

 2015  blood pressure, diastolic - 8462-4  BP DIASTOLIC  65 mm Hg

 

 2015  temperature E&M  TEMPERATURE  98.6 deg f

 

 2015  pulse rate E&M - 8867-4  PULSE RATE  100 /min

 

 2015  respiratory rate E&M - 9279-1  RESP RATE  16 /min







Results







 Date  Description  Test Name  Value  Reference  Interpretation  Status

 

 2013  varicella zoster  VARICELLA AB  1.03 null    High  



   antibody, IgG,          



   serum          

 

 2003  vaginal Pap smear  PAP SMEAR  Done null      



   results

## 2019-08-19 NOTE — XMS REPORT
Continuity of Care Document

 Created on:2015



Patient:ABHIJIT ARCOS

Sex:Female

:1975

External Reference #:[id number]





Demographics







 Address  [address] [place] [id number]

 

 Phone  [phone]

 

 Preferred Language  Unknown

 

 Marital Status  Never 

 

 [employer]an Affiliation  Unknown

 

 Race  Unknown

 

 Ethnic Group  Unknown









Author







 Organization  [facility]









Care Team Providers







 Name  Role  Phone

 

 GIORGIO Harmon, Karan  Unavailable  Unavailable









Insurance Providers







 Payer name  Policy type /  Policy ID  Covered party ID  Policy Rich



   Coverage type      

 

 [facility]        



 [id number]        

 

 AETNA PPO PRIMARY [id number]        

 

 AETNA (PPO)        

 

 AETNA (PPO)        

 

 *SELF PAY*        

 

 AETNA (PPO)        

 

 SLIDING FEE SCHEDULE -        



 DISCOUNT        

 

 AETNA (PPO)        

 

 AETNA (PPO)        

 

 AETNA (PPO)        

 

 AETNA (PPO)        

 

 AETNA (PPO)        

 

 AETNA (PPO)        

 

 AETNA (PPO)        

 

 AETNA (PPO)        

 

 AETNA (PPO)        

 

 AETNA (PPO)        

 

 AETNA (PPO)        

 

 AETNA (PPO)        

 

 AETNA (PPO)        

 

 AETNA (PPO)        

 

 AETNA (PPO)        

 

 AETNA (PPO)        

 

 AETNA (PPO)        

 

 *SELF PAY*        

 

 *SELF PAY*        

 

 *SELF PAY*        

 

 SLIDING FEE SCHEDULE -        



 DISCOUNT        

 

 *SELF PAY*        

 

 SLIDING FEE SCHEDULE -        



 DISCOUNT        

 

 *SELF PAY*        

 

 SLIDING FEE SCHEDULE -        



 DISCOUNT        

 

 [place] TM[place]        



 - TRADITIONAL        

 

 AETNA (PPO)        

 

 *SELF PAY*        

 

 SLIDING FEE SCHEDULE -        



 DISCOUNT        

 

 AETNA (PPO)        

 

 *SELF PAY*        

 

 SLIDING FEE SCHEDULE -        



 DISCOUNT        

 

 [place]        



 - TRADITIONAL        

 

 AETNA (PPO)        

 

 *SELF PAY*        

 

 SLIDING FEE SCHEDULE -        



 DISCOUNT        

 

 AETNA (PPO)        

 

 *SELF PAY*        

 

 SLIDING FEE SCHEDULE -        



 DISCOUNT        

 

 AETNA (PPO)        

 

 *SELF PAY*        

 

 SLIDING FEE SCHEDULE -        



 DISCOUNT        

 

 AETNA (PPO)        

 

 *SELF PAY*        

 

 SLIDING FEE SCHEDULE -        



 DISCOUNT        

 

 AETNA (PPO)        

 

 *SELF PAY*        

 

 SLIDING FEE SCHEDULE -        



 DISCOUNT        

 

 AETNA (PPO)        

 

 *SELF PAY*        

 

 SLIDING FEE SCHEDULE -        



 DISCOUNT        

 

 AETNA (PPO)        

 

 *SELF PAY*        

 

 SLIDING FEE SCHEDULE -        



 DISCOUNT        

 

 [facility]        



 [employer] [place] [place]        



 (        

 

 AETNA (PPO)        

 

 *SELF PAY*        

 

 SLIDING FEE SCHEDULE -        



 DISCOUNT        

 

 [facility] [place]        



 (        

 

 AETNA (PPO)        

 

 *SELF PAY*        

 

 SLIDING FEE SCHEDULE -        



 DISCOUNT        

 

 AETNA (PPO)        

 

 *SELF PAY*        

 

 SLIDING FEE SCHEDULE -        



 DISCOUNT        

 

 AETNA (PPO)        

 

 *SELF PAY*        

 

 SLIDING FEE SCHEDULE -        



 DISCOUNT        

 

 AETNA (PPO)        

 

 *SELF PAY*        

 

 SLIDING FEE SCHEDULE -        



 DISCOUNT        

 

 [place] ACS [place] TMHP        



 - TRADITIONAL        

 

 AETNA (PPO)        

 

 *SELF PAY*        

 

 SLIDING FEE SCHEDULE -        



 DISCOUNT        

 

 [place]        



 - TRADITIONAL        

 

 AETNA (PPO)        

 

 *SELF PAY*        

 

 SLIDING FEE SCHEDULE -        



 DISCOUNT        

 

 [place]        



 - TRADITIONAL        

 

 AETNA (PPO)        

 

 *SELF PAY*        

 

 SLIDING FEE SCHEDULE -        



 DISCOUNT        

 

 AETNA [place]PPO[place]        

 

 [place]        



 - TRADITIONAL        

 

 *SELF PAY*        

 

 SLIDING FEE SCHEDULE -        



 DISCOUNT        

 

 [place]        



 - TRADITIONAL        

 

 *SELF PAY*        

 

 SLIDING FEE SCHEDULE -        



 DISCOUNT        

 

 [place]        



 - TRADITIONAL        

 

 *SELF PAY*        

 

 SLIDING FEE SCHEDULE -        



 DISCOUNT        

 

 [place]        



 - TRADITIONAL        

 

 *SELF PAY*        

 

 SLIDING FEE SCHEDULE -        



 DISCOUNT        

 

 [place]        



 - TRADITIONAL        

 

 *SELF PAY*        

 

 SLIDING FEE SCHEDULE -        



 DISCOUNT        

 

 [place]        



 - TRADITIONAL        

 

 *SELF PAY*        

 

 [place]        



 - TRADITIONAL        

 

 *SELF PAY*        

 

 SLIDING FEE SCHEDULE -        



 DISCOUNT        

 

 [place]        



 - TRADITIONAL        

 

 *SELF PAY*        

 

 SLIDING FEE SCHEDULE -        



 DISCOUNT        

 

 [place]        



 - TRADITIONAL        

 

 *SELF PAY*        

 

 SLIDING FEE SCHEDULE -        



 DISCOUNT        

 

 [place]        



 - TRADITIONAL        

 

 *SELF PAY*        

 

 SLIDING FEE SCHEDULE -        



 DISCOUNT        

 

 [place]        



 - TRADITIONAL        

 

 *SELF PAY*        

 

 SLIDING FEE SCHEDULE -        



 DISCOUNT        

 

 [place]        



 - TRADITIONAL        

 

 *SELF PAY*        

 

 SLIDING FEE SCHEDULE -        



 DISCOUNT        

 

 [place]        



 - TRADITIONAL        

 

 *SELF PAY*        

 

 SLIDING FEE SCHEDULE -        



 DISCOUNT        

 

 [place]        



 - TRADITIONAL        

 

 *SELF PAY*        

 

 SLIDING FEE SCHEDULE -        



 DISCOUNT        

 

 [place]        



 - TRADITIONAL        

 

 *SELF PAY*        

 

 SLIDING FEE SCHEDULE -        



 DISCOUNT        

 

 [place]        



 - TRADITIONAL        

 

 *SELF PAY*        

 

 SLIDING FEE SCHEDULE -        



 DISCOUNT        

 

 [place]        



 - TRADITIONAL        

 

 *SELF PAY*        

 

 SLIDING FEE SCHEDULE -        



 DISCOUNT        

 

 [place]        



 - TRADITIONAL        

 

 *SELF PAY*        

 

 SLIDING FEE SCHEDULE -        



 DISCOUNT        

 

 [place]        



 - TRADITIONAL        

 

 *SELF PAY*        

 

 SLIDING FEE SCHEDULE -        



 DISCOUNT        

 

 [place]        



 - TRADITIONAL        

 

 *SELF PAY*        

 

 SLIDING FEE SCHEDULE -        



 DISCOUNT        

 

 [place]        



 - TRADITIONAL        

 

 *SELF PAY*        

 

 SLIDING FEE SCHEDULE -        



 DISCOUNT        

 

 [place]        



 - TRADITIONAL        

 

 *SELF PAY*        

 

 SLIDING FEE SCHEDULE -        



 DISCOUNT        

 

 [place]        



 - TRADITIONAL        

 

 *SELF PAY*        

 

 SLIDING FEE SCHEDULE -        



 DISCOUNT        

 

 AETNA (PPO)        

 

 [place]        



 - TRADITIONAL        

 

 *SELF PAY*        

 

 SLIDING FEE SCHEDULE -        



 DISCOUNT        

 

 AETNA ([place]        

 

 [place]        



 - TRADITIONAL        

 

 *SELF PAY*        

 

 SLIDING FEE SCHEDULE -        



 DISCOUNT        

 

 AETNA (PPO)        

 

 [place]        



 - TRADITIONAL        

 

 *SELF PAY*        

 

 SLIDING FEE SCHEDULE -        



 DISCOUNT        

 

 AETNA (PPO)        

 

 [place] TM[place]        



 - TRADITIONAL        

 

 *SELF PAY*        

 

 SLIDING FEE SCHEDULE -        



 DISCOUNT        

 

 AETNA (PPO[place]        

 

 [place]        



 - TRADITIONAL        

 

 *SELF PAY*        

 

 SLIDING FEE SCHEDULE -        



 DISCOUNT        

 

 AETNA (PPO[place]        

 

 [place]        



 - TRADITIONAL        

 

 *SELF PAY*        

 

 SLIDING FEE SCHEDULE -        



 DISCOUNT        

 

 AETNA (PPO[place]        

 

 [place]        



 - TRADITIONAL        

 

 *SELF PAY*        

 

 SLIDING FEE SCHEDULE -        



 DISCOUNT        

 

 AETNA [place]PPO[place]        

 

 [place]        



 - TRADITIONAL        

 

 *SELF PAY*        

 

 SLIDING FEE SCHEDULE -        



 DISCOUNT        

 

 [facility] [place]        



 (        

 

 AETNA (PPO)        

 

 [place]        



 - TRADITIONAL        

 

 *SELF PAY*        

 

 SLIDING FEE SCHEDULE -        



 DISCOUNT        

 

 AETNA (PPO[place]        

 

 [place]        



 - TRADITIONAL        

 

 *SELF PAY*        

 

 SLIDING FEE SCHEDULE -        



 DISCOUNT        

 

 AETNA (PPO[place]        

 

 [place]        



 - TRADITIONAL        

 

 *SELF PAY*        

 

 SLIDING FEE SCHEDULE -        



 DISCOUNT        

 

 AETNA (PPO[place]        

 

 [place]        



 - TRADITIONAL        

 

 *SELF PAY*        

 

 SLIDING FEE SCHEDULE -        



 DISCOUNT        

 

 AETNA (PPO)        

 

 [place]        



 - TRADITIONAL        

 

 *SELF PAY*        

 

 SLIDING FEE SCHEDULE -        



 DISCOUNT        

 

 AETNA (PPO)        

 

 [place]        



 - TRADITIONAL        

 

 *SELF PAY*        

 

 SLIDING FEE SCHEDULE -        



 DISCOUNT        

 

 [facility] -        



 CHOICE (PPO)        

 

 [facility]        



 [employer] [place]        



 (        

 

 AETNA (PPO)        

 

 [place] TM[place]        



 - TRADITIONAL        

 

 *SELF PAY*        

 

 SLIDING FEE SCHEDULE -        



 DISCOUNT        

 

 [facility]        



 [place] [place]        



 (        

 

 AETNA (PPO)        

 

 [place] TM[place]        



 - TRADITIONAL        

 

 *SELF PAY*        

 

 SLIDING FEE SCHEDULE -        



 DISCOUNT        

 

 [facility]        



 [place] [place]        



 (        

 

 AETNA (PPO)        

 

 [place] TM[place]        



 - TRADITIONAL        

 

 *SELF PAY*        

 

 SLIDING FEE SCHEDULE -        



 DISCOUNT        

 

 [facility]        



 [place] [place]        



 (        

 

 AETNA (PPO)        

 

 [place]        



 - TRADITIONAL        

 

 *SELF PAY*        

 

 SLIDING FEE SCHEDULE -        



 DISCOUNT        







Encounters







 Encounter  Performer  Location  Date

 

 Office Visit  GIORGIO Gallardo  [facility]  2015



     Practice  







Allergies, Adverse Reactions, Alerts







 Type  Substance  Reaction  Status

 

 Drug allergy  CODEINE    Active

 

 Drug allergy  IODINE    Active

 

 Drug allergy  CIPRO    Active

 

 Food allergy  SANSERT    Active

 

 Drug allergy  MYCIN    Active

 

 Drug allergy  IMITREX    Active







Problems







 Problem  Effective Dates  Problem Status

 

 SPRAIN FOOT  2013  Inactive

 

 FRACTURE, METATARSUS  2013  Inactive

 

 SPRAIN, ANKLE  2013  Inactive

 

 NONCOMPLIANCE W/MED TX PRS HAZARDS HLTH PERS HX  Mar 20, 2013  Active

 

 DEPRESSION    Active

 

 PREGNANCY EXAMINATION OR TEST POSITIVE RESULT  2013  Inactive

 

 ADVANCED MATERNAL AGE  2013  Active

 

 MIGRAINE HEADACHE  Aug 01, 2013  Active

 

 POSTPARTUM EXAMINATION  2014  Inactive

 

 UNSPECIFIED DISORDER OF REFRACTION&ACCOMMODATION  Aug 25, 2014  Active

 

 FITTING&ADJUSTMENT OF SPECTACLES&CONTACT LENSES  Aug 25, 2014  Active

 

 MIGRAINE  Aug 25, 2014  Active

 

 ALLERGIC RHINITIS  2014  Active

 

 PELVIC PAIN  2015  Active







Procedures







 Date  Description  Comments

 

 2013  smoking status  never smoker

 

 2003  vaginal Pap smear results  Done

 

 2013  smoking status  negative

 

 Aug 01, 2013  smoking status  unknown if ever smoked

 

 Aug 27, 2013  smoking status  never smoker

 

 2014  smoking status  Never smoker

 

 May 29, 2014  smoking status  unknown if ever smoked

 

 2014  smoking status  Unknown if ever smoked

 

 2014  smoking status  Unknown if ever smoked

 

 2015  smoking status  Unknown if ever smoked

 

 2015  smoking status  Never smoker

 

 2015  smoking status  Never smoker







Medications







 Medication  Instructions  Start Date  Status

 

 LORTAB 5-500 MG TABS  1 PO tid PRN HEADACHE  Aug 01, 2013  Inactive

 

 ZITHROMAX 250 MG TABS  Take 2 tablets (500mg) by oral  Dec 02, 2013  Inactive



   route once daily for 1 day then    



   1 tablet (250mg)by oral route    



   once daily for 4 days.    

 

 AUGMENTIN 500-125 MG TABS  1 PO q 12 hrs  Dec 02, 2013  Inactive

 

 ZOFRAN 8 MG TABS  1 tab PO q 8hrs PRN nausea  2013  Inactive

 

 LORTAB 7.5-500 MG TABS  1 po q 6 hours prn severe  Aug 27, 2013  Inactive



   headache    

 

 CHIVO CONTOUR TEST STRP  Test 4 times per day.  2013  Inactive

 

 GLYBURIDE 2.5 MG TABS  1 tab PO q am  2013  Inactive

 

 CYCLOBENZAPRINE HCL 10 MG TABS  1 po TID prn  May 29, 2014  Inactive

 

 PROMETHAZINE HCL 25 MG TABS  1 PO Q 6 HOURS AS NEEDED  May 29, 2014  Inactive

 

 BUPAP  MG TABS  1 OR 2 PO Q 4 HOURS AS NEEDED;  May 29, 2014  Inactive



   NOT TO EXCEED 6 TABS PER DAY    

 

 CITRANATAL ASSURE 300 MG MISC  1 of each q day  2014  Inactive

 

 PROMETHAZINE HCL 25 MG TABS  1 PO Q 4 HOURS AS NEEDED FOR  2014  
Inactive



   NAUSEA/VOMITING    

 

 CYCLOBENZAPRINE HCL 10 MG TABS  1 PO TID PRN  2014  Inactive

 

 CAMBIA 50 MG PACK  DISSOLVE ONE PACKET IN 1 OUNCE  2014  Inactive



   OF WATER AND DRINK IMMEDIATELY    



   AT ONSET OF HEADACHE    

 

 PROZAC 40 MG CAPS  take one daily  2014  Active

 

 WELLBUTRIN  MG XR24H-TAB  take one daily  2014  Active

 

 ABILIFY 2 MG TABS    2015  Active

 

 TRAMADOL HCL 50 MG TABS  1 or 2 po qid prn pain  2015  Inactive

 

 PROMETHAZINE HCL 25 MG TABS  1 PO Q 4 HOURS AS NEEDED FOR  2015  
Inactive



   NAUSEA    

 

 CAMBIA 50 MG PACK  Dissolve 1 pack in one ounce of  2015  Inactive



   water and drink immediately    

 

 TRAMADOL HCL 50 MG TABS  1 po q 6hrs prn pain  2015  Active

 

 PROMETHAZINE HCL 25 MG TABS  1 PO Q 4-6 HOURS AS NEEDED  2015  Active







Immunizations







 Vaccine  Date  Status

 

 hepatitis B vaccine #1 given  May 10, 1994  completed

 

 hepatitis B vaccine #2 given  1994  completed

 

 hepatitis B vaccine #3  1994  completed

 

 DPT immunization #1  Mar 07, 1974  completed

 

 DPT immunization #2  May 02, 1974  completed

 

 DPT immunization #3  1974  completed

 

 DPT immunization #4  Aug 27, 1975  completed

 

 DPT immunization #5  Oct 01, 1979  completed

 

 oral polio vaccine (OPV) #1  Mar 07, 1974  completed

 

 oral polio vaccine (OPV) #2  May 02, 1974  completed

 

 oral polio vaccine (OPV) #3  1974  completed

 

 oral polio vaccine (OPV) #4  1975  completed

 

 oral polio vaccine (OPV) #5  Oct 01, 1979  completed

 

 MMR (measles, mumps, rubella) virus immunization #1  1975  completed

 

 MMR (measles, mumps, rubella) virus immunization #2  Aug 30, 1995  completed

 

 dT (Diphtheria and Tetanus) booster given  May 10, 1994  completed

 

 hepatitis B vaccine #1 given  2013  completed







Vital Signs







 Date  Description  Test  Result

 

 2013  height E&M - 8302-2  HEIGHT  62 in

 

 2013  weight E&M - 3141-9  WEIGHT  252 lb

 

 2013  temperature E&M  TEMPERATURE  98.3 deg f

 

 2013  blood pressure, systolic - 8480-6  BP SYSTOLIC  120 mm Hg

 

 2013  blood pressure, diastolic - 8462-4  BP DIASTOLIC  78 mm Hg

 

 2013  weight E&M - 3141-9  WEIGHT  252 lb

 

 2013  temperature E&M  TEMPERATURE  98.8 deg f

 

 2013  blood pressure, systolic - 8480-6  BP SYSTOLIC  120 mm Hg

 

 2013  blood pressure, diastolic - 8462-4  BP DIASTOLIC  68 mm Hg

 

 2013  weight E&M - 3141-9  WEIGHT  252 lb

 

 2013  temperature E&M  TEMPERATURE  98.6 deg f

 

 2013  blood pressure, systolic - 8480-6  BP SYSTOLIC  120 mm Hg

 

 2013  blood pressure, diastolic - 8462-4  BP DIASTOLIC  60 mm Hg

 

 2013  weight E&M - 3141-9  WEIGHT  252 lb

 

 2013  temperature E&M  TEMPERATURE  98.6 deg f

 

 2013  blood pressure, systolic - 8480-6  BP SYSTOLIC  120 mm Hg

 

 2013  blood pressure, diastolic - 8462-4  BP DIASTOLIC  60 mm Hg

 

 Mar 20, 2013  weight E&M - 3141-9  WEIGHT  248 lb

 

 Mar 20, 2013  temperature E&M  TEMPERATURE  98.5 deg f

 

 Mar 20, 2013  blood pressure, systolic - 8480-6  BP SYSTOLIC  120 mm Hg

 

 Mar 20, 2013  blood pressure, diastolic - 8462-4  BP DIASTOLIC  60 mm Hg

 

 2013  weight E&M - 3141-9  WEIGHT  243 lb

 

 2013  blood pressure, systolic - 8480-6  BP SYSTOLIC  122 mm Hg

 

 2013  blood pressure, diastolic - 8462-4  BP DIASTOLIC  70 mm Hg

 

 2013  blood pressure, systolic - 8480-6  BP SYSTOLIC  130 mm Hg

 

 2013  blood pressure, diastolic - 8462-4  BP DIASTOLIC  85 mm Hg

 

 2013  weight E&M - 3141-9  WEIGHT  248 lb

 

 2013  respiratory rate E&M - 9279-1  RESP RATE  19 /min

 

 Aug 01, 2013  weight E&M - 3141-9  WEIGHT  245 lb

 

 Aug 01, 2013  temperature E&M  TEMPERATURE  97.8 deg f

 

 Aug 01, 2013  blood pressure, systolic - 8480-6  BP SYSTOLIC  124 mm Hg

 

 Aug 01, 2013  blood pressure, diastolic - 8462-4  BP DIASTOLIC  74 mm Hg

 

 Aug 01, 2013  pulse rate E&M - 8867-4  PULSE RATE  82 /min

 

 Aug 27, 2013  weight E&M - 3141-9  WEIGHT  249.2 lb

 

 Aug 27, 2013  temperature E&M  TEMPERATURE  98.3 deg f

 

 Aug 27, 2013  pulse rate E&M - 8867-4  PULSE RATE  60 /min

 

 Aug 27, 2013  blood pressure, systolic - 8480-6  BP SYSTOLIC  104 mm Hg

 

 Aug 27, 2013  blood pressure, diastolic - 8462-4  BP DIASTOLIC  60 mm Hg

 

 2014  weight E&M - 3141-9  WEIGHT  240 lb

 

 2014  blood pressure, systolic, sitting, left arm  BP SYS SIT L  128 
mm Hg

 

 2014  blood pressure, diastolic, sitting, left arm  BP CHRISTY SIT L  84 
mm Hg

 

 2014  blood pressure, systolic - 8480-6  BP SYSTOLIC  128 mm Hg

 

 2014  blood pressure, diastolic - 8462-4  BP DIASTOLIC  84 mm Hg

 

 Mar 18, 2014  weight E&M - 3141-9  WEIGHT  255 lb

 

 Mar 18, 2014  blood pressure, systolic - 8480-6  BP SYSTOLIC  125 mm Hg

 

 Mar 18, 2014  blood pressure, diastolic - 8462-4  BP DIASTOLIC  87 mm Hg

 

 May 29, 2014  weight E&M - 3141-9  WEIGHT  251 lb

 

 May 29, 2014  temperature E&M  TEMPERATURE  98.5 deg f

 

 May 29, 2014  pulse rate E&M - 8867-4  PULSE RATE  91 /min

 

 May 29, 2014  blood pressure, systolic - 8480-6  BP SYSTOLIC  129 mm Hg

 

 May 29, 2014  blood pressure, diastolic - 8462-4  BP DIASTOLIC  85 mm Hg

 

 May 29, 2014  respiratory rate E&M - 9279-1  RESP RATE  10 /min

 

 2014  weight E&M - 3141-9  WEIGHT  255 lb

 

 2014  temperature E&M  TEMPERATURE  99.1 deg f

 

 2014  blood pressure, systolic - 8480-6  BP SYSTOLIC  128 mm Hg

 

 2014  blood pressure, diastolic - 8462-4  BP DIASTOLIC  85 mm Hg

 

 2014  pulse rate E&M - 8867-4  PULSE RATE  88 /min

 

 2014  respiratory rate E&M - 9279-1  RESP RATE  10 /min

 

 2014  temperature E&M  TEMPERATURE  98.4 deg f

 

 2014  blood pressure, systolic - 8480-6  BP SYSTOLIC  131 mm Hg

 

 2014  blood pressure, diastolic - 8462-4  BP DIASTOLIC  80 mm Hg

 

 2014  pulse rate E&M - 8867-4  PULSE RATE  80 /min

 

 2014  height E&M - 8302-2  HEIGHT  62 in

 

 2014  respiratory rate E&M - 9279-1  RESP RATE  12 /min

 

 2015  weight E&M - 3141-9  WEIGHT  257 lb

 

 2015  pulse rate E&M - 8867-4  PULSE RATE  83 /min

 

 2015  blood pressure, systolic - 8480-6  BP SYSTOLIC  114 mm Hg

 

 2015  blood pressure, diastolic - 8462-4  BP DIASTOLIC  69 mm Hg

 

 2015  temperature E&M  TEMPERATURE  98.8 deg f

 

 2015  height E&M - 8302-2  HEIGHT  62 in

 

 2015  respiratory rate E&M - 9279-1  RESP RATE  10 /min

 

 2015  weight E&M - 3141-9  WEIGHT  259 lb

 

 2015  blood pressure, systolic - 8480-6  BP SYSTOLIC  129 mm Hg

 

 2015  blood pressure, diastolic - 8462-4  BP DIASTOLIC  80 mm Hg

 

 2015  temperature E&M  TEMPERATURE  99.1 deg f

 

 2015  pulse rate E&M - 8867-4  PULSE RATE  88 /min

 

 2015  respiratory rate E&M - 9279-1  RESP RATE  16 /min

 

 2015  weight E&M - 3141-9  WEIGHT  256 lb

 

 2015  blood pressure, systolic - 8480-6  BP SYSTOLIC  117 mm Hg

 

 2015  blood pressure, diastolic - 8462-4  BP DIASTOLIC  65 mm Hg

 

 2015  temperature E&M  TEMPERATURE  98.6 deg f

 

 2015  pulse rate E&M - 8867-4  PULSE RATE  100 /min

 

 2015  respiratory rate E&M - 9279-1  RESP RATE  16 /min

 

 2015  weight E&M - 3141-9  WEIGHT  254 lb

 

 2015  temperature E&M  TEMPERATURE  98.3 deg f

 

 2015  blood pressure, systolic - 8480-6  BP SYSTOLIC  119 mm Hg

 

 2015  blood pressure, diastolic - 8462-4  BP DIASTOLIC  86 mm Hg

 

 2015  pulse rate E&M - 8867-4  PULSE RATE  101 /min

 

 2015  respiratory rate E&M - 9279-1  RESP RATE  16 /min







Results







 Date  Description  Test Name  Value  Reference  Interpretation  Status

 

 2013  varicella zoster  VARICELLA AB  1.03 null    High  



   antibody, IgG,          



   serum          

 

 2003  vaginal Pap smear  PAP SMEAR  Done null      



   results

## 2019-08-19 NOTE — XMS REPORT
Continuity of Care Document

 Created on:2015



Patient:ABHIJIT ARCOS

Sex:Female

:1975

External Reference #:[id number]





Demographics







 Address  [address] [place] [address]

 

 Phone  [phone]

 

 Preferred Language  Unknown

 

 Marital Status  Never 

 

 Anabaptist Affiliation  Unknown

 

 Race  Unknown

 

 Ethnic Group  Unknown









Author







 Organization  [facility]









Care Team Providers







 Name  Role  Phone

 

 LUCERO Ling Sharrone  Unavailable  Unavailable









Insurance Providers







 Payer name  Policy type /  Policy ID  Covered party ID  Policy Rich



   Coverage type      

 

 [employer] [facility]        



 [id number]        

 

 AETNA PPO PRIMARY [id number]        

 

 AETNA (PPO)        

 

 AETNA (PPO)        

 

 *SELF PAY*        

 

 AETNA (PPO)        

 

 SLIDING FEE SCHEDULE -        



 DISCOUNT        

 

 AETNA (PPO)        

 

 AETNA (PPO)        

 

 AETNA (PPO)        

 

 AETNA (PPO)        

 

 AETNA (PPO)        

 

 AETNA (PPO)        

 

 AETNA (PPO)        

 

 AETNA (PPO)        

 

 AETNA (PPO)        

 

 AETNA (PPO)        

 

 AETNA (PPO)        

 

 AETNA (PPO)        

 

 AETNA (PPO)        

 

 AETNA (PPO)        

 

 AETNA (PPO)        

 

 AETNA (PPO)        

 

 AETNA (PPO)        

 

 *SELF PAY*        

 

 *SELF PAY*        

 

 *SELF PAY*        

 

 SLIDING FEE SCHEDULE -        



 DISCOUNT        

 

 *SELF PAY*        

 

 SLIDING FEE SCHEDULE -        



 DISCOUNT        

 

 *SELF PAY*        

 

 SLIDING FEE SCHEDULE -        



 DISCOUNT        

 

 [place]        



 - TRADITIONAL        

 

 AETNA (PPO)        

 

 *SELF PAY*        

 

 SLIDING FEE SCHEDULE -        



 DISCOUNT        

 

 AETNA (PPO)        

 

 *SELF PAY*        

 

 SLIDING FEE SCHEDULE -        



 DISCOUNT        

 

 [place] TM[place]        



 - TRADITIONAL        

 

 AETNA (PPO)        

 

 *SELF PAY*        

 

 SLIDING FEE SCHEDULE -        



 DISCOUNT        

 

 AETNA (PPO)        

 

 *SELF PAY*        

 

 SLIDING FEE SCHEDULE -        



 DISCOUNT        

 

 AETNA (PPO)        

 

 *SELF PAY*        

 

 SLIDING FEE SCHEDULE -        



 DISCOUNT        

 

 AETNA (PPO)        

 

 *SELF PAY*        

 

 SLIDING FEE SCHEDULE -        



 DISCOUNT        

 

 AETNA (PPO)        

 

 *SELF PAY*        

 

 SLIDING FEE SCHEDULE -        



 DISCOUNT        

 

 AETNA (PPO)        

 

 *SELF PAY*        

 

 SLIDING FEE SCHEDULE -        



 DISCOUNT        

 

 AETNA (PPO)        

 

 *SELF PAY*        

 

 SLIDING FEE SCHEDULE -        



 DISCOUNT        

 

 [facility]        



 [employer] [place]        



 (        

 

 AETNA (PPO)        

 

 *SELF PAY*        

 

 SLIDING FEE SCHEDULE -        



 DISCOUNT        

 

 [facility] [place]        



 (        

 

 AETNA (PPO)        

 

 *SELF PAY*        

 

 SLIDING FEE SCHEDULE -        



 DISCOUNT        

 

 AETNA (PPO)        

 

 *SELF PAY*        

 

 SLIDING FEE SCHEDULE -        



 DISCOUNT        

 

 AETNA (PPO)        

 

 *SELF PAY*        

 

 SLIDING FEE SCHEDULE -        



 DISCOUNT        

 

 AETNA (PPO)        

 

 *SELF PAY*        

 

 SLIDING FEE SCHEDULE -        



 DISCOUNT        

 

 [place] TM[place]        



 - TRADITIONAL        

 

 AETNA (PPO)        

 

 *SELF PAY*        

 

 SLIDING FEE SCHEDULE -        



 DISCOUNT        

 

 [place]        



 - TRADITIONAL        

 

 AETNA (PPO)        

 

 *SELF PAY*        

 

 SLIDING FEE SCHEDULE -        



 DISCOUNT        

 

 [place]        



 - TRADITIONAL        

 

 AETNA (PPO)        

 

 *SELF PAY*        

 

 SLIDING FEE SCHEDULE -        



 DISCOUNT        

 

 AETNA (PPO)        

 

 [place]        



 - TRADITIONAL        

 

 *SELF PAY*        

 

 SLIDING FEE SCHEDULE -        



 DISCOUNT        

 

 [place]        



 - TRADITIONAL        

 

 *SELF PAY*        

 

 SLIDING FEE SCHEDULE -        



 DISCOUNT        

 

 [place]        



 - TRADITIONAL        

 

 *SELF PAY*        

 

 SLIDING FEE SCHEDULE -        



 DISCOUNT        

 

 [place]        



 - TRADITIONAL        

 

 *SELF PAY*        

 

 SLIDING FEE SCHEDULE -        



 DISCOUNT        

 

 [place] - [place]        



 - TRADITIONAL        

 

 *SELF PAY*        

 

 SLIDING FEE SCHEDULE -        



 DISCOUNT        

 

 [place]        



 - TRADITIONAL        

 

 *SELF PAY*        

 

 [place]        



 - TRADITIONAL        

 

 *SELF PAY*        

 

 SLIDING FEE SCHEDULE -        



 DISCOUNT        

 

 [place]        



 - TRADITIONAL        

 

 *SELF PAY*        

 

 SLIDING FEE SCHEDULE -        



 DISCOUNT        

 

 [place] - [place]        



 - TRADITIONAL        

 

 *SELF PAY*        

 

 SLIDING FEE SCHEDULE -        



 DISCOUNT        

 

 [place] - [place]        



 - TRADITIONAL        

 

 *SELF PAY*        

 

 SLIDING FEE SCHEDULE -        



 DISCOUNT        

 

 [place] - [place]        



 - TRADITIONAL        

 

 *SELF PAY*        

 

 SLIDING FEE SCHEDULE -        



 DISCOUNT        

 

 [place] - [place]        



 - TRADITIONAL        

 

 *SELF PAY*        

 

 SLIDING FEE SCHEDULE -        



 DISCOUNT        

 

 [place] - [place]        



 - TRADITIONAL        

 

 *SELF PAY*        

 

 SLIDING FEE SCHEDULE -        



 DISCOUNT        

 

 [place] - [place]        



 - TRADITIONAL        

 

 *SELF PAY*        

 

 SLIDING FEE SCHEDULE -        



 DISCOUNT        

 

 [place] ACS - [place]        



 - TRADITIONAL        

 

 *SELF PAY*        

 

 SLIDING FEE SCHEDULE -        



 DISCOUNT        

 

 [place] ACS - [place]        



 - TRADITIONAL        

 

 *SELF PAY*        

 

 SLIDING FEE SCHEDULE -        



 DISCOUNT        

 

 [place] ACS - TM[place]        



 - TRADITIONAL        

 

 *SELF PAY*        

 

 SLIDING FEE SCHEDULE -        



 DISCOUNT        

 

 [place] ACS - [place]        



 - TRADITIONAL        

 

 *SELF PAY*        

 

 SLIDING FEE SCHEDULE -        



 DISCOUNT        

 

 [place] ACS - [place]        



 - TRADITIONAL        

 

 *SELF PAY*        

 

 SLIDING FEE SCHEDULE -        



 DISCOUNT        

 

 [place] ACS - [place]        



 - TRADITIONAL        

 

 *SELF PAY*        

 

 SLIDING FEE SCHEDULE -        



 DISCOUNT        

 

 [place] ACS - TM[place]        



 - TRADITIONAL        

 

 *SELF PAY*        

 

 SLIDING FEE SCHEDULE -        



 DISCOUNT        

 

 [place] ACS - [place]        



 - TRADITIONAL        

 

 *SELF PAY*        

 

 SLIDING FEE SCHEDULE -        



 DISCOUNT        

 

 [place] ACS - [place]        



 - TRADITIONAL        

 

 *SELF PAY*        

 

 SLIDING FEE SCHEDULE -        



 DISCOUNT        

 

 AETNA (PPO)        

 

 [place] ACS - TM[place]        



 - TRADITIONAL        

 

 *SELF PAY*        

 

 SLIDING FEE SCHEDULE -        



 DISCOUNT        

 

 AETNA (PPO)        

 

 [place] ACS - TM[place]        



 - TRADITIONAL        

 

 *SELF PAY*        

 

 SLIDING FEE SCHEDULE -        



 DISCOUNT        

 

 AETNA (PPO)        

 

 [place] ACS - TM[place]        



 - TRADITIONAL        

 

 *SELF PAY*        

 

 SLIDING FEE SCHEDULE -        



 DISCOUNT        

 

 AETNA (PPO)        

 

 [place] ACS - TM[place]        



 - TRADITIONAL        

 

 *SELF PAY*        

 

 SLIDING FEE SCHEDULE -        



 DISCOUNT        







Encounters







 Encounter  Performer  Location  Date

 

 Office Visit  Dewey Ling PA-C  [facility]  



     Practice  







Allergies, Adverse Reactions, Alerts







 Type  Substance  Reaction  Status

 

 Drug allergy  CODEINE    Active

 

 Drug allergy  IODINE    Active

 

 Drug allergy  CIPRO    Active

 

 Food allergy  SANSERT    Active

 

 Drug allergy  MYCIN    Active

 

 Drug allergy  IMITREX    Active







Problems







 Problem  Effective Dates  Problem Status

 

 SPRAIN FOOT  2013  Inactive

 

 FRACTURE, METATARSUS  2013  Inactive

 

 SPRAIN, ANKLE  2013  Inactive

 

 NONCOMPLIANCE W/MED TX PRS HAZARDS HLTH PERS HX  Mar 20, 2013  Active

 

 DEPRESSION    Active

 

 PREGNANCY EXAMINATION OR TEST POSITIVE RESULT  2013  Inactive

 

 ADVANCED MATERNAL AGE  2013  Active

 

 MIGRAINE HEADACHE  Aug 01, 2013  Active

 

 POSTPARTUM EXAMINATION  2014  Inactive

 

 UNSPECIFIED DISORDER OF REFRACTION&ACCOMMODATION  Aug 25, 2014  Active

 

 FITTING&ADJUSTMENT OF SPECTACLES&CONTACT LENSES  Aug 25, 2014  Active

 

 MIGRAINE  Aug 25, 2014  Active

 

 ALLERGIC RHINITIS  2014  Active

 

 PELVIC PAIN  2015  Active







Procedures







 Date  Description  Comments

 

 2013  smoking status  never smoker

 

 2003  vaginal Pap smear results  Done

 

 2013  smoking status  negative

 

 Aug 01, 2013  smoking status  unknown if ever smoked

 

 Aug 27, 2013  smoking status  never smoker

 

 2014  smoking status  Never smoker

 

 May 29, 2014  smoking status  unknown if ever smoked

 

 2014  smoking status  Unknown if ever smoked

 

 2014  smoking status  Unknown if ever smoked

 

 2015  smoking status  Unknown if ever smoked







Medications







 Medication  Instructions  Start Date  Status

 

 LORTAB 5-500 MG TABS  1 PO tid PRN HEADACHE  Aug 01, 2013  Inactive

 

 ZITHROMAX 250 MG TABS  Take 2 tablets (500mg) by oral  Dec 02, 2013  Inactive



   route once daily for 1 day then    



   1 tablet (250mg)by oral route    



   once daily for 4 days.    

 

 AUGMENTIN 500-125 MG TABS  1 PO q 12 hrs  Dec 02, 2013  Inactive

 

 ZOFRAN 8 MG TABS  1 tab PO q 8hrs PRN nausea  2013  Inactive

 

 LORTAB 7.5-500 MG TABS  1 po q 6 hours prn severe  Aug 27, 2013  Inactive



   headache    

 

 CHIVO CONTOUR TEST STRP  Test 4 times per day.  2013  Inactive

 

 GLYBURIDE 2.5 MG TABS  1 tab PO q am  2013  Inactive

 

 CYCLOBENZAPRINE HCL 10 MG TABS  1 po TID prn  May 29, 2014  Inactive

 

 PROMETHAZINE HCL 25 MG TABS  1 PO Q 6 HOURS AS NEEDED  May 29, 2014  Inactive

 

 BUPAP  MG TABS  1 OR 2 PO Q 4 HOURS AS NEEDED;  May 29, 2014  Inactive



   NOT TO EXCEED 6 TABS PER DAY    

 

 CITRANATAL ASSURE 300 MG MISC  1 of each q day  2014  Inactive

 

 PROMETHAZINE HCL 25 MG TABS  1 PO Q 4 HOURS AS NEEDED FOR  2014  
Inactive



   NAUSEA/VOMITING    

 

 CYCLOBENZAPRINE HCL 10 MG TABS  1 PO TID PRN  2014  Inactive

 

 CAMBIA 50 MG PACK  DISSOLVE ONE PACKET IN 1 OUNCE  2014  Inactive



   OF WATER AND DRINK IMMEDIATELY    



   AT ONSET OF HEADACHE    

 

 PROZAC 40 MG CAPS  take one daily  2014  Active

 

 WELLBUTRIN  MG XR24H-TAB  take one daily  2014  Active

 

 CAMBIA 50 MG PACK  Dissolve 1 pack in one ounce of  2015  Active



   water and drink immediately    

 

 PROMETHAZINE HCL 25 MG TABS  1 PO Q 4 HOURS AS NEEDED FOR  2015  Active



   NAUSEA    

 

 TRAMADOL HCL 50 MG TABS  1 or 2 po qid prn pain  2015  Active







Immunizations







 Vaccine  Date  Status

 

 hepatitis B vaccine #1 given  May 10, 1994  completed

 

 hepatitis B vaccine #2 given  1994  completed

 

 hepatitis B vaccine #3  1994  completed

 

 DPT immunization #1  Mar 07, 1974  completed

 

 DPT immunization #2  May 02, 1974  completed

 

 DPT immunization #3  1974  completed

 

 DPT immunization #4  Aug 27, 1975  completed

 

 DPT immunization #5  Oct 01, 1979  completed

 

 oral polio vaccine (OPV) #1  Mar 07, 1974  completed

 

 oral polio vaccine (OPV) #2  May 02, 1974  completed

 

 oral polio vaccine (OPV) #3  1974  completed

 

 oral polio vaccine (OPV) #4  1975  completed

 

 oral polio vaccine (OPV) #5  Oct 01, 1979  completed

 

 MMR (measles, mumps, rubella) virus immunization #1  1975  completed

 

 MMR (measles, mumps, rubella) virus immunization #2  Aug 30, 1995  completed

 

 dT (Diphtheria and Tetanus) booster given  May 10, 1994  completed

 

 hepatitis B vaccine #1 given  2013  completed







Vital Signs







 Date  Description  Test  Result

 

 2013  height E&M - 8302-2  HEIGHT  62 in

 

 2013  weight E&M - 3141-9  WEIGHT  252 lb

 

 2013  temperature E&M  TEMPERATURE  98.3 deg f

 

 2013  blood pressure, systolic - 8480-6  BP SYSTOLIC  120 mm Hg

 

 2013  blood pressure, diastolic - 8462-4  BP DIASTOLIC  78 mm Hg

 

 2013  weight E&M - 3141-9  WEIGHT  252 lb

 

 2013  temperature E&M  TEMPERATURE  98.8 deg f

 

 2013  blood pressure, systolic - 8480-6  BP SYSTOLIC  120 mm Hg

 

 2013  blood pressure, diastolic - 8462-4  BP DIASTOLIC  68 mm Hg

 

 2013  weight E&M - 3141-9  WEIGHT  252 lb

 

 2013  temperature E&M  TEMPERATURE  98.6 deg f

 

 2013  blood pressure, systolic - 8480-6  BP SYSTOLIC  120 mm Hg

 

 2013  blood pressure, diastolic - 8462-4  BP DIASTOLIC  60 mm Hg

 

 2013  weight E&M - 3141-9  WEIGHT  252 lb

 

 2013  temperature E&M  TEMPERATURE  98.6 deg f

 

 2013  blood pressure, systolic - 8480-6  BP SYSTOLIC  120 mm Hg

 

 2013  blood pressure, diastolic - 8462-4  BP DIASTOLIC  60 mm Hg

 

 Mar 20, 2013  weight E&M - 3141-9  WEIGHT  248 lb

 

 Mar 20, 2013  temperature E&M  TEMPERATURE  98.5 deg f

 

 Mar 20, 2013  blood pressure, systolic - 8480-6  BP SYSTOLIC  120 mm Hg

 

 Mar 20, 2013  blood pressure, diastolic - 8462-4  BP DIASTOLIC  60 mm Hg

 

 2013  weight E&M - 3141-9  WEIGHT  243 lb

 

 2013  blood pressure, systolic - 8480-6  BP SYSTOLIC  122 mm Hg

 

 2013  blood pressure, diastolic - 8462-4  BP DIASTOLIC  70 mm Hg

 

 2013  blood pressure, systolic - 8480-6  BP SYSTOLIC  130 mm Hg

 

 2013  blood pressure, diastolic - 8462-4  BP DIASTOLIC  85 mm Hg

 

 2013  weight E&M - 3141-9  WEIGHT  248 lb

 

 2013  respiratory rate E&M - 9279-1  RESP RATE  19 /min

 

 Aug 01, 2013  weight E&M - 3141-9  WEIGHT  245 lb

 

 Aug 01, 2013  temperature E&M  TEMPERATURE  97.8 deg f

 

 Aug 01, 2013  blood pressure, systolic - 8480-6  BP SYSTOLIC  124 mm Hg

 

 Aug 01, 2013  blood pressure, diastolic - 8462-4  BP DIASTOLIC  74 mm Hg

 

 Aug 01, 2013  pulse rate E&M - 8867-4  PULSE RATE  82 /min

 

 Aug 27, 2013  weight E&M - 3141-9  WEIGHT  249.2 lb

 

 Aug 27, 2013  temperature E&M  TEMPERATURE  98.3 deg f

 

 Aug 27, 2013  pulse rate E&M - 8867-4  PULSE RATE  60 /min

 

 Aug 27, 2013  blood pressure, systolic - 8480-6  BP SYSTOLIC  104 mm Hg

 

 Aug 27, 2013  blood pressure, diastolic - 8462-4  BP DIASTOLIC  60 mm Hg

 

 2014  weight E&M - 3141-9  WEIGHT  240 lb

 

 2014  blood pressure, systolic, sitting, left arm  BP SYS SIT L  128 
mm Hg

 

 2014  blood pressure, diastolic, sitting, left arm  BP CHRISTY SIT L  84 
mm Hg

 

 2014  blood pressure, systolic - 8480-6  BP SYSTOLIC  128 mm Hg

 

 2014  blood pressure, diastolic - 8462-4  BP DIASTOLIC  84 mm Hg

 

 Mar 18, 2014  weight E&M - 3141-9  WEIGHT  255 lb

 

 Mar 18, 2014  blood pressure, systolic - 8480-6  BP SYSTOLIC  125 mm Hg

 

 Mar 18, 2014  blood pressure, diastolic - 8462-4  BP DIASTOLIC  87 mm Hg

 

 May 29, 2014  weight MICHELLE&M - 3141-9  WEIGHT  251 lb

 

 May 29, 2014  temperature E&M  TEMPERATURE  98.5 deg f

 

 May 29, 2014  pulse rate E&M - 8867-4  PULSE RATE  91 /min

 

 May 29, 2014  blood pressure, systolic - 8480-6  BP SYSTOLIC  129 mm Hg

 

 May 29, 2014  blood pressure, diastolic - 8462-4  BP DIASTOLIC  85 mm Hg

 

 May 29, 2014  respiratory rate E&M - 9279-1  RESP RATE  10 /min

 

 2014  weight MICHELLE&M - 3141-9  WEIGHT  255 lb

 

 2014  temperature E&M  TEMPERATURE  99.1 deg f

 

 2014  blood pressure, systolic - 8480-6  BP SYSTOLIC  128 mm Hg

 

 2014  blood pressure, diastolic - 8462-4  BP DIASTOLIC  85 mm Hg

 

 2014  pulse rate E&M - 8867-4  PULSE RATE  88 /min

 

 2014  respiratory rate E&M - 9279-1  RESP RATE  10 /min

 

 2014  temperature E&M  TEMPERATURE  98.4 deg f

 

 2014  blood pressure, systolic - 8480-6  BP SYSTOLIC  131 mm Hg

 

 2014  blood pressure, diastolic - 8462-4  BP DIASTOLIC  80 mm Hg

 

 2014  pulse rate E&M - 8867-4  PULSE RATE  80 /min

 

 2014  height E&M - 8302-2  HEIGHT  62 in

 

 2014  respiratory rate E&M - 9279-1  RESP RATE  12 /min

 

 2015  weight MICHELLE&YINKA - 3141-9  WEIGHT  257 lb

 

 2015  pulse rate E&M - 8867-4  PULSE RATE  83 /min

 

 2015  blood pressure, systolic - 8480-6  BP SYSTOLIC  114 mm Hg

 

 2015  blood pressure, diastolic - 8462-4  BP DIASTOLIC  69 mm Hg

 

 2015  temperature E&M  TEMPERATURE  98.8 deg f

 

 2015  height E&M - 8302-2  HEIGHT  62 in

 

 2015  respiratory rate E&M - 9279-1  RESP RATE  10 /min







Results







 Date  Description  Test Name  Value  Reference  Interpretation  Status

 

 2013  varicella zoster  VARICELLA AB  1.03 null    High  



   antibody, IgG,          



   serum          

 

 2003  vaginal Pap smear  PAP SMEAR  Done null      



   results

## 2019-08-19 NOTE — XMS REPORT
Continuity of Care Document

 Created on:2014



Patient:ABHIJIT ARCOS

Sex:Female

:1975

External Reference #:[id number]





Demographics







 Address  [address] [place] [address]

 

 Phone  [phone]

 

 Preferred Language  Unknown

 

 Marital Status  Never 

 

 Restoration Affiliation  Unknown

 

 Race  Unknown

 

 Ethnic Group  Unknown









Author







 Organization  [facility]









Care Team Providers







 Name  Role  Phone

 

 MD Rashad, Lilliam  Unavailable  Unavailable









Insurance Providers







 Payer name  Policy type /  Policy ID  Covered party ID  Policy Rich



   Coverage type      

 

 [facility]        



 [id number]        

 

 AETNA PPO PRIMARY [id number]        

 

 AETNA (PPO)        

 

 AETNA (PPO)        

 

 *SELF PAY*        

 

 AETNA (PPO)        

 

 SLIDING FEE SCHEDULE -        



 DISCOUNT        

 

 AETNA (PPO)        

 

 AETNA (PPO)        

 

 AETNA (PPO)        

 

 AETNA (PPO)        

 

 AETNA (PPO)        

 

 AETNA (PPO)        

 

 AETNA (PPO)        

 

 AETNA (PPO)        

 

 AETNA (PPO)        

 

 AETNA (PPO)        

 

 AETNA (PPO)        

 

 AETNA (PPO)        

 

 AETNA (PPO)        

 

 AETNA (PPO)        

 

 AETNA (PPO)        

 

 AETNA (PPO)        

 

 AETNA (PPO)        

 

 *SELF PAY*        

 

 *SELF PAY*        

 

 *SELF PAY*        

 

 SLIDING FEE SCHEDULE -        



 DISCOUNT        

 

 *SELF PAY*        

 

 SLIDING FEE SCHEDULE -        



 DISCOUNT        

 

 *SELF PAY*        

 

 SLIDING FEE SCHEDULE -        



 DISCOUNT        

 

 [place] ACS - TMHP        



 - TRADITIONAL        

 

 AETNA (PPO)        

 

 *SELF PAY*        

 

 SLIDING FEE SCHEDULE -        



 DISCOUNT        

 

 AETNA (PPO)        

 

 *SELF PAY*        

 

 SLIDING FEE SCHEDULE -        



 DISCOUNT        

 

 [place] ACS - TMHP        



 - TRADITIONAL        

 

 AETNA (PPO)        

 

 *SELF PAY*        

 

 SLIDING FEE SCHEDULE -        



 DISCOUNT        

 

 AETNA (PPO)        

 

 *SELF PAY*        

 

 SLIDING FEE SCHEDULE -        



 DISCOUNT        

 

 AETNA (PPO)        

 

 *SELF PAY*        

 

 SLIDING FEE SCHEDULE -        



 DISCOUNT        

 

 AETNA (PPO)        

 

 *SELF PAY*        

 

 SLIDING FEE SCHEDULE -        



 DISCOUNT        







Encounters







 Encounter  Performer  Location  Date

 

 Lab Report  Lilliam Solorzano MD  [facility] [place]  2014







Allergies, Adverse Reactions, Alerts







 Type  Substance  Reaction  Status

 

 Drug allergy  CODEINE    Active

 

 Drug allergy  IODINE    Active

 

 Drug allergy  CIPRO    Active

 

 Food allergy  SANSERT    Active

 

 Drug allergy  MYCIN    Active

 

 Drug allergy  IMITREX    Active







Problems







 Problem  Effective Dates  Problem Status

 

 SPRAIN FOOT  2013  Active

 

 FRACTURE, METATARSUS  2013  Active

 

 SPRAIN, ANKLE  2013  Active

 

 NONCOMPLIANCE W/MED TX PRS HAZARDS HLTH PERS HX  Mar 20, 2013  Active

 

 DEPRESSION    Active

 

 PREGNANCY EXAMINATION OR TEST POSITIVE RESULT  2013  Active

 

 ADVANCED MATERNAL AGE  2013  Active

 

 MIGRAINE HEADACHE  Aug 01, 2013  Active

 

 POSTPARTUM EXAMINATION  2014  Active







Procedures







 Date  Description  Comments

 

 2013  smoking status  never smoker

 

 2003  vaginal Pap smear results  Done

 

 2013  smoking status  negative

 

 Aug 01, 2013  smoking status  unknown if ever smoked

 

 Aug 27, 2013  smoking status  never smoker

 

 2014  smoking status  never smoked







Medications







 Medication  Instructions  Start Date  Status

 

 LORTAB 5-500 MG TABS  1 PO tid PRN HEADACHE  Aug 01, 2013  Inactive

 

 ZITHROMAX 250 MG TABS  Take 2 tablets (500mg) by oral route  Dec 02, 2013  
Inactive



   once daily for 1 day then 1 tablet    



   (250mg)by oral route once daily for 4    



   days.    

 

 AUGMENTIN 500-125 MG TABS  1 PO q 12 hrs  Dec 02, 2013  Inactive

 

 ZOFRAN 8 MG TABS  1 tab PO q 8hrs PRN nausea  2013  Inactive

 

 LORTAB 7.5-500 MG TABS  1 po q 6 hours prn severe headache  Aug 27, 2013  
Inactive

 

 CHIVO CONTOUR TEST STRP  Test 4 times per day.  2013  Inactive

 

 GLYBURIDE 2.5 MG TABS  1 tab PO q am  2013  Inactive







Immunizations







 Vaccine  Date  Status

 

 hepatitis B vaccine #1 given  May 10, 1994  completed

 

 hepatitis B vaccine #2 given  1994  completed

 

 hepatitis B vaccine #3  1994  completed

 

 DPT immunization #1  Mar 07, 1974  completed

 

 DPT immunization #2  May 02, 1974  completed

 

 DPT immunization #3  1974  completed

 

 DPT immunization #4  Aug 27, 1975  completed

 

 DPT immunization #5  Oct 01, 1979  completed

 

 oral polio vaccine (OPV) #1  Mar 07, 1974  completed

 

 oral polio vaccine (OPV) #2  May 02, 1974  completed

 

 oral polio vaccine (OPV) #3  1974  completed

 

 oral polio vaccine (OPV) #4  1975  completed

 

 oral polio vaccine (OPV) #5  Oct 01, 1979  completed

 

 MMR (measles, mumps, rubella) virus immunization #1  1975  completed

 

 MMR (measles, mumps, rubella) virus immunization #2  Aug 30, 1995  completed

 

 dT (Diphtheria and Tetanus) booster given  May 10, 1994  completed

 

 hepatitis B vaccine #1 given  2013  completed







Vital Signs







 Date  Description  Test  Result

 

 2013  height E&M - 8302-2  HEIGHT  62 in

 

 2013  weight E&M - 3141-9  WEIGHT  252 lb

 

 2013  temperature E&M  TEMPERATURE  98.3 deg f

 

 2013  blood pressure, systolic - 8480-6  BP SYSTOLIC  120 mm Hg

 

 2013  blood pressure, diastolic - 8462-4  BP DIASTOLIC  78 mm Hg

 

 2013  weight E&M - 3141-9  WEIGHT  252 lb

 

 2013  temperature E&M  TEMPERATURE  98.8 deg f

 

 2013  blood pressure, systolic - 8480-6  BP SYSTOLIC  120 mm Hg

 

 2013  blood pressure, diastolic - 8462-4  BP DIASTOLIC  68 mm Hg

 

 2013  weight E&M - 3141-9  WEIGHT  252 lb

 

 2013  temperature E&M  TEMPERATURE  98.6 deg f

 

 2013  blood pressure, systolic - 8480-6  BP SYSTOLIC  120 mm Hg

 

 2013  blood pressure, diastolic - 8462-4  BP DIASTOLIC  60 mm Hg

 

 2013  weight E&M - 3141-9  WEIGHT  252 lb

 

 2013  temperature E&M  TEMPERATURE  98.6 deg f

 

 2013  blood pressure, systolic - 8480-6  BP SYSTOLIC  120 mm Hg

 

 2013  blood pressure, diastolic - 8462-4  BP DIASTOLIC  60 mm Hg

 

 Mar 20, 2013  weight E&M - 3141-9  WEIGHT  248 lb

 

 Mar 20, 2013  temperature E&M  TEMPERATURE  98.5 deg f

 

 Mar 20, 2013  blood pressure, systolic - 8480-6  BP SYSTOLIC  120 mm Hg

 

 Mar 20, 2013  blood pressure, diastolic - 8462-4  BP DIASTOLIC  60 mm Hg

 

 2013  weight E&M - 3141-9  WEIGHT  243 lb

 

 2013  blood pressure, systolic - 8480-6  BP SYSTOLIC  122 mm Hg

 

 2013  blood pressure, diastolic - 8462-4  BP DIASTOLIC  70 mm Hg

 

 2013  blood pressure, systolic - 8480-6  BP SYSTOLIC  130 mm Hg

 

 2013  blood pressure, diastolic - 8462-4  BP DIASTOLIC  85 mm Hg

 

 2013  weight E&M - 3141-9  WEIGHT  248 lb

 

 2013  respiratory rate E&M - 9279-1  RESP RATE  19 /min

 

 Aug 01, 2013  weight E&M - 3141-9  WEIGHT  245 lb

 

 Aug 01, 2013  temperature E&M  TEMPERATURE  97.8 deg f

 

 Aug 01, 2013  blood pressure, systolic - 8480-6  BP SYSTOLIC  124 mm Hg

 

 Aug 01, 2013  blood pressure, diastolic - 8462-4  BP DIASTOLIC  74 mm Hg

 

 Aug 01, 2013  pulse rate E&M - 8867-4  PULSE RATE  82 /min

 

 Aug 27, 2013  weight E&M - 3141-9  WEIGHT  249.2 lb

 

 Aug 27, 2013  temperature E&M  TEMPERATURE  98.3 deg f

 

 Aug 27, 2013  pulse rate E&M - 8867-4  PULSE RATE  60 /min

 

 Aug 27, 2013  blood pressure, systolic - 8480-6  BP SYSTOLIC  104 mm Hg

 

 Aug 27, 2013  blood pressure, diastolic - 8462-4  BP DIASTOLIC  60 mm Hg

 

 2014  weight MICHELLE&M - 3141-9  WEIGHT  240 lb

 

 2014  blood pressure, systolic, sitting, left arm  BP SYS SIT L  128 
mm Hg

 

 2014  blood pressure, diastolic, sitting, left arm  BP CHRISTY SIT L  84 
mm Hg

 

 2014  blood pressure, systolic - 8480-6  BP SYSTOLIC  128 mm Hg

 

 2014  blood pressure, diastolic - 8462-4  BP DIASTOLIC  84 mm Hg







Results







 Date  Description  Test Name  Value  Reference  Interpretation  Status

 

 2013  varicella zoster  VARICELLA AB  1.03 null    High  



   antibody, IgG,          



   serum          

 

 2003  vaginal Pap smear  PAP SMEAR  Done null      



   results

## 2019-08-19 NOTE — XMS REPORT
Continuity of Care Document

 Created on:2014



Patient:ABHIJIT ARCOS

Sex:Female

:1975

External Reference #:[id number]





Demographics







 Address  [address] [place] [address]

 

 Phone  [phone]

 

 Preferred Language  Unknown

 

 Marital Status  Never 

 

 Anglican Affiliation  Unknown

 

 Race  Unknown

 

 Ethnic Group  Unknown









Author







 Organization  [facility]









Care Team Providers







 Name  Role  Phone

 

 MD Rashad, Lilliam  Unavailable  Unavailable









Insurance Providers







 Payer name  Policy type /  Policy ID  Covered party ID  Policy Rich



   Coverage type      

 

 [facility]        



 [id number]        

 

 AETNA PPO PRIMARY [id number]        

 

 AETNA (PPO)        

 

 AETNA (PPO)        

 

 *SELF PAY*        

 

 AETNA (PPO)        

 

 SLIDING FEE SCHEDULE -        



 DISCOUNT        

 

 AETNA (PPO)        

 

 AETNA (PPO)        

 

 AETNA (PPO)        

 

 AETNA (PPO)        

 

 AETNA (PPO)        

 

 AETNA (PPO)        

 

 AETNA (PPO)        

 

 AETNA (PPO)        

 

 AETNA (PPO)        

 

 AETNA (PPO)        

 

 AETNA (PPO)        

 

 AETNA (PPO)        

 

 AETNA (PPO)        

 

 AETNA (PPO)        

 

 AETNA (PPO)        

 

 AETNA (PPO)        

 

 AETNA (PPO)        

 

 *SELF PAY*        

 

 *SELF PAY*        

 

 *SELF PAY*        

 

 SLIDING FEE SCHEDULE -        



 DISCOUNT        

 

 *SELF PAY*        

 

 SLIDING FEE SCHEDULE -        



 DISCOUNT        

 

 *SELF PAY*        

 

 SLIDING FEE SCHEDULE -        



 DISCOUNT        

 

 [place] ACS - TMHP        



 - TRADITIONAL        

 

 AETNA (PPO)        

 

 *SELF PAY*        

 

 SLIDING FEE SCHEDULE -        



 DISCOUNT        

 

 AETNA (PPO)        

 

 *SELF PAY*        

 

 SLIDING FEE SCHEDULE -        



 DISCOUNT        

 

 [place] ACS - TMHP        



 - TRADITIONAL        

 

 AETNA (PPO)        

 

 *SELF PAY*        

 

 SLIDING FEE SCHEDULE -        



 DISCOUNT        

 

 AETNA (PPO)        

 

 *SELF PAY*        

 

 SLIDING FEE SCHEDULE -        



 DISCOUNT        

 

 AETNA (PPO)        

 

 *SELF PAY*        

 

 SLIDING FEE SCHEDULE -        



 DISCOUNT        

 

 AETNA (PPO)        

 

 *SELF PAY*        

 

 SLIDING FEE SCHEDULE -        



 DISCOUNT        

 

 AETNA (PPO)        

 

 *SELF PAY*        

 

 SLIDING FEE SCHEDULE -        



 DISCOUNT        

 

 AETNA (PPO)        

 

 *SELF PAY*        

 

 SLIDING FEE SCHEDULE -        



 DISCOUNT        

 

 AETNA (PPO)        

 

 *SELF PAY*        

 

 SLIDING FEE SCHEDULE -        



 DISCOUNT        

 

 [facility]        



 [employer] [place] [facility] [place]        



 (        

 

 AETNA (PPO)        

 

 *SELF PAY*        

 

 SLIDING FEE SCHEDULE -        



 DISCOUNT        

 

 [facility] [place] [facility]        



 (        

 

 AETNA (PPO)        

 

 *SELF PAY*        

 

 SLIDING FEE SCHEDULE -        



 DISCOUNT        

 

 AETNA (PPO)        

 

 *SELF PAY*        

 

 SLIDING FEE SCHEDULE -        



 DISCOUNT        

 

 AETNA (PPO)        

 

 *SELF PAY*        

 

 SLIDING FEE SCHEDULE -        



 DISCOUNT        

 

 AETNA (PPO)        

 

 *SELF PAY*        

 

 SLIDING FEE SCHEDULE -        



 DISCOUNT        

 

 [place] ACS - TMHP        



 - TRADITIONAL        

 

 AETNA (PPO)        

 

 *SELF PAY*        

 

 SLIDING FEE SCHEDULE -        



 DISCOUNT        







Encounters







 Encounter  Performer  Location  Date

 

 Office Visit  Lilliam Solorzano MD  [facility]  Mar 18, 
2014







Allergies, Adverse Reactions, Alerts







 Type  Substance  Reaction  Status

 

 Drug allergy  CODEINE    Active

 

 Drug allergy  IODINE    Active

 

 Drug allergy  CIPRO    Active

 

 Food allergy  SANSERT    Active

 

 Drug allergy  MYCIN    Active

 

 Drug allergy  IMITREX    Active







Problems







 Problem  Effective Dates  Problem Status

 

 SPRAIN FOOT  2013  Active

 

 FRACTURE, METATARSUS  2013  Active

 

 SPRAIN, ANKLE  2013  Active

 

 NONCOMPLIANCE W/MED TX PRS HAZARDS HLTH PERS HX  Mar 20, 2013  Active

 

 DEPRESSION    Active

 

 PREGNANCY EXAMINATION OR TEST POSITIVE RESULT  2013  Active

 

 ADVANCED MATERNAL AGE  2013  Active

 

 MIGRAINE HEADACHE  Aug 01, 2013  Active

 

 POSTPARTUM EXAMINATION  2014  Active







Procedures







 Date  Description  Comments

 

 2013  smoking status  never smoker

 

 2003  vaginal Pap smear results  Done

 

 2013  smoking status  negative

 

 Aug 01, 2013  smoking status  unknown if ever smoked

 

 Aug 27, 2013  smoking status  never smoker

 

 2014  smoking status  never smoked







Medications







 Medication  Instructions  Start Date  Status

 

 LORTAB 5-500 MG TABS  1 PO tid PRN HEADACHE  Aug 01, 2013  Inactive

 

 ZITHROMAX 250 MG TABS  Take 2 tablets (500mg) by oral route  Dec 02, 2013  
Inactive



   once daily for 1 day then 1 tablet    



   (250mg)by oral route once daily for 4    



   days.    

 

 AUGMENTIN 500-125 MG TABS  1 PO q 12 hrs  Dec 02, 2013  Inactive

 

 ZOFRAN 8 MG TABS  1 tab PO q 8hrs PRN nausea  2013  Inactive

 

 LORTAB 7.5-500 MG TABS  1 po q 6 hours prn severe headache  Aug 27, 2013  
Inactive

 

 CHIVO CONTOUR TEST STRP  Test 4 times per day.  2013  Inactive

 

 GLYBURIDE 2.5 MG TABS  1 tab PO q am  2013  Inactive







Immunizations







 Vaccine  Date  Status

 

 hepatitis B vaccine #1 given  May 10, 1994  completed

 

 hepatitis B vaccine #2 given  1994  completed

 

 hepatitis B vaccine #3  1994  completed

 

 DPT immunization #1  Mar 07, 1974  completed

 

 DPT immunization #2  May 02, 1974  completed

 

 DPT immunization #3  1974  completed

 

 DPT immunization #4  Aug 27, 1975  completed

 

 DPT immunization #5  Oct 01, 1979  completed

 

 oral polio vaccine (OPV) #1  Mar 07, 1974  completed

 

 oral polio vaccine (OPV) #2  May 02, 1974  completed

 

 oral polio vaccine (OPV) #3  1974  completed

 

 oral polio vaccine (OPV) #4  1975  completed

 

 oral polio vaccine (OPV) #5  Oct 01, 1979  completed

 

 MMR (measles, mumps, rubella) virus immunization #1  1975  completed

 

 MMR (measles, mumps, rubella) virus immunization #2  Aug 30, 1995  completed

 

 dT (Diphtheria and Tetanus) booster given  May 10, 1994  completed

 

 hepatitis B vaccine #1 given  2013  completed







Vital Signs







 Date  Description  Test  Result

 

 2013  height E&M - 8302-2  HEIGHT  62 in

 

 2013  weight E&M - 3141-9  WEIGHT  252 lb

 

 2013  temperature E&M  TEMPERATURE  98.3 deg f

 

 2013  blood pressure, systolic - 8480-6  BP SYSTOLIC  120 mm Hg

 

 2013  blood pressure, diastolic - 8462-4  BP DIASTOLIC  78 mm Hg

 

 2013  weight E&M - 3141-9  WEIGHT  252 lb

 

 2013  temperature E&M  TEMPERATURE  98.8 deg f

 

 2013  blood pressure, systolic - 8480-6  BP SYSTOLIC  120 mm Hg

 

 2013  blood pressure, diastolic - 8462-4  BP DIASTOLIC  68 mm Hg

 

 2013  weight E&M - 3141-9  WEIGHT  252 lb

 

 2013  temperature E&M  TEMPERATURE  98.6 deg f

 

 2013  blood pressure, systolic - 8480-6  BP SYSTOLIC  120 mm Hg

 

 2013  blood pressure, diastolic - 8462-4  BP DIASTOLIC  60 mm Hg

 

 2013  weight E&M - 3141-9  WEIGHT  252 lb

 

 2013  temperature E&M  TEMPERATURE  98.6 deg f

 

 2013  blood pressure, systolic - 8480-6  BP SYSTOLIC  120 mm Hg

 

 2013  blood pressure, diastolic - 8462-4  BP DIASTOLIC  60 mm Hg

 

 Mar 20, 2013  weight E&M - 3141-9  WEIGHT  248 lb

 

 Mar 20, 2013  temperature E&M  TEMPERATURE  98.5 deg f

 

 Mar 20, 2013  blood pressure, systolic - 8480-6  BP SYSTOLIC  120 mm Hg

 

 Mar 20, 2013  blood pressure, diastolic - 8462-4  BP DIASTOLIC  60 mm Hg

 

 2013  weight E&M - 3141-9  WEIGHT  243 lb

 

 2013  blood pressure, systolic - 8480-6  BP SYSTOLIC  122 mm Hg

 

 2013  blood pressure, diastolic - 8462-4  BP DIASTOLIC  70 mm Hg

 

 2013  blood pressure, systolic - 8480-6  BP SYSTOLIC  130 mm Hg

 

 2013  blood pressure, diastolic - 8462-4  BP DIASTOLIC  85 mm Hg

 

 2013  weight E&M - 3141-9  WEIGHT  248 lb

 

 2013  respiratory rate E&M - 9279-1  RESP RATE  19 /min

 

 Aug 01, 2013  weight E&M - 3141-9  WEIGHT  245 lb

 

 Aug 01, 2013  temperature E&M  TEMPERATURE  97.8 deg f

 

 Aug 01, 2013  blood pressure, systolic - 8480-6  BP SYSTOLIC  124 mm Hg

 

 Aug 01, 2013  blood pressure, diastolic - 8462-4  BP DIASTOLIC  74 mm Hg

 

 Aug 01, 2013  pulse rate E&M - 8867-4  PULSE RATE  82 /min

 

 Aug 27, 2013  weight E&M - 3141-9  WEIGHT  249.2 lb

 

 Aug 27, 2013  temperature E&M  TEMPERATURE  98.3 deg f

 

 Aug 27, 2013  pulse rate E&M - 8867-4  PULSE RATE  60 /min

 

 Aug 27, 2013  blood pressure, systolic - 8480-6  BP SYSTOLIC  104 mm Hg

 

 Aug 27, 2013  blood pressure, diastolic - 8462-4  BP DIASTOLIC  60 mm Hg

 

 2014  weight E&M - 3141-9  WEIGHT  240 lb

 

 2014  blood pressure, systolic, sitting, left arm  BP SYS SIT L  128 
mm Hg

 

 2014  blood pressure, diastolic, sitting, left arm  BP CHRISTY SIT L  84 
mm Hg

 

 2014  blood pressure, systolic - 8480-6  BP SYSTOLIC  128 mm Hg

 

 2014  blood pressure, diastolic - 8462-4  BP DIASTOLIC  84 mm Hg

 

 Mar 18, 2014  weight E&M - 3141-9  WEIGHT  255 lb

 

 Mar 18, 2014  blood pressure, systolic - 8480-6  BP SYSTOLIC  125 mm Hg

 

 Mar 18, 2014  blood pressure, diastolic - 8462-4  BP DIASTOLIC  87 mm Hg







Results







 Date  Description  Test Name  Value  Reference  Interpretation  Status

 

 2013  varicella zoster  VARICELLA AB  1.03 null    High  



   antibody, IgG,          



   serum          

 

 2003  vaginal Pap smear  PAP SMEAR  Done null      



   results

## 2019-08-19 NOTE — RAD REPORT
EXAM DESCRIPTION:  RAD - Chest Single View - 8/19/2019 11:29 am

 

CLINICAL HISTORY:  CHEST PAIN

Chest pain.

 

COMPARISON:  No comparisons

 

FINDINGS:  Portable technique limits examination quality.

 

The lungs are underinflated resulting in vascular crowding. The heart is normal in size. No displaced
 fractures.

 

IMPRESSION:  Underinflated lungs.

## 2019-08-19 NOTE — XMS REPORT
Patient Summary Document

 Created on:2019



Patient:ABHIJIT ARCOS

Sex:Female

:1975

External Reference #:[id number]





Demographics







 Address  [address]



   [place] [address]

 

 Home Phone  [phone]

 

 Preferred Language  Unknown

 

 Marital Status  Unknown

 

 Baptism Affiliation  Unknown

 

 Race  Unknown

 

 Additional Race(s)  Unavailable

 

 Ethnic Group  Unknown









Author







 Organization  [facility]

 

 Address  [address] [place] [address] [address]



   [place] [address]

 

 Phone  [phone]









Care Team Providers







 Name  Role  Phone

 

 YUKI, DR MONIK MARTINEZ  Unavailable  Unavailable

 

 SEB, DR VIOLETA BEST  Unavailable  Unavailable

 

 JOSHUA, DR LINO SHEFFIELD  Unavailable  Unavailable

 

 MARCK, DR JONES  Unavailable  Unavailable

 

 ALMAZ, DR CLAUDINE CALLAHAN  Unavailable  Unavailable

 

 RG, DR DRISCOLL  Unavailable  Unavailable

 

 POTEPALOV, DR PABLO MEYER  Unavailable  Unavailable

 

 BA, DR HARRIS  Unavailable  Unavailable

 

 HOSSAIN, DR ZULEIMA PENA  Unavailable  Unavailable

 

 GORE, DR CÁRDENAS  Unavailable  Unavailable

 

 NUNES, DR BLACKBURN  Unavailable  Unavailable









Problems

This patient has no known problems.



Allergies, Adverse Reactions, Alerts

This patient has no known allergies or adverse reactions.



Medications

This patient has no known medications.



Encounters







 Start  End  Encounter  Admission  Attending  Care  Care  Encounter



 Date/Time  Date/Time  Type  Type  Clinicians  Facility  Department  ID

 

 2019    Outpatient      [facility]  CAR  [id number]



 15:09:26              

 

 2019  Emergency  E    [facility]  [facility]  [id number]



 15:15:00  15:15:00            

 

 2019-08-10  2019-08-10  Outpatient  E  SEB  [facility]  [id number]



 15:24:00  17:53:00      VIOLETA      

 

 2019  Emergency  E    [facility]  [facility]  [id number]



 14:32:00  14:32:00            

 

 2019  Outpatient  E  JOSHUA  [facility]  [id number]



 17:56:00  20:11:00      LINO      

 

 2019  Emergency  E  MARCK  FLORINDA  [facility]  [id number]



 15:47:00  18:15:00      ROBERT      

 

 2019  Outpatient  E  ALMAZ  [facility]  [id number]



 18:36:00  19:55:00      CLAUDINE      

 

 2019  Inpatient  E    [facility]  MED  [id number]



 13:49:00  13:49:00            

 

 2019  2019  Outpatient  E  JOSHUA,  [facility]  [id number]



 15:30:00  19:20:00      LINO      

 

 2019  Outpatient  E  RG,  [facility]  [place]  [id number]



 22:01:00  22:44:00      AMERICO      

 

 2019  Outpatient      [facility]  [id number]



 04:59:00  04:59:00            

 

 2019  Emergency  E    [facility]  [facility]  [id number]



 18:30:00  18:30:00            

 

 2019  Outpatient  E  POTEPALOV,  [facility]  [id number]



 18:12:00  22:19:00      PABLO      

 

 2019  Emergency  E  BA, KIMBERLY  [facility]  [id number]



 18:08:00  22:00:00            

 

 2019  Outpatient  E  JOSHUA,  [facility]  [id number]



 18:01:00  21:45:00      LINO      

 

 2018-10-21  2018-10-21  Outpatient  E  BA, KIMBERLY  [facility]  [id number]



 19:38:00  21:53:00            

 

 2018  Emergency  E  MUSE,  [facility]  [id number]



 16:42:00  18:00:00      CLAUDINE      

 

 2018  Outpatient  E  HOSSAIN,  [facility]  [id number]



 16:56:00  20:50:00      ZULEIMA      

 

 2017  Emergency  E  BA, KIMBERLY  [facility]  [id number]



 17:03:00  19:50:00            







Results







 Test Description  Test Time  Test Comments  Text Results  Atomic Results  
Result Comments









 URINE CULTURE  2019 10:21:00    









   Test Item  Value  Reference Range  Comments









 Isolate 1 (test code=ISO1)  Escherichia coli    

 

 ampicillin (test code=am)   ug/mL    

 

 piperacillin/tazobactam (test code=tzp)   ug/mL    

 

 cefazolin (test code=cz)   ug/mL    

 

 ceftazidime (test code=erik)   ug/mL    

 

 ceftriaxone1 (test code=ctr)   ug/mL    

 

 cefepime (test code=fep)   ug/mL    

 

 aztreonam (test code=azm)   ug/mL    

 

 ertapenem (test code=etp)   ug/mL    

 

 meropenem (test code=mem)   ug/mL    

 

 gentamicin (test code=gm)   ug/mL    

 

 tobramycin (test code=tob)   ug/mL    

 

 levofloxacin (test code=lev)   ug/mL    

 

 nitrofurantoin (test code=ftn)   ug/mL    

 

 trimethoprim/sulfamethoxazole (test code=sxt)   ug/mL    



DRUGS OF ABUSE*OW*2019 19:12:00





 Test Item  Value  Reference Range  Comments

 

 DRUG SCRN (test code=HDOA)  ****URINE DRUG SCREEN***    



   ***This is an unconfirmed    



   screening result and should not    



   be used for non-medical    



   purposes***    

 

 PHENCYCLID (test code=GPCP)  Negative  NEGATIVE  

 

 BENZODIAZE (test code=GBZO)  Negative  NEGATIVE  

 

 COCAINE (test code=GCOC)  Negative  NEGATIVE  

 

 AMPHETAMIN (test code=GAMP)  Negative  NEGATIVE  

 

 THC (test code=GTHC)  Negative  NEGATIVE  

 

 OPIATES (test code=FERNANDA)  Positive  NEGATIVE  

 

 BARBITURAT (test code=GBAR)  Negative  NEGATIVE  

 

 TCA (test code=GTCA)  Negative  NEGATIVE  

 

 DOAH (test code=DOAH)  **************URINE DRUG SCREEN    



   CUT OFF VALUES****************    



       



                   Amphetamines    



   1000    ng/mL        Barbituates    



            300  ng/mL    



   Benzodiazepines  300     ng/mL    



        Cocaine               300    



   ng/mL  Opiates    



   300     ng/mL    



   Phencyclidine       25   ng/mL    



   THC                       50    



    ng/mL        Tricyclic    



       



                   Antidepressants    



    1000 ng/mL    



   ********************************    



   ********************************    



   *******    



URINALYSIS W/O MICROSCOPIC**OW**2019 19:04:00





 Test Item  Value  Reference Range  Comments

 

 COLOR (test code=COLU)  Yellow  YELLOW  

 

 CLARITY (test code=CLA)  Cloudy  CLEAR  

 

 GLUCOSE UR (test code=UA  Negative  NEGATIVE  



 GLUCOSE)      

 

 BILI UR (test code=BILE)  Negative  NEGATIVE  

 

 KETONES UR (test code=ACE)  1+  NEGATIVE  

 

 SP GRAVITY (test code=SPGR)  1.025  1.005-1.030  

 

 PH UR (test code=PH)  6.0  4.5-8.0  

 

 PROTEIN UR (test code=PU)  1+  NEGATIVE  

 

 NITRITE UR (test  Positive  NEGATIVE  



 code=NITRITE)      

 

 UROBIL UR (test code=GUROQ)  1.0 E.U./dL    

 

 UROBIL UR (test code=GUROQC)  ***** UROBILINOGEN REFERENCE    



   RANGE *****    



                0.2 - 1.0 EU/dL    

 

 BLOOD UR (test code=UA  1+  NEGATIVE  



 BLOOD)      

 

 LEUK ES UR (test code=LEUK)  1+  NEGATIVE  



CHEM8+ i-STAT **OW**2019 19:04:00





 Test Item  Value  Reference Range  Comments

 

 SODIUM (test code=PEDRO)  138 mmol/L  138-146  

 

 POTASSIUM (test code=KI)  3.6 mmol/L  3.5-4.9  

 

 CHLORIDE (test code=CLI)  106 mmol/L    

 

 CA IONIZED (test code=ICAI)  0.97 mmol/L  1.12-1.32  

 

 GLUCOSE (test code=GLUI)  84 mg/dL    

 

 TCO2 (test code=TCO2)  27 mmol/L  24-29  

 

 BUN (test code=BUN1)  12 mg/dL  8-26  

 

 CREATININE (test code=CREAI)  0.7 mg/dL  0.6-1.3  

 

 ANION GAP (test code=GANG)  9.0 mmol/L    

 

 HGB (test code=MHB)  13.3 g/dL  12.0-17.0  

 

 HCT (test code=MHCT)  39.0 %  38.0-51.0  



CBC  (INCLUDES AUTOMATED DIFFERENTIAL) *2019 19:03:00





 Test Item  Value  Reference Range  Comments

 

 WBC (test code=WBC)  9.8 10\S\3/uL  4.5-11.0  

 

 RBC (test code=RBC)  5.08 10\S\6/uL  4.20-5.60  

 

 HGB (test code=HBG)  12.5 g/dL  12.0-15.5  

 

 HCT (test code=HCT)  40.1 %  35.0-44.0  

 

 MCV (test code=MCV)  78.9 fL  81.0-99.0  

 

 MCH (test code=MCH)  24.6 pg  27.0-31.0  

 

 MCHC (test code=MCHC)  31.2 g/dL  32.0-36.0  

 

 RDW (test code=RDW)  18.6 %  11.5-14.5  

 

 PLT (test code=PLT)  345 10\S\3/uL  130-400  

 

 MPV (test code=OMPV)  8.3 fL  6.2-10.2  

 

 NEUTROP # (test code=NE#)  6.4 10\S\3/uL  1.6-8.0  

 

 LYMPH # (test code=LY#)  2.4 10\S\3/uL  1.1-3.5  

 

 MID # (test code=GMID#)  1.0 10\S\3/uL  0.0-1.1  

 

 GRA  % (test code=GRA%)  65.3 %  35.0-73.0  

 

 LYMPH % (test code=GLY%)  24.9 %  20.0-55.0  

 

 MID % (test code=GMID%)  9.8 %  0.0-10.0  



XR PELVIS AP 1 VIEW *OW*2019-08-10 16:32:40DICTATION LOCATION: [id number]: 
Female, 43 years of age. [id number]: Falling injuryPROCEDURE:  PELVIS, ONE 
VIEW.RIGHT HIP, 2 VIEWS.COMPARISON:  None relevantCOMMENT:   Single lag screw 
is present fusingthe left SI joint. No evidencefor hardware loosening. No 
diastases that SI joints pubic symphysis. No acutefracture, dislocation, 
periosteal reaction, osteolytic or osteoblastic lesion. IMPRESSION: 1. No acute 
fracture or dislocation in the pelvis or right hip.2. Postoperative fusion at 
left SI joint.XR HIP RIGHT UNILATERAL 2 VIEWS *OW*2019-08-10 16:32:40DICTATION 
LOCATION: [id number]: Female, 43 years of age. [id number]: Falling 
injuryPROCEDURE:  PELVIS, ONE VIEW.RIGHT HIP, 2 VIEWS.COMPARISON:  None 
relevantCOMMENT:   Single lag screw is present fusingthe left SI joint. No 
evidencefor hardware loosening. No diastases that SI joints pubic symphysis. No 
acutefracture, dislocation, periosteal reaction, osteolytic or osteoblastic 
lesion. IMPRESSION: 1. No acute fracture or dislocation in the pelvis or right 
hip.2. Postoperative fusion at left SI joint.XR SHOULDER RIGHT 3VIEWS *OW*2019-
08-10 16:30:51DICTATION LOCATION: [id number]: Female, 43 years of age. [id number]
: Falling injuryEXAM: RIGHT SHOULDER, 3 VIEWS.COMPARISON: NoneFINDINGS:  No 
significant arthropathy or soft tissue swelling. There is noacute fracture or 
dislocation. No osteolytic or osteoblastic lesions. IMPRESSION:     No acute 
bony abnormalities.CT HEAD W/O CONTRAST *OW*2019-08-10 16:29:46DICTATION 
LOCATION: [id number]: Female, 43 years of age. [id number]: SyncopeEXAM:  CT 
BRAIN WITHOUT CONTRASTCOMPARISON: None.TECHNIQUE: Helical axial images were 
obtained through the brain without IVcontrast. Coronal and sagittal reformats 
were performed. One or more of thefollowing dose reduction techniques were used
: Automated exposure control,adjustment of the mA and/or kV according to 
patient size, and/or utilization ofiterative reconstruction technique.FINDINGS:
  No significant scalp soft tissueswelling or hematoma. There is noacute intra-
axial or extra-axial hemorrhage, mass, mass effect or midlineshift. No acute 
loss of gray/white junctions. No hydrocephalus.  There is noacute calvarial 
fracture. The sinuses and mastoids are clear. IMPRESSION:  No acute calvarial 
fracture, intracranial hemorrhage, infarct, ormass.BRAIN NATRIURETIC PROTEIN **
OW**2019-08-10 16:29:00





 Test Item  Value  Reference Range  Comments

 

 BNP (test code=OBNP)  64 pg/mL  0-50  



TROPONIN I i-STAT **OW**2019-08-10 16:14:00





 Test Item  Value  Reference Range  Comments

 

 TROPONIN I (test code=A84)  0.000 ng/mL  0.000-0.045  



DRUGS OF ABUSE*OW*2019-08-10 16:12:00





 Test Item  Value  Reference Range  Comments

 

 DRUG SCRN (test code=HDOA)  ****URINE DRUG SCREEN***    



   ***This is an unconfirmed    



   screening result and should not    



   be used for non-medical    



   purposes***    

 

 PHENCYCLID (test code=GPCP)  Negative  NEGATIVE  

 

 BENZODIAZE (test code=GBZO)  Negative  NEGATIVE  

 

 COCAINE (test code=GCOC)  Negative  NEGATIVE  

 

 AMPHETAMIN (test code=GAMP)  Negative  NEGATIVE  

 

 THC (test code=GTHC)  Negative  NEGATIVE  

 

 OPIATES (test code=FERNANDA)  Positive  NEGATIVE  

 

 BARBITURAT (test code=GBAR)  Negative  NEGATIVE  

 

 TCA (test code=GTCA)  Negative  NEGATIVE  

 

 DOAH (test code=DOAH)  **************URINE DRUG SCREEN    



   CUT OFF VALUES****************    



       



                   Amphetamines    



   1000    ng/mL        Barbituates    



            300  ng/mL    



   Benzodiazepines  300     ng/mL    



        Cocaine               300    



   ng/mL  Opiates    



   300     ng/mL    



   Phencyclidine       25   ng/mL    



   THC                       50    



    ng/mL        Tricyclic    



       



                   Antidepressants    



    1000 ng/mL    



   ********************************    



   ********************************    



   *******    



PREGNANCY URINE **OW**2019-08-10 16:09:00





 Test Item  Value  Reference Range  Comments

 

 PREG UR (test code=PGU)  Negative  NEGATIVE  



CHEM8+ i-STAT **OW**2019-08-10 16:04:00





 Test Item  Value  Reference Range  Comments

 

 SODIUM (test code=PEDRO)  144 mmol/L  138-146  

 

 POTASSIUM (test code=KI)  3.8 mmol/L  3.5-4.9  

 

 CHLORIDE (test code=CLI)  109 mmol/L    

 

 CA IONIZED (test code=ICAI)  1.11 mmol/L  1.12-1.32  

 

 GLUCOSE (test code=GLUI)  88 mg/dL    

 

 TCO2 (test code=TCO2)  24 mmol/L  24-29  

 

 BUN (test code=BUN1)  10 mg/dL  8-26  

 

 CREATININE (test code=CREAI)  1.0 mg/dL  0.6-1.3  

 

 ANION GAP (test code=GANG)  15.0 mmol/L    

 

 HGB (test code=MHB)  11.2 g/dL  12.0-17.0  

 

 HCT (test code=MHCT)  33.0 %  38.0-51.0  



URINALYSIS W/O MICROSCOPIC**OW**2019-08-10 16:03:00





 Test Item  Value  Reference Range  Comments

 

 COLOR (test code=COLU)  Yellow  YELLOW  

 

 CLARITY (test code=CLA)  Cloudy  CLEAR  

 

 GLUCOSE UR (test code=UA  Negative  NEGATIVE  



 GLUCOSE)      

 

 BILI UR (test code=BILE)  1+  NEGATIVE  

 

 KETONES UR (test code=ACE)  Trace  NEGATIVE  

 

 SP GRAVITY (test code=SPGR)  1.025  1.005-1.030  

 

 PH UR (test code=PH)  6.0  4.5-8.0  

 

 PROTEIN UR (test code=PU)  1+  NEGATIVE  

 

 NITRITE UR (test  Negative  NEGATIVE  



 code=NITRITE)      

 

 UROBIL UR (test code=GUROQ)  4.0 E.U./dL    

 

 UROBIL UR (test code=GUROQC)  ***** UROBILINOGEN REFERENCE    



   RANGE *****    



                0.2 - 1.0 EU/dL    

 

 BLOOD UR (test code=UA  2+  NEGATIVE  



 BLOOD)      

 

 LEUK ES UR (test code=LEUK)  Trace  NEGATIVE  



CBC  (INCLUDES AUTOMATED DIFFERENTIAL) *2019-08-10 15:56:00





 Test Item  Value  Reference Range  Comments

 

 WBC (test code=WBC)  8.7 10\S\3/uL  4.5-11.0  

 

 RBC (test code=RBC)  4.38 10\S\6/uL  4.20-5.60  

 

 HGB (test code=HBG)  10.4 g/dL  12.0-15.5  

 

 HCT (test code=HCT)  35.3 %  35.0-44.0  

 

 MCV (test code=MCV)  80.6 fL  81.0-99.0  

 

 MCH (test code=MCH)  23.7 pg  27.0-31.0  

 

 MCHC (test code=MCHC)  29.5 g/dL  32.0-36.0  

 

 RDW (test code=RDW)  19.0 %  11.5-14.5  

 

 PLT (test code=PLT)  380 10\S\3/uL  130-400  

 

 MPV (test code=OMPV)  8.1 fL  6.2-10.2  

 

 NEUTROP # (test code=NE#)  6.1 10\S\3/uL  1.6-8.0  

 

 LYMPH # (test code=LY#)  1.8 10\S\3/uL  1.1-3.5  

 

 MID # (test code=GMID#)  0.8 10\S\3/uL  0.0-1.1  

 

 GRA  % (test code=GRA%)  69.7 %  35.0-73.0  

 

 LYMPH % (test code=GLY%)  20.6 %  20.0-55.0  

 

 MID % (test code=GMID%)  9.7 %  0.0-10.0  



PROTHROMBIN TIME i-STAT **OW**2019-08-10 15:53:00





 Test Item  Value  Reference Range  Comments

 

 PT (test code=PT1)  <10.0 s  10.0-13.0  

 

 INR (test code=INR)  <0.9    

 

 INRH (test code=INRH)       **** SUGGESTED THERAPEUTIC RANGE    



   FOR INR: ****    2.5 - 3.5 **  For    



   Patients with Prosthetic Valves or    



   Patients with    



   recurrent Thromboembolic Events **    



   2.0 - 3.0 ** For Most Other    



   Applications **    



CBC  (INCLUDES AUTOMATED DIFFERENTIAL) *2019 18:46:00





 Test Item  Value  Reference Range  Comments

 

 WBC (test code=WBC)  10.7 10\S\3/uL  4.5-11.0  

 

 RBC (test code=RBC)  4.30 10\S\6/uL  4.20-5.60  

 

 HGB (test code=HBG)  10.3 g/dL  12.0-15.5  

 

 HCT (test code=HCT)  34.4 %  35.0-44.0  

 

 MCV (test code=MCV)  79.9 fL  81.0-99.0  

 

 MCH (test code=MCH)  24.0 pg  27.0-31.0  

 

 MCHC (test code=MCHC)  29.9 g/dL  32.0-36.0  

 

 RDW (test code=RDW)  17.8 %  11.5-14.5  

 

 PLT (test code=PLT)  425 10\S\3/uL  130-400  

 

 MPV (test code=OMPV)  8.1 fL  6.2-10.2  

 

 NEUTROP # (test code=NE#)  7.6 10\S\3/uL  1.6-8.0  

 

 LYMPH # (test code=LY#)  2.0 10\S\3/uL  1.1-3.5  

 

 MID # (test code=GMID#)  1.1 10\S\3/uL  0.0-1.1  

 

 GRA  % (test code=GRA%)  70.6 %  35.0-73.0  

 

 LYMPH % (test code=GLY%)  19.1 %  20.0-55.0  

 

 MID % (test code=GMID%)  10.3 %  0.0-10.0  



CHEM8+ i-STAT **OW**2019 18:45:00





 Test Item  Value  Reference Range  Comments

 

 SODIUM (test code=PEDRO)  142 mmol/L  138-146  

 

 POTASSIUM (test code=KI)  3.9 mmol/L  3.5-4.9  

 

 CHLORIDE (test code=CLI)  106 mmol/L    

 

 CA IONIZED (test code=ICAI)  1.11 mmol/L  1.12-1.32  

 

 GLUCOSE (test code=GLUI)  81 mg/dL    

 

 TCO2 (test code=TCO2)  26 mmol/L  24-29  

 

 BUN (test code=BUN1)  11 mg/dL  8-26  

 

 CREATININE (test code=CREAI)  0.8 mg/dL  0.6-1.3  

 

 ANION GAP (test code=GANG)  14.0 mmol/L    

 

 HGB (test code=MHB)  11.6 g/dL  12.0-17.0  

 

 HCT (test code=MHCT)  34.0 %  38.0-51.0  



TROPONIN I i-STAT **OW**2019 18:42:00





 Test Item  Value  Reference Range  Comments

 

 TROPONIN I (test code=A84)  0.000 ng/mL  0.000-0.045  



XR CHEST 1 VIEW *OW*2019 18:34:10LOCATION: V20 EXAM: XR CHEST 1 VIEW *OW*
HISTORY: [id number]: Chest pain TECHNIQUE: Frontal  view of the chest. COMPARISON
: 2019FINDINGS:The lungs are adequately inflated and clear. No evidence of 
pneumothorax orpleural effusion. Normal heart size. Mediastinal contours are 
within normal limits. Osseous structures are intact. IMPRESSION:No acute 
cardiopulmonary disease.COMPREHENSIVE METABOLIC PAN *WW*2019 16:50:00





 Test Item  Value  Reference Range  Comments

 

 GLUCOSE (test code=06D)  87 mg/dL    

 

 SODIUM (test code=01A)  139 mmol/L  136-145  

 

 POTASSIUM (test code=01B)  4.6 mmol/L  3.6-5.1  

 

 CHLORIDE (test code=04A)  106 mmol/L    

 

 CO2 (test code=02A)  27 mmol/L  22-32  

 

 ANION GAP (test code=ANG)  10.6 mmol/L    

 

 BUN (test code=05D)  12 mg/dL  7-18  

 

 CREATININE (test code=03E)  0.9 mg/dL  0.4-1.1  

 

 BUN/CREA (test code=BCR)  14  12-20  

 

 CALCIUM (test code=09D)  8.3 mg/dL  8.3-9.5  

 

 BILI TOTAL (test code=11A)  0.3 mg/dL  0.2-1.0  

 

 PROTEIN (test code=07D)  7.0 g/dL  6.4-8.2  

 

 ALBUMIN (test code=08D)  3.3 g/dL  3.5-4.8  

 

 GLOBULIN (test code=GLB)  3.6 g/dL  1.5-3.8  

 

 ALB/GLOB (test code=AGRR)  0.9  1.0-2.6  

 

 ALK PHOS (test code=35A)  108 IU/L    

 

 AST (test code=30A)  29 IU/L  <=42  

 

 ALT (test code=31A)  28 IU/L  <=78  



URINALYSIS WITH MICRO *WW*2019 16:47:00





 Test Item  Value  Reference Range  Comments

 

 COLOR (test code=COLU)  YELLOW  YELLOW  

 

 CLARITY (test code=CLA)  SLT HAZY  CLEAR  

 

 GLUCOSE UR (test code=UA GLUCOSE)  NEGATIVE  NEGATIVE  

 

 BILI UR (test code=BILE)  NEGATIVE  NEGATIVE  

 

 KETONES UR (test code=ACE)  NEGATIVE  NEGATIVE  

 

 SP GRAVITY (test code=SPGR)  1.010  1.005-1.030  

 

 PH UR (test code=PH)  7.0  4.5-8.0  

 

 PROTEIN UR (test code=PU)  NEGATIVE  NEGATIVE  

 

 UROBIL UR (test code=UROQ)  0.2 EU/dL  0.2-1.0  

 

 NITRITE UR (test code=NITRITE)  POSITIVE  NEGATIVE  

 

 BLOOD UR (test code=UA BLOOD)  NEGATIVE  NEGATIVE  

 

 LEUK ES UR (test code=LEUK)  TRACE  NEGATIVE  

 

 WBC UR (test code=UWBC)  6 /HPF  0-5  

 

 RBC UR (test code=URBC)  1 /HPF  0-2  

 

 EPITH  UR (test code=UEPC)  FEW /LPF  FEW  

 

 BACTERIA UR (test code=UBACT)  FEW /HPF  NONE  

 

 CAST UR (test code=CAST)   /LPF  NONE  

 

 CRYSTAL UR (test code=CRYU)   / LPF  NONE  

 

 MUCUS UR (test code=MUC)   / HPF  NONE  

 

 AMORPH UR (test code=SD)   / HPF  NONE  

 

 TRICH UR (test code=UTRICH)   /HPF  NONE  

 

 YEAST UR (test code=UY)   /HPF  NONE  

 

 SPERM UR (test code=USPERM)   /HPF  NONE  



CBC (INCLUDES AUTOMATED DIFFERENTIAL)*[id number] 16:45:00





 Test Item  Value  Reference Range  Comments

 

 WBC (test code=WBC)  10.5 10\S\3/uL  4.5-11.0  

 

 RBC (test code=RBC)  4.36 10\S\6/uL  4.20-5.60  

 

 HGB (test code=HBG)  10.8 g/dL  12.0-15.5  

 

 HCT (test code=HCT)  36.4 %  35.0-44.0  

 

 MCV (test code=MCV)  83.5 fL  81.0-99.0  

 

 MCH (test code=MCH)  24.8 pg  27.0-31.0  

 

 MCHC (test code=MCHC)  29.7 g/dL  32.0-36.0  

 

 RDW (test code=RDW)  19.7 %  11.5-14.5  

 

 PLT (test code=PLT)  419 10\S\3/uL  130-400  

 

 MPV (test code=MPV)  10.1 fL  9.4-12.4  

 

 NEUTROP # (test code=NE#)  7.2 10\S\3/uL  1.6-8.0  

 

 LYMPH # (test code=LY#)  1.9 10\S\3/uL  1.1-3.5  

 

 MONOCYTE # (test code=MO#)  0.9 10\S\3/uL  0.0-1.1  

 

 EOSINOPH # (test code=EO#)  0.3 10\S\3/uL  0.0-0.7  

 

 BASOPHIL # (test code=BA#)  0.1 10\S\3/uL  0.0-0.3  

 

 IG # (test code=IG#)  0.05 10\S\3/uL  0.00-0.06  

 

 NRBC # (test code=NRBC#)  0.00 10\S\3/uL  0.00-0.01  

 

 NEUTROPH % (test code=NE%)  68.9 %  35.0-73.0  

 

 LYMPH % (test code=LY%)  18.5 %  20.0-55.0  

 

 MONO % (test code=MO%)  8.6 %  2.5-10.0  

 

 EOSINOPH % (test code=EO%)  2.9 %  0.0-5.0  

 

 BASOPHIL % (test code=BA%)  0.6 %  0.0-2.0  

 

 IG % (test code=IG%)  0.5 %  0.0-0.8  

 

 NRBC% (test code=NRBC%)  0.0 %  0.0-0.2  

 

 MANDIFF (test code=WMDIFF)  NO  NO  

 

 RBC MORPH (test code=WRBCMOR)    NORMAL  



PREGNANCY URINE MONOCLONAL  *WW*2019 16:43:00





 Test Item  Value  Reference Range  Comments

 

 PREG UR (test code=PGU)  NEGATIVE  NEGATIVE  



TROPONIN I i-STAT **OW**2019 19:40:00





 Test Item  Value  Reference Range  Comments

 

 TROPONIN I (test code=A84)  0.000 ng/mL  0.000-0.045  



XR CHEST 1 VIEW PORTABLE *OW*2019 19:22:52Exam: Chest portable 
erectLocation: H 12History: [id number]: Chest painComparison: 2019Findings:
The lungs are clear. No infiltrate or effusion is seen. The 
pulmonaryvasculature is normal. The heartsize is normal. The mediastinal 
silhouette isunremarkable. The bony thorax is intact.Impression:No acute 
disease.CHEM8+ i-STAT **OW**2019 19:21:00





 Test Item  Value  Reference Range  Comments

 

 SODIUM (test code=PEDRO)  143 mmol/L  138-146  

 

 POTASSIUM (test code=KI)  3.7 mmol/L  3.5-4.9  

 

 CHLORIDE (test code=CLI)  106 mmol/L    

 

 CA IONIZED (test code=ICAI)  1.13 mmol/L  1.12-1.32  

 

 GLUCOSE (test code=GLUI)  111 mg/dL    

 

 TCO2 (test code=TCO2)  29 mmol/L  24-29  

 

 BUN (test code=BUN1)  13 mg/dL  8-26  

 

 CREATININE (test code=CREAI)  0.9 mg/dL  0.6-1.3  

 

 ANION GAP (test code=GANG)  12.0 mmol/L    

 

 HGB (test code=MHB)  11.6 g/dL  12.0-17.0  

 

 HCT (test code=MHCT)  34.0 %  38.0-51.0  



CBC  (INCLUDES AUTOMATED DIFFERENTIAL) *2019 19:15:00





 Test Item  Value  Reference Range  Comments

 

 WBC (test code=WBC)  10.6 10\S\3/uL  4.5-11.0  

 

 RBC (test code=RBC)  4.69 10\S\6/uL  4.20-5.60  

 

 HGB (test code=HBG)  11.4 g/dL  12.0-15.5  

 

 HCT (test code=HCT)  37.4 %  35.0-44.0  

 

 MCV (test code=MCV)  79.8 fL  81.0-99.0  

 

 MCH (test code=MCH)  24.3 pg  27.0-31.0  

 

 MCHC (test code=MCHC)  30.5 g/dL  32.0-36.0  

 

 RDW (test code=RDW)  16.1 %  11.5-14.5  

 

 PLT (test code=PLT)  484 10\S\3/uL  130-400  

 

 MPV (test code=OMPV)  8.3 fL  6.2-10.2  

 

 NEUTROP # (test code=NE#)  7.4 10\S\3/uL  1.6-8.0  

 

 LYMPH # (test code=LY#)  2.4 10\S\3/uL  1.1-3.5  

 

 MID # (test code=GMID#)  0.9 10\S\3/uL  0.0-1.1  

 

 GRA  % (test code=GRA%)  69.6 %  35.0-73.0  

 

 LYMPH % (test code=GLY%)  22.3 %  20.0-55.0  

 

 MID % (test code=GMID%)  8.1 %  0.0-10.0  



BLOOD [id number] 08:03:00





 Test Item  Value  Reference Range  Comments

 

 Culture Observations (test code=COB1)  NO GROWTH AFTER 5 DAYS    



BLOOD [id number] 08:03:00





 Test Item  Value  Reference Range  Comments

 

 Culture Observations (test code=COB1)  NO GROWTH AFTER 5 DAYS    



CHEM8+ i-STAT **OW**2019 18:05:00





 Test Item  Value  Reference Range  Comments

 

 SODIUM (test code=PEDRO)  142 mmol/L  138-146  

 

 POTASSIUM (test code=KI)  3.9 mmol/L  3.5-4.9  

 

 CHLORIDE (test code=CLI)  108 mmol/L    

 

 CA IONIZED (test code=ICAI)  1.09 mmol/L  1.12-1.32  

 

 GLUCOSE (test code=GLUI)  70 mg/dL    

 

 TCO2 (test code=TCO2)  24 mmol/L  24-29  

 

 BUN (test code=BUN1)  8 mg/dL  8-26  

 

 CREATININE (test code=CREAI)  0.7 mg/dL  0.6-1.3  

 

 ANION GAP (test code=GANG)  15.0 mmol/L    

 

 HGB (test code=MHB)  9.5 g/dL  12.0-17.0  

 

 HCT (test code=MHCT)  28.0 %  38.0-51.0  



CT PE PROTOCOL *OW*2019 17:34:08CT chest with contrast, PE protocol CLINICAL HISTORY:  Chest painTECHNIQUE: Chest CT was performed with 
intravenous contrast utilizing a PEprotocol. 2-D and 3-D reformatted images 
were obtained. This exam was performedaccording to our departmental dose 
optimization program which includesautomated exposure control, adjustment of 
the mA and/or kV according topatient's size and/or use of iterative 
reconstructive technique.COMPARISON:  None availableFINDINGS:There is no 
pulmonary embolism. The heart and great vessels are normal in sizeand 
configuration. The lungs are clear. The tracheobronchialtree is intact.There is 
no axillary, mediastinal, or hilar lymphadenopathy. The visualizedlower neck, 
upper abdomen, and skeleton are without worrisome finding.IMPRESSION:
Unremarkable contrast enhancedexamination.DRUGS OF ABUSE*OW*2019 17:05:00





 Test Item  Value  Reference Range  Comments

 

 DRUG SCRN (test code=HDOA)  ****URINE DRUG SCREEN***    



   ***This is an unconfirmed    



   screening result and should not    



   be used for non-medical    



   purposes***    

 

 PHENCYCLID (test code=GPCP)  Negative  NEGATIVE  

 

 BENZODIAZE (test code=GBZO)  Negative  NEGATIVE  

 

 COCAINE (test code=GCOC)  Negative  NEGATIVE  

 

 AMPHETAMIN (test code=GAMP)  Negative  NEGATIVE  

 

 THC (test code=GTHC)  Negative  NEGATIVE  

 

 OPIATES (test code=FERNANDA)  Negative  NEGATIVE  

 

 BARBITURAT (test code=GBAR)  Negative  NEGATIVE  

 

 TCA (test code=GTCA)  Negative  NEGATIVE  

 

 DOAH (test code=DOAH)  **************URINE DRUG SCREEN    



   CUT OFF VALUES****************    



       



                   Amphetamines    



   1000    ng/mL        Barbituates    



            300  ng/mL    



   Benzodiazepines  300     ng/mL    



        Cocaine               300    



   ng/mL  Opiates    



   300     ng/mL    



   Phencyclidine       25   ng/mL    



   THC                       50    



    ng/mL        Tricyclic    



       



                   Antidepressants    



    1000 ng/mL    



   ********************************    



   ********************************    



   *******    



URINALYSIS W/O MICROSCOPIC**OW**2019 16:56:00





 Test Item  Value  Reference Range  Comments

 

 COLOR (test code=COLU)  Yellow  YELLOW  

 

 CLARITY (test code=CLA)  Clear  CLEAR  

 

 GLUCOSE UR (test code=UA  Negative  NEGATIVE  



 GLUCOSE)      

 

 BILI UR (test code=BILE)  Negative  NEGATIVE  

 

 KETONES UR (test code=ACE)  Trace  NEGATIVE  

 

 SP GRAVITY (test code=SPGR)  1.025  1.005-1.030  

 

 PH UR (test code=PH)  5.5  4.5-8.0  

 

 PROTEIN UR (test code=PU)  Trace  NEGATIVE  

 

 NITRITE UR (test  Negative  NEGATIVE  



 code=NITRITE)      

 

 UROBIL UR (test code=GUROQ)  0.2 E.U./dL    

 

 UROBIL UR (test code=GUROQC)  ***** UROBILINOGEN REFERENCE    



   RANGE *****    



                0.2 - 1.0 EU/dL    

 

 BLOOD UR (test code=UA  Negative  NEGATIVE  



 BLOOD)      

 

 LEUK ES UR (test code=LEUK)  Trace  NEGATIVE  



LACTIC ACID **OW**2019 16:39:00





 Test Item  Value  Reference Range  Comments

 

 LACTATE (test code=KATINA)  1.7 mmol/L  0.9-1.7  



BRAIN NATRIURETIC PROTEIN **OW**2019 16:37:00





 Test Item  Value  Reference Range  Comments

 

 BNP (test code=OBNP)  48 pg/mL  0-50  



TROPONIN I i-STAT **OW**2019 16:26:00





 Test Item  Value  Reference Range  Comments

 

 TROPONIN I (test code=A84)  0.000 ng/mL  0.000-0.045  



CHEM8+ i-STAT **OW**2019 16:15:00





 Test Item  Value  Reference Range  Comments

 

 SODIUM (test code=PEDRO)  143 mmol/L  138-146  

 

 POTASSIUM (test code=KI)  2.8 mmol/L  3.5-4.9  

 

 CHLORIDE (test code=CLI)  108 mmol/L    

 

 CA IONIZED (test code=ICAI)  1.15 mmol/L  1.12-1.32  

 

 GLUCOSE (test code=GLUI)  203 mg/dL    

 

 TCO2 (test code=TCO2)  23 mmol/L  24-29  

 

 BUN (test code=BUN1)  8 mg/dL  8-26  

 

 CREATININE (test code=CREAI)  0.8 mg/dL  0.6-1.3  

 

 ANION GAP (test code=GANG)  16.0 mmol/L    

 

 HGB (test code=MHB)  10.9 g/dL  12.0-17.0  

 

 HCT (test code=MHCT)  32.0 %  38.0-51.0  



LACTIC ACID **OW**2019 16:13:00





 Test Item  Value  Reference Range  Comments

 

 LACTATE (test code=KATINA)  3.6 mmol/L  0.9-1.7  



CBC  (INCLUDES AUTOMATED DIFFERENTIAL) *2019 16:10:00





 Test Item  Value  Reference Range  Comments

 

 WBC (test code=WBC)  12.8 10\S\3/uL  4.5-11.0  

 

 RBC (test code=RBC)  4.41 10\S\6/uL  4.20-5.60  

 

 HGB (test code=HBG)  10.5 g/dL  12.0-15.5  

 

 HCT (test code=HCT)  34.5 %  35.0-44.0  

 

 MCV (test code=MCV)  78.2 fL  81.0-99.0  

 

 MCH (test code=MCH)  23.8 pg  27.0-31.0  

 

 MCHC (test code=MCHC)  30.4 g/dL  32.0-36.0  

 

 RDW (test code=RDW)  15.7 %  11.5-14.5  

 

 PLT (test code=PLT)  434 10\S\3/uL  130-400  

 

 MPV (test code=OMPV)  8.6 fL  6.2-10.2  

 

 NEUTROP # (test code=NE#)  9.9 10\S\3/uL  1.6-8.0  

 

 LYMPH # (test code=LY#)  2.1 10\S\3/uL  1.1-3.5  

 

 MID # (test code=GMID#)  0.8 10\S\3/uL  0.0-1.1  

 

 GRA  % (test code=GRA%)  77.2 %  35.0-73.0  

 

 LYMPH % (test code=GLY%)  16.2 %  20.0-55.0  

 

 MID % (test code=GMID%)  6.6 %  0.0-10.0  



XR CHEST 1 VIEW PORTABLE *OW*2019 16:04:43Portable AP chest, 1 
viewLocation Code: [id number] HISTORY: Chest painCOMPARISON: 2019COMMENT: 
The lungs are clear and well inflated. The costophrenic angles are sharp. 
Thecardiomediastinal silhouette is unremarkable. The bones are intact.IMPRESSION
: Stable chest with no acute abnormalityPROTHROMBIN TIME i-STAT **OW** 16:00:00





 Test Item  Value  Reference Range  Comments

 

 PT (test code=PT1)  12.3 s  10.0-13.0  

 

 INR (test code=INR)  1.0    

 

 INRH (test code=INRH)       **** SUGGESTED THERAPEUTIC RANGE    



   FOR INR: ****    2.5 - 3.5 **  For    



   Patients with Prosthetic Valves or    



   Patients with    



   recurrent Thromboembolic Events **    



   2.0 - 3.0 ** For Most Other    



   Applications **    



GLUCOMETER GLUCOSE-  LAB USE [id number] 06:02:00





 Test Item  Value  Reference Range  Comments

 

 GLUCOMETER (test code=GMG)  95 mg/dL    



CARDIAC [id number] 12:16:00





 Test Item  Value  Reference Range  Comments

 

 TROPONIN I (test code=A84)  <0.015 ng/mL  0.000-0.045  



MRA NECK W/O [id number] 10:23:21Exam: MRA of the neck without 
contrastHISTORY: TIA/strokeTECHNIQUE: 3-D time of flight MR angiography of the 
neck was performed withoutcontrast.FINDINGS:1. The common, internal, and 
external carotid arteries have a normalappearance. There is no evidence of 
significant atheromatous plaque orstenosis.2.Normal appearing carotid bulbs 
bilaterally.3. Normal antegrade flow in both vertebral arteries.4. Noevidence 
of ulceration.IMPRESSION:1. Unremarkable examination. No evidence of 
significant stenosis.MRA HEAD W/O [id number] 10:22:35MRA brain without 
contrastLocation code: X0Oxlibmsb history: StrokeTechnique: 3-D time-of-flight 
MR angiography of the intracranial circulationwas performed without contrast. 
Volume rendered 3-D reformatted images of thevessels were obtained from source 
data. Findings:The terminal portions of the internal carotid arteries bifurcate 
into normalanterior and middle cerebral arteries. The anterior communicating 
artery ispatent. The basilar artery is normal in caliber and contour and 
terminates innormal-appearing posterior cerebral arteries. The posterior 
communicatingarteries are patent bilaterally. There is no focal stenosis, major 
branchocclusion, aneurysm, or vascular malformation.Impression: No acute 
abnormality.MRI BRAIN W/O [id number] 10:21:44MRI brain without 
contrastLocation code: P6Nagduwpo history: Stroke, TIAComparison: NoneTechnique
: Multiplanar multisequence MR imaging of the brain was performedwithout 
contrast. Findings: There is noarea of restricted diffusion to suggest acute or 
recentinfarct. There is no acute intracranial hemorrhage or extra-axial 
collection. There is mild generalized volume loss. Mild increased T2/FLAIR 
signal withinthe periventricular white matter is noted compatible with chronic 
small vesseldisease. There is no space-occupying mass, or lesion. There is 
nohydrocephalus, intracranial edema, or midline shift. The posterior fossa and 
internal auditory canals are unremarkable. Note made ofmoderate chronic mucosal 
thickening within the ethmoid sinuses bilaterally.Impression: Mild deep white 
matter chronic small vessel disease with otherwise no acuteintracranial 
[id number] 02:38:00





 Test Item  Value  Reference Range  Comments

 

 Hb A1C % (test code=HBA)  4.7 %  4.2-6.3  



CARDIAC [id number] 02:34:00





 Test Item  Value  Reference Range  Comments

 

 TROPONIN I (test code=A84)  <0.015 ng/mL  0.000-0.045  



LIPID [id number] 02:32:00





 Test Item  Value  Reference Range  Comments

 

 CHOLESTROL (test code=44A)  175 mg/dL  140-200  

 

 TRIGLYCERI (test code=42B)  84 mg/dL  <=149  

 

 HDL (test code=83D)  55.0 mg/dL  40.0-60.0  

 

 LDL (test code=34B)  110 mg/dL  <=99  

 

 CHL/HDL (test code=CHR)  3.2  0.0-3.4  



PRO TIME AND [id number] 02:31:00





 Test Item  Value  Reference Range  Comments

 

 PT (test code=TT)  11.9 s  9.8-13.6  

 

 INR (test code=INR)  1.0    

 

 INRH (test code=INRH)       **** SUGGESTED THERAPEUTIC RANGE    



   FOR INR: ****    2.5 - 3.5 **  For    



   Patients with Prosthetic Valves or    



   Patients with    



   recurrent Thromboembolic Events **    



   2.0 - 3.0 ** For Most Other    



   Applications **    

 

 PTT (test code=PTT)  27.3 s  20.2-38.0  

 

 PTTH (test code=PTTH)  **** To monitor the effectiveness of    



   heparin, we offer the Anti-Xa    



   (Heparin Assay). It can be used for    



   either unfractionated or LMW  Heparin.    



   Order Code is ANTI-XA ****    



CBC (INCLUDES AUTOMATED DIFFERENTIAL)2019 02:22:00





 Test Item  Value  Reference Range  Comments

 

 WBC (test code=WBC)  8.1 10\S\3/uL  4.5-11.0  

 

 RBC (test code=RBC)  3.36 10\S\6/uL  4.20-5.60  

 

 HGB (test code=HBG)  9.0 g/dL  12.0-15.5  

 

 HCT (test code=HCT)  29.4 %  35.0-44.0  

 

 MCV (test code=MCV)  87.5 fL  81.0-99.0  

 

 MCH (test code=MCH)  26.8 pg  27.0-31.0  

 

 MCHC (test code=MCHC)  30.6 g/dL  32.0-36.0  

 

 RDW (test code=RDW)  14.6 %  11.5-14.5  

 

 PLT (test code=PLT)  264 10\S\3/uL  130-400  

 

 MPV (test code=MPV)  10.8 fL  9.4-12.4  

 

 NEUTROP # (test code=NE#)  5.0 10\S\3/uL  1.6-8.0  

 

 LYMPH # (test code=LY#)  2.0 10\S\3/uL  1.1-3.5  

 

 MONOCYTE # (test code=MO#)  0.8 10\S\3/uL  0.0-1.1  

 

 EOSINOPH # (test code=EO#)  0.3 10\S\3/uL  0.0-0.7  

 

 BASOPHIL # (test code=BA#)  0.0 10\S\3/uL  0.0-0.3  

 

 IG # (test code=IG#)  0.02 10\S\3/uL  0.00-0.06  

 

 NRBC # (test code=NRBC#)  0.00 10\S\3/uL  0.00-0.01  

 

 NEUTROPH % (test code=NE%)  61.1 %  35.0-73.0  

 

 LYMPH % (test code=LY%)  24.8 %  20.0-55.0  

 

 MONO % (test code=MO%)  10.3 %  2.5-10.0  

 

 EOSINOPH % (test code=EO%)  3.2 %  0.0-5.0  

 

 BASOPHIL % (test code=BA%)  0.4 %  0.0-2.0  

 

 IG % (test code=IG%)  0.2 %  0.0-0.8  

 

 NRBC% (test code=NRBC%)  0.0 %  0.0-0.2  

 

 MANDIFF (test code=MDIFF)  NO  NO  

 

 RBC MORPH (test code=RBCMOR)    NORMAL  



XR CHEST 1 VIEW PORTABLE *OW*2019 19:11:47LOCATION: V20 EXAM: XR CHEST 1 
VIEW PORTABLE *OW*HISTORY: [id number]: Cerebrovascular accident TECHNIQUE: 
Frontal view of the chest.COMPARISON: None.FINDINGS: The lungs are hypoinflated
, limiting their assessment but appear clear.No evidence of pneumothorax or 
pleural effusion. The heart is normal in size.  The mediastinal contours are 
unremarkable.   Osseous structures are intact. IMPRESSION:No evidence of acute 
cardiopulmonary disease.BRAIN NATRIURETIC PROTEIN **OW**2019 19:10:00





 Test Item  Value  Reference Range  Comments

 

 BNP (test code=OBNP)  <15 pg/mL  0-50  



CHEM8+ i-STAT **OW**2019 19:09:00





 Test Item  Value  Reference Range  Comments

 

 SODIUM (test code=PEDRO)  139 mmol/L  138-146  

 

 POTASSIUM (test code=KI)  5.2 mmol/L  3.5-4.9  

 

 CHLORIDE (test code=CLI)  107 mmol/L    

 

 CA IONIZED (test code=ICAI)  1.10 mmol/L  1.12-1.32  

 

 GLUCOSE (test code=GLUI)  84 mg/dL    

 

 TCO2 (test code=TCO2)  27 mmol/L  24-29  

 

 BUN (test code=BUN1)  10 mg/dL  8-26  

 

 CREATININE (test code=CREAI)  0.9 mg/dL  0.6-1.3  

 

 ANION GAP (test code=GANG)  11.0 mmol/L    

 

 HGB (test code=MHB)  10.5 g/dL  12.0-17.0  

 

 HCT (test code=MHCT)  31.0 %  38.0-51.0  



TROPONIN I i-STAT **OW**2019 18:46:00





 Test Item  Value  Reference Range  Comments

 

 TROPONIN I (test code=A84)  0.000 ng/mL  0.000-0.045  



CBC  (INCLUDES AUTOMATED DIFFERENTIAL) *2019 18:32:00





 Test Item  Value  Reference Range  Comments

 

 WBC (test code=WBC)  9.6 10\S\3/uL  4.5-11.0  

 

 RBC (test code=RBC)  4.63 10\S\6/uL  4.20-5.60  

 

 HGB (test code=HBG)  11.4 g/dL  12.0-15.5  

 

 HCT (test code=HCT)  37.7 %  35.0-44.0  

 

 MCV (test code=MCV)  81.5 fL  81.0-99.0  

 

 MCH (test code=MCH)  24.6 pg  27.0-31.0  

 

 MCHC (test code=MCHC)  30.2 g/dL  32.0-36.0  

 

 RDW (test code=RDW)  14.6 %  11.5-14.5  

 

 PLT (test code=PLT)  273 10\S\3/uL  130-400  

 

 MPV (test code=OMPV)  8.9 fL  6.2-10.2  

 

 NEUTROP # (test code=NE#)  6.5 10\S\3/uL  1.6-8.0  

 

 LYMPH # (test code=LY#)  2.1 10\S\3/uL  1.1-3.5  

 

 MID # (test code=GMID#)  1.0 10\S\3/uL  0.0-1.1  

 

 GRA  % (test code=GRA%)  68.2 %  35.0-73.0  

 

 LYMPH % (test code=GLY%)  21.5 %  20.0-55.0  

 

 MID % (test code=GMID%)  10.3 %  0.0-10.0  



CT HEAD W/O CONTRAST *OW*2019 18:21:01LOCATION: [id number]:  43-year-old 
female who presents with weakness. Clinical concern forstroke.COMMENT:    Axial 
imaging of the patient's brain was obtained without IV contrast. Softtissue and 
bone window images were provided. Coronal and sagittalreconstructions were 
included.An older examination of10/21/18 is available for comparison.The 
current study was obtained within 24 hours of the patient'sarrival to 
[facility].One or more of the following dose reduction techniques were used: 
Automatedexposure control, adjustment of the mA and/or kV according to patient 
size,and/or utilization of iterative reconstruction technique.DLP: 989.79 mGy-
cmCONTRAST: None.FINDINGS:There is no evidence of acutemass effect, midline 
shift, hemorrhage, orherniation. The ventricles, sulci, and cisterns are 
withinnormal limits. Thereis no CT evidence of an acute infarct.The skeleton is 
intact. The visualized paranasal sinuses and air cells areclear. The soft 
tissues are unremarkable.IMPRESSION: Unremarkable noncontrast head CT 
examination.CHEM8+ i-STAT **OW**2019 21:27:00





 Test Item  Value  Reference Range  Comments

 

 SODIUM (test code=PEDRO)  139 mmol/L  138-146  

 

 POTASSIUM (test code=KI)  4.2 mmol/L  3.5-4.9  

 

 CHLORIDE (test code=CLI)  109 mmol/L    

 

 CA IONIZED (test code=ICAI)  1.08 mmol/L  1.12-1.32  

 

 GLUCOSE (test code=GLUI)  115 mg/dL    

 

 TCO2 (test code=TCO2)  21 mmol/L  24-29  

 

 BUN (test code=BUN1)  8 mg/dL  8-26  

 

 CREATININE (test code=CREAI)  0.7 mg/dL  0.6-1.3  

 

 ANION GAP (test code=GANG)  15.0 mmol/L    

 

 HGB (test code=MHB)  10.5 g/dL  12.0-17.0  

 

 HCT (test code=MHCT)  31.0 %  38.0-51.0  



CBC  (INCLUDES AUTOMATED DIFFERENTIAL) *2019 21:25:00





 Test Item  Value  Reference Range  Comments

 

 WBC (test code=WBC)  11.4 10\S\3/uL  4.5-11.0  

 

 RBC (test code=RBC)  3.94 10\S\6/uL  4.20-5.60  

 

 HGB (test code=HBG)  10.4 g/dL  12.0-15.5  

 

 HCT (test code=HCT)  33.8 %  35.0-44.0  

 

 MCV (test code=MCV)  85.7 fL  81.0-99.0  

 

 MCH (test code=MCH)  26.4 pg  27.0-31.0  

 

 MCHC (test code=MCHC)  30.8 g/dL  32.0-36.0  

 

 RDW (test code=RDW)  14.7 %  11.5-14.5  

 

 PLT (test code=PLT)  373 10\S\3/uL  130-400  

 

 MPV (test code=OMPV)  8.0 fL  6.2-10.2  

 

 NEUTROP # (test code=NE#)  8.0 10\S\3/uL  1.6-8.0  

 

 LYMPH # (test code=LY#)  2.2 10\S\3/uL  1.1-3.5  

 

 MID # (test code=GMID#)  1.2 10\S\3/uL  0.0-1.1  

 

 GRA  % (test code=GRA%)  70.1 %  35.0-73.0  

 

 LYMPH % (test code=GLY%)  19.7 %  20.0-55.0  

 

 MID % (test code=GMID%)  10.2 %  0.0-10.0  



PROTHROMBIN TIME i-STAT **OW**2019 21:23:00





 Test Item  Value  Reference Range  Comments

 

 PT (test code=PT1)  13.2 s  10.0-13.0  

 

 INR (test code=INR)  1.1    

 

 INRH (test code=INRH)       **** SUGGESTED THERAPEUTIC RANGE    



   FOR INR: ****    2.5 - 3.5 **  For    



   Patients with Prosthetic Valves or    



   Patients with    



   recurrent Thromboembolic Events **    



   2.0 - 3.0 ** For Most Other    



   Applications **    



U/S VENOUS DOPPLER LT LOWER EXT *OW*2019 21:18:40Exam: Left lower 
extremity venous Doppler.Location: H1Buhzfar: : Unspecified [id number]
: Localized swelling, mass and lump, lower limbTechnique: Grayscale, color flow 
and spectral analysis ofthe common femoral,profunda femoris, femoral, popliteal
, saphenous and calf veins of the leftlower extremity was performed.Findings:
There is normal flow, phasicity, compressibility and augmentation of the 
deepvenous system of the left lower extremity. No DVT is found in the left 
leg.Impression:Negative for DVT.XR KNEE RIGHT 3 VIEWS *OW*2019 19:21:
19LOCATION: [id number]: 43-year-old female who suffered a fall.COMMENT:
Radiographs this patient's leftfoot, left ankle, and right knee were 
obtained.FOOT FINDINGS:Frontal, oblique, and lateral projections were 
included.The skeleton is intact. The joint spaces and soft tissues are 
unremarkable.ANKLE FINDINGS:Frontal, oblique, and lateral projections were 
included.The skeleton is intact. The joint spaces and soft tissues are 
unremarkable.KNEE FINDINGS:Frontal, oblique, and lateral projections were 
included.The skeleton is intact. The joint spaces and soft tissues are 
unremarkable.IMPRESSION:Unremarkable radiographic examinations of this patient'
s left foot and leftankle, and right knee.XR ANKLE LEFT COMPLETE 3 VIEWS *OW*
2019 19:21:19LOCATION: [id number]: 43-year-old female who suffered a 
fall.COMMENT:Radiographs this patient's leftfoot, left ankle, and right knee 
were obtained.FOOT FINDINGS:Frontal, oblique, and lateral projections were 
included.The skeleton is intact. The joint spaces and soft tissues are 
unremarkable.ANKLE FINDINGS:Frontal, oblique, and lateral projections were 
included.The skeleton is intact. The joint spaces and soft tissues are 
unremarkable.KNEE FINDINGS:Frontal, oblique, and lateral projections were 
included.The skeleton is intact. The joint spaces and soft tissues are 
unremarkable.IMPRESSION:Unremarkable radiographic examinations of this patient'
s left foot and leftankle, and right knee.XR FOOT LEFT COMPLETE 3 VIEWS *OW*2019 19:21:19LOCATION: [id number]: 43-year-old female who suffered a 
fall.COMMENT:Radiographs this patient's leftfoot, left ankle, and right knee 
were obtained.FOOT FINDINGS:Frontal, oblique, and lateral projections were 
included.The skeleton is intact. The joint spaces and soft tissues are 
unremarkable.ANKLE FINDINGS:Frontal, oblique, and lateral projections were 
included.The skeleton is intact. The joint spaces and soft tissues are 
unremarkable.KNEE FINDINGS:Frontal, oblique, and lateral projections were 
included.The skeleton is intact. The joint spaces and soft tissues are 
unremarkable.IMPRESSION:Unremarkable radiographic examinations of this patient'
s left foot and leftankle, and right knee.CT PELVIS W/O CONTRAST *OW*2019 
19:13:08EXAM: CT pelvis without contrastLocation: [id number]: Unspecified 
fallTECHNIQUE: Axial 1.25 mm images of the pelvis were obtained, with coronal 
andsagittal reformats.DISCUSSION:Osseous structures: There is been previous 
screw placement across the leftsacroiliac joint. There is a healed remote left 
inferior pubic ramus fracture.No acute fracture is identified. No bony mass 
lesion is seen. There is nosignificant hip or sacroiliac joint 
osteoarthrosis.Soft tissues: No contusion or hematoma is seen inthe field-of-
view, althoughportions of the hips are outside of the field-of-view of this 
exam. No herniais seen.IMPRESSION: 1. No acute bony abnormalities.2. Previous 
ORIF of the left sacroiliac joint.One or more of the following dose reduction 
techniques were used: Automatedexposure control, adjustment of the mA and/or kV 
according to patient size,and/or utilization of iterative reconstruction 
technique.DLP: 877 mGy-cm  CTDI 33 mGyCT HEAD W/O CONTRAST *OW*2018-10-21 20:36:
32EXAM: CT HEAD W/O CONTRAST *OW*HISTORY: R55: SYNCOPE AND COLLAPSETECHNIQUE: 
Axial tomograms through the brain were obtained without  intravenouscontrast.  
Coronal and sagittal reformatted images are provided.COMPARISON: None available 
time of interpretation.FINDINGS:There is no acute territorial ischemia.       
No intracranial hemorrhage orextra-axial collection is identified.   There is 
no mass or mass effect.   Theventricles and basal cisterns are age appropriate.
      The orbits are unremarkable.The visualized paranasal sinuses and 
tympanomastoid cavities are unremarkable.No acute fracture is present.IMPRESSION
:No acute territorial ischemia or hemorrhage.TROPONIN I i-STAT **OW**2018-10-21 
20:36:00





 Test Item  Value  Reference Range  Comments

 

 TROPONIN I (test code=A84)  0.000 ng/mL  0.000-0.045  



DRUGS OF ABUSE*OW*2018-10-21 20:34:00





 Test Item  Value  Reference Range  Comments

 

 DRUG SCRN (test code=HDOA)  ****URINE DRUG SCREEN***    



   ***This is an unconfirmed    



   screening result and should not    



   be used for non-medical    



   purposes***    

 

 PHENCYCLID (test code=GPCP)  Negative  NEGATIVE  

 

 BENZODIAZE (test code=GBZO)  Negative  NEGATIVE  

 

 COCAINE (test code=GCOC)  Negative  NEGATIVE  

 

 AMPHETAMIN (test code=GAMP)  Negative  NEGATIVE  

 

 THC (test code=GTHC)  Negative  NEGATIVE  

 

 OPIATES (test code=FERNANDA)  Negative  NEGATIVE  

 

 BARBITURAT (test code=GBAR)  Negative  NEGATIVE  

 

 TCA (test code=GTCA)  Negative  NEGATIVE  

 

 DOAH (test code=DOAH)  **************URINE DRUG CREEN    



   CUT OFF VALUES****************    



       



                   Amphetamines    



   1000    ng/mL        Barbituates    



            300  ng/mL    



   Benzodiazepines  300     ng/mL    



        Cocaine               300    



   ng/mL  Opiates    



   300     ng/mL    



   Phencyclidine       25   ng/mL    



   THC                       50    



    ng/mL        Tricyclic    



       



                   Antidepressants    



    1000 ng/mL    



   ********************************    



   ********************************    



   *******    



PROTHROMBIN TIME i-STAT **OW**2018-10-21 20:26:00





 Test Item  Value  Reference Range  Comments

 

 PT (test code=PT1)  11.9 s  10.0-13.0  

 

 INR (test code=INR)  1.0    

 

 INRH (test code=INRH)       **** SUGGESTED THERAPEUTIC RANGE    



   FOR INR: ****    2.5 - 3.5 **  For    



   Patients with Prosthetic Valves or    



   Patients with    



   recurrent Thromboembolic Events **    



   2.0 - 3.0 ** For Most Other    



   Applications **    



URINALYSIS W/O MICROSCOPIC**OW**2018-10-21 20:26:00





 Test Item  Value  Reference Range  Comments

 

 COLOR (test code=COLU)  Yellow  YELLOW  

 

 CLARITY (test code=CLA)  Clear  CLEAR  

 

 GLUCOSE UR (test code=UA  Negative  NEGATIVE  



 GLUCOSE)      

 

 BILI UR (test code=BILE)  Negative  NEGATIVE  

 

 KETONES UR (test code=ACE)  Trace  NEGATIVE  

 

 SP GRAVITY (test code=SPGR)  1.025  1.005-1.030  

 

 PH UR (test code=PH)  6.0  4.5-8.0  

 

 PROTEIN UR (test code=PU)  Negative  NEGATIVE  

 

 NITRITE UR (test  Negative  NEGATIVE  



 code=NITRITE)      

 

 UROBIL UR (test code=GUROQ)  0.2 E.U./dL    

 

 UROBIL UR (test code=GUROQC)  ***** UROBILINOGEN REFERENCE    



   RANGE *****    



                0.2 - 1.0 EU/dL    

 

 BLOOD UR (test code=UA  Negative  NEGATIVE  



 BLOOD)      

 

 LEUK ES UR (test code=LEUK)  Negative  NEGATIVE  



CHEM8+ i-STAT **OW**2018-10-21 20:25:00





 Test Item  Value  Reference Range  Comments

 

 SODIUM (test code=PEDRO)  142 mmol/L  138-146  

 

 POTASSIUM (test code=KI)  3.7 mmol/L  3.5-4.9  

 

 CHLORIDE (test code=CLI)  112 mmol/L    

 

 CA IONIZED (test code=ICAI)  1.17 mmol/L  1.12-1.32  

 

 GLUCOSE (test code=GLUI)  106 mg/dL    

 

 TCO2 (test code=TCO2)  19 mmol/L  24-29  

 

 BUN (test code=BUN1)  8 mg/dL  8-26  

 

 CREATININE (test code=CREAI)  0.7 mg/dL  0.6-1.3  

 

 ANION GAP (test code=GANG)  16.0 mmol/L    

 

 HGB (test code=MHB)  12.2 g/dL  12.0-17.0  

 

 HCT (test code=MHCT)  36.0 %  38.0-51.0  



CBC  (INCLUDES AUTOMATED DIFFERENTIAL) *2018-10-21 20:24:00





 Test Item  Value  Reference Range  Comments

 

 WBC (test code=WBC)  9.7 10\S\3/uL  4.5-11.0  

 

 RBC (test code=RBC)  4.40 10\S\6/uL  4.30-5.70  

 

 HGB (test code=HBG)  12.1 g/dL  12.0-15.5  

 

 HCT (test code=HCT)  37.3 %  35.0-44.0  

 

 MCV (test code=MCV)  84.8 fL  81.0-99.0  

 

 MCH (test code=MCH)  27.5 pg  27.0-31.0  

 

 MCHC (test code=MCHC)  32.4 g/dL  32.0-36.0  

 

 RDW (test code=RDW)  15.6 %  11.5-14.5  

 

 PLT (test code=PLT)  316 10\S\3/uL  130-400  

 

 MPV (test code=OMPV)  8.6 fL  6.2-10.2  

 

 NEUTROP # (test code=NE#)  6.8 10\S\3/uL  1.6-8.0  

 

 LYMPH # (test code=LY#)  2.0 10\S\3/uL  1.1-3.5  

 

 MID # (test code=GMID#)  0.8 10\S\3/uL  0.0-1.1  

 

 GRA  % (test code=GRA%)  70.6 %  35.0-73.0  

 

 LYMPH % (test code=GLY%)  20.9 %  20.0-55.0  

 

 MID % (test code=GMID%)  8.5 %  0.0-10.0  



CT HEAD W/O CONTRAST *WW*2018 17:32:39Clinical History: Closed head 
injury.Location: D4.Findings: Multislice axial noncontrast images are obtained 
through the head.Comparison is made with previous study dated 2016. No areas ofabnormal density are identified within the brain. There is no 
mass effect.There is no evidence of hydrocephalus. No intra or extra-axial 
hemorrhage orfluid collections are identified. There is no evidence of acute 
infarct. Noabnormalities are noted of the visualized skeletal structures, 
sinuses, or softtissues.Impression:1. No abnormalities are identified.CARDIAC 
PROFILE **2017 04:49:00





 Test Item  Value  Reference Range  Comments

 

 TROPONIN I (test code=A84)  <0.015 ng/mL  0.000-0.045  

 

 CKMB (test code=A49)  <1.0 ng/mL  <=3.6  

 

 CPK (test code=32A)  58 IU/L    



CT CHEST W/O CONTRAST **2017 23:22:05CT CHEST WITHOUT CONTRAST:After 
hours services performed at 2203 hours. LOCATION: [id number]: Chest 
pain.COMPARISON: None.TECHNIQUE: Volumetric CT acquisition of the chest without 
contrast. Axialimages were reconstructed. One or more of the following 
radiation dosereduction techniques was used: automated exposure control, 
adjustment of the mAand/or kV according to patient size, and/or utilization of 
iterativereconstruction technique.FINDINGS:The central airways are patent. The 
lungs are clear. There is no pneumothorax.There is no axillary, mediastinal, or 
hilar lymphadenopathy. The heart andgreat vessels are normal.There are 
postoperative changes of bariatric surgery. Visualized portions ofthe upper 
abdomen are otherwise normal. There is no focal osseous abnormalityin the 
chest.IMPRESSION:No acute abnormality in the chest.CARDIAC PROFILE ** 22:12:00





 Test Item  Value  Reference Range  Comments

 

 TROPONIN I (test code=A84)  <0.015 ng/mL  0.000-0.045  

 

 CKMB (test code=A49)  1.4 ng/mL  <=3.6  

 

 CPK (test code=32A)  68 IU/L    



CARDIAC PROFILE **2017 19:33:00





 Test Item  Value  Reference Range  Comments

 

 TROPONIN I (test code=A84)  <0.015 ng/mL  0.000-0.045  

 

 CKMB (test code=A49)  1.5 ng/mL  <=3.6  

 

 CPK (test code=32A)  107 IU/L    



COMPREHENSIVE METABOLIC PAN **2017 19:33:00





 Test Item  Value  Reference Range  Comments

 

 GLUCOSE (test code=06D)  86 mg/dL    

 

 SODIUM (test code=01A)  142 mmol/L  136-145  

 

 POTASSIUM (test code=01B)  4.0 mmol/L  3.6-5.1  

 

 CHLORIDE (test code=04A)  110 mmol/L    

 

 CO2 (test code=02A)  21 mmol/L  22-32  

 

 ANION GAP (test code=ANG)  15.0 mmol/L    

 

 BUN (test code=05D)  12 mg/dL  7-18  

 

 CREATININE (test code=03E)  0.7 mg/dL  0.4-1.1  

 

 BUN/CREA (test code=BCR)  17  12-20  

 

 CALCIUM (test code=09D)  8.2 mg/dL  8.3-9.5  

 

 BILI TOTAL (test code=11A)  0.2 mg/dL  0.2-1.0  

 

 PROTEIN (test code=07D)  6.8 g/dL  6.4-8.2  

 

 ALBUMIN (test code=08D)  3.2 g/dL  3.5-4.8  

 

 GLOBULIN (test code=GLB)  3.6 g/dL  1.5-3.8  

 

 ALB/GLOB (test code=AGRR)  0.9  1.0-2.6  

 

 ALK PHOS (test code=35A)  102 IU/L    

 

 AST (test code=30A)  28 IU/L  <=42  

 

 ALT (test code=31A)  40 IU/L  <=78  



XR CHEST 1 VIEW PORTABLE **2017 19:19:41EXAM: Portable chest x-
rayLOCATION: [id number]: chest pain.    COMPARISON: chest radiograph  2017DISCUSSION:Lines/tubes: None.The lungs are clear.  The cardiac silhouette 
is within normal limits.Osseous structures are intact.IMPRESSION:No acute 
cardiopulmonary abnormalities.PREGNANCY SERUM MONOCLONAL  **2017 19:18:
00





 Test Item  Value  Reference Range  Comments

 

 PREG SRM (test code=PGS)  NEGATIVE  NEGATIVE  



CBC (INCLUDES AUTOMATED DIFFERENTIAL)*[id number] 19:12:00





 Test Item  Value  Reference Range  Comments

 

 WBC (test code=WBC)  13.5 10\S\3/uL  4.5-11.0  

 

 RBC (test code=RBC)  3.93 10\S\6/uL  4.30-5.70  

 

 HGB (test code=HBG)  11.0 g/dL  12.0-15.5  

 

 HCT (test code=HCT)  34.6 %  35.0-44.0  

 

 MCV (test code=MCV)  88.0 fL  81.0-99.0  

 

 MCH (test code=MCH)  28.0 pg  27.0-31.0  

 

 MCHC (test code=MCHC)  31.8 g/dL  32.0-36.0  

 

 RDW (test code=RDW)  16.2 %  11.5-14.5  

 

 PLT (test code=PLT)  341 10\S\3/uL  130-400  

 

 MPV (test code=MPV)  9.6 fL  9.4-12.4  

 

 NEUTROP # (test code=NE#)  9.2 10\S\3/uL  1.6-8.0  

 

 LYMPH # (test code=LY#)  2.7 10\S\3/uL  1.1-3.5  

 

 MONOCYTE # (test code=MO#)  1.3 10\S\3/uL  0.0-1.1  

 

 EOSINOPH # (test code=EO#)  0.2 10\S\3/uL  0.0-0.7  

 

 BASOPHIL # (test code=BA#)  0.1 10\S\3/uL  0.0-0.3  

 

 IG # (test code=IG#)  0.09 10\S\3/uL  0.00-0.06  

 

 NRBC # (test code=NRBC#)  0.00 10\S\3/uL  0.00-0.01  

 

 NEUTROPH % (test code=NE%)  68.2 %  35.0-73.0  

 

 LYMPH % (test code=LY%)  19.8 %  20.0-55.0  

 

 MONO % (test code=MO%)  9.3 %  2.5-10.0  

 

 EOSINOPH % (test code=EO%)  1.5 %  0.0-5.0  

 

 BASOPHIL % (test code=BA%)  0.5 %  0.0-2.0  

 

 IG % (test code=IG%)  0.7 %  0.0-0.8  

 

 NRBC% (test code=NRBC%)  0.0 %  0.0-0.2  

 

 MANDIFF (test code=WMDIFF)  NO  NO  

 

 RBC MORPH (test code=WRBCMOR)    NORMAL  



TROPONIN I **2017 15:19:00





 Test Item  Value  Reference Range  Comments

 

 TROPONIN I (test code=A84)  <0.015 ng/mL  0.000-0.045  



COMPREHENSIVE METABOLIC PAN **2017 14:28:00





 Test Item  Value  Reference Range  Comments

 

 GLUCOSE (test code=06D)  87 mg/dL    

 

 SODIUM (test code=01A)  138 mmol/L  136-145  

 

 POTASSIUM (test code=01B)  4.1 mmol/L  3.6-5.1  

 

 CHLORIDE (test code=04A)  103 mmol/L    

 

 CO2 (test code=02A)  25 mmol/L  22-32  

 

 ANION GAP (test code=ANG)  14.1 mmol/L    

 

 BUN (test code=05D)  13 mg/dL  7-18  

 

 CREATININE (test code=03E)  0.8 mg/dL  0.4-1.1  

 

 BUN/CREA R (test code=BCR)  17  12-20  

 

 CALCIUM (test code=09D)  8.6 mg/dL  8.3-9.5  

 

 BILI TOTAL (test code=11A)  0.3 mg/dL  0.2-1.0  

 

 PROTEIN (test code=07D)  7.2 g/dL  6.4-8.2  

 

 ALBUMIN (test code=08D)  3.6 g/dL  3.5-4.8  

 

 GLOBULIN (test code=GLB)  3.6 g/dL  1.5-3.8  

 

 ALB/GLOB (test code=AGRR)  1.0  1.0-2.6  

 

 ALK PHOS (test code=35A)  168 IU/L    

 

 AST (test code=30A)  18 IU/L  <=42  

 

 ALT (test code=31A)  25 IU/L  <=78  



CARDIAC PROFILE **2017 14:23:00





 Test Item  Value  Reference Range  Comments

 

 TROPONIN I (test code=A84)  <0.015 ng/mL  0.000-0.045  

 

 CKMB (test code=A49)  <1.0 ng/mL  <=3.6  

 

 CPK (test code=32A)  56 IU/L    



XR CHEST 1 VIEW PORTABLE **2017 14:22:26Portable AP chest, 1 
viewLocation Code: [id number] HISTORY: Chest painCOMPARISON: 2017COMMENT:
The lungs are clear and well inflated. The costophrenic angles are sharp. 
Thecardiomediastinal silhouette is unremarkable. The bones are intact.IMPRESSION
: Stable chest with no acute abnormality.CBC (INCLUDES AUTOMATED DIFFERENTIAL)*
[id number] 14:09:00





 Test Item  Value  Reference Range  Comments

 

 WBC (test code=WBC)  11.5 10\S\3/uL  4.5-11.0  

 

 RBC (test code=RBC)  3.93 10\S\6/uL  4.30-5.70  

 

 HGB (test code=HBG)  9.9 g/dL  12.0-15.5  

 

 HCT (test code=HCT)  33.0 %  35.0-44.0  

 

 MCV (test code=MCV)  84.0 fL  81.0-99.0  

 

 MCH (test code=MCH)  25.2 pg  27.0-31.0  

 

 MCHC (test code=MCHC)  30.0 g/dL  32.0-36.0  

 

 RDW (test code=RDW)  16.3 %  11.5-14.5  

 

 PLT (test code=PLT)  497 10\S\3/uL  130-400  

 

 MPV (test code=MPV)  9.0 fL  9.4-12.4  

 

 NEUTROP # (test code=NE#)  8.0 10\S\3/uL  1.6-8.0  

 

 LYMPH # (test code=LY#)  2.2 10\S\3/uL  1.1-3.5  

 

 MONOCYTE # (test code=MO#)  1.0 10\S\3/uL  0.0-1.1  

 

 EOSINOPH # (test code=EO#)  0.2 10\S\3/uL  0.0-0.7  

 

 BASOPHIL # (test code=BA#)  0.1 10\S\3/uL  0.0-0.3  

 

 IG # (test code=IG#)  0.04 10\S\3/uL  0.00-0.06  

 

 NRBC # (test code=NRBC#)  0.00 10\S\3/uL  0.00-0.01  

 

 NEUTROPH % (test code=NE%)  69.8 %  35.0-73.0  

 

 LYMPH % (test code=LY%)  18.9 %  20.0-55.0  

 

 MONO % (test code=MO%)  8.4 %  2.5-10.0  

 

 EOSINOPH % (test code=EO%)  2.1 %  0.0-5.0  

 

 BASOPHIL % (test code=BA%)  0.5 %  0.0-2.0  

 

 IG % (test code=IG%)  0.3 %  0.0-0.8  

 

 NRBC% (test code=NRBC%)  0.0 %  0.0-0.2  

 

 MANDIFF (test code=WMDIFF)  NO  NO  

 

 RBC MORPH (test code=WRBCMOR)    NORMAL

## 2019-08-19 NOTE — XMS REPORT
Continuity of Care Document

 Created on:February 10, 2014



Patient:ABHIJIT ARCOS

Sex:Female

:1975

External Reference #:[id number]





Demographics







 Address  [address] [place] [address]

 

 Phone  [phone]

 

 Preferred Language  Unknown

 

 Marital Status  Never 

 

 Synagogue Affiliation  Unknown

 

 Race  Unknown

 

 Ethnic Group  Unknown









Author







 Organization  [facility]









Care Team Providers







 Name  Role  Phone

 

 MD Rashad, Lilliam  Unavailable  Unavailable









Insurance Providers







 Payer name  Policy type /  Policy ID  Covered party ID  Policy Rich



   Coverage type      

 

 [facility]        



 [id number]        

 

 AETNA PPO PRIMARY [id number]        

 

 AETNA (PPO)        

 

 AETNA (PPO)        

 

 *SELF PAY*        

 

 AETNA (PPO)        

 

 SLIDING FEE SCHEDULE -        



 DISCOUNT        

 

 AETNA (PPO)        

 

 AETNA (PPO)        

 

 AETNA (PPO)        

 

 AETNA (PPO)        

 

 AETNA (PPO)        

 

 AETNA (PPO)        

 

 AETNA (PPO)        

 

 AETNA (PPO)        

 

 AETNA (PPO)        

 

 AETNA (PPO)        

 

 AETNA (PPO)        

 

 AETNA (PPO)        

 

 AETNA (PPO)        

 

 AETNA (PPO)        

 

 AETNA (PPO)        

 

 AETNA (PPO)        

 

 AETNA (PPO)        

 

 *SELF PAY*        

 

 *SELF PAY*        

 

 *SELF PAY*        

 

 SLIDING FEE SCHEDULE -        



 DISCOUNT        

 

 *SELF PAY*        

 

 SLIDING FEE SCHEDULE -        



 DISCOUNT        

 

 *SELF PAY*        

 

 SLIDING FEE SCHEDULE -        



 DISCOUNT        

 

 [place] ACS - TMHP        



 - TRADITIONAL        

 

 AETNA (PPO)        

 

 *SELF PAY*        

 

 SLIDING FEE SCHEDULE -        



 DISCOUNT        

 

 AETNA (PPO)        

 

 *SELF PAY*        

 

 SLIDING FEE SCHEDULE -        



 DISCOUNT        

 

 [place] ACS - TMHP        



 - TRADITIONAL        

 

 AETNA (PPO)        

 

 *SELF PAY*        

 

 SLIDING FEE SCHEDULE -        



 DISCOUNT        

 

 AETNA (PPO)        

 

 *SELF PAY*        

 

 SLIDING FEE SCHEDULE -        



 DISCOUNT        

 

 AETNA (PPO)        

 

 *SELF PAY*        

 

 SLIDING FEE SCHEDULE -        



 DISCOUNT        

 

 AETNA (PPO)        

 

 *SELF PAY*        

 

 SLIDING FEE SCHEDULE -        



 DISCOUNT        







Encounters







 Encounter  Performer  Location  Date

 

 Lab Report  Lilliam Solorzano MD  [facility] [place]  Feb 
10, 2014







Allergies, Adverse Reactions, Alerts







 Type  Substance  Reaction  Status

 

 Drug allergy  CODEINE    Active

 

 Drug allergy  IODINE    Active

 

 Drug allergy  CIPRO    Active

 

 Food allergy  SANSERT    Active

 

 Drug allergy  MYCIN    Active

 

 Drug allergy  IMITREX    Active







Problems







 Problem  Effective Dates  Problem Status

 

 SPRAIN FOOT  2013  Active

 

 FRACTURE, METATARSUS  2013  Active

 

 SPRAIN, ANKLE  2013  Active

 

 NONCOMPLIANCE W/MED TX PRS HAZARDS HLTH PERS HX  Mar 20, 2013  Active

 

 DEPRESSION    Active

 

 PREGNANCY EXAMINATION OR TEST POSITIVE RESULT  2013  Active

 

 ADVANCED MATERNAL AGE  2013  Active

 

 MIGRAINE HEADACHE  Aug 01, 2013  Active

 

 POSTPARTUM EXAMINATION  2014  Active







Procedures







 Date  Description  Comments

 

 2013  smoking status  never smoker

 

 2003  vaginal Pap smear results  Done

 

 2013  smoking status  negative

 

 Aug 01, 2013  smoking status  unknown if ever smoked

 

 Aug 27, 2013  smoking status  never smoker

 

 2014  smoking status  never smoked







Medications







 Medication  Instructions  Start Date  Status

 

 LORTAB 5-500 MG TABS  1 PO tid PRN HEADACHE  Aug 01, 2013  Inactive

 

 ZITHROMAX 250 MG TABS  Take 2 tablets (500mg) by oral route  Dec 02, 2013  
Inactive



   once daily for 1 day then 1 tablet    



   (250mg)by oral route once daily for 4    



   days.    

 

 AUGMENTIN 500-125 MG TABS  1 PO q 12 hrs  Dec 02, 2013  Inactive

 

 ZOFRAN 8 MG TABS  1 tab PO q 8hrs PRN nausea  2013  Inactive

 

 LORTAB 7.5-500 MG TABS  1 po q 6 hours prn severe headache  Aug 27, 2013  
Inactive

 

 CHIVO CONTOUR TEST STRP  Test 4 times per day.  2013  Inactive

 

 GLYBURIDE 2.5 MG TABS  1 tab PO q am  2013  Inactive







Immunizations







 Vaccine  Date  Status

 

 hepatitis B vaccine #1 given  May 10, 1994  completed

 

 hepatitis B vaccine #2 given  1994  completed

 

 hepatitis B vaccine #3  1994  completed

 

 DPT immunization #1  Mar 07, 1974  completed

 

 DPT immunization #2  May 02, 1974  completed

 

 DPT immunization #3  1974  completed

 

 DPT immunization #4  Aug 27, 1975  completed

 

 DPT immunization #5  Oct 01, 1979  completed

 

 oral polio vaccine (OPV) #1  Mar 07, 1974  completed

 

 oral polio vaccine (OPV) #2  May 02, 1974  completed

 

 oral polio vaccine (OPV) #3  1974  completed

 

 oral polio vaccine (OPV) #4  1975  completed

 

 oral polio vaccine (OPV) #5  Oct 01, 1979  completed

 

 MMR (measles, mumps, rubella) virus immunization #1  1975  completed

 

 MMR (measles, mumps, rubella) virus immunization #2  Aug 30, 1995  completed

 

 dT (Diphtheria and Tetanus) booster given  May 10, 1994  completed

 

 hepatitis B vaccine #1 given  2013  completed







Vital Signs







 Date  Description  Test  Result

 

 2013  height E&M - 8302-2  HEIGHT  62 in

 

 2013  weight E&M - 3141-9  WEIGHT  252 lb

 

 2013  temperature E&M  TEMPERATURE  98.3 deg f

 

 2013  blood pressure, systolic - 8480-6  BP SYSTOLIC  120 mm Hg

 

 2013  blood pressure, diastolic - 8462-4  BP DIASTOLIC  78 mm Hg

 

 2013  weight E&M - 3141-9  WEIGHT  252 lb

 

 2013  temperature E&M  TEMPERATURE  98.8 deg f

 

 2013  blood pressure, systolic - 8480-6  BP SYSTOLIC  120 mm Hg

 

 2013  blood pressure, diastolic - 8462-4  BP DIASTOLIC  68 mm Hg

 

 2013  weight E&M - 3141-9  WEIGHT  252 lb

 

 2013  temperature E&M  TEMPERATURE  98.6 deg f

 

 2013  blood pressure, systolic - 8480-6  BP SYSTOLIC  120 mm Hg

 

 2013  blood pressure, diastolic - 8462-4  BP DIASTOLIC  60 mm Hg

 

 2013  weight E&M - 3141-9  WEIGHT  252 lb

 

 2013  temperature E&M  TEMPERATURE  98.6 deg f

 

 2013  blood pressure, systolic - 8480-6  BP SYSTOLIC  120 mm Hg

 

 2013  blood pressure, diastolic - 8462-4  BP DIASTOLIC  60 mm Hg

 

 Mar 20, 2013  weight E&M - 3141-9  WEIGHT  248 lb

 

 Mar 20, 2013  temperature E&M  TEMPERATURE  98.5 deg f

 

 Mar 20, 2013  blood pressure, systolic - 8480-6  BP SYSTOLIC  120 mm Hg

 

 Mar 20, 2013  blood pressure, diastolic - 8462-4  BP DIASTOLIC  60 mm Hg

 

 2013  weight E&M - 3141-9  WEIGHT  243 lb

 

 2013  blood pressure, systolic - 8480-6  BP SYSTOLIC  122 mm Hg

 

 2013  blood pressure, diastolic - 8462-4  BP DIASTOLIC  70 mm Hg

 

 2013  blood pressure, systolic - 8480-6  BP SYSTOLIC  130 mm Hg

 

 2013  blood pressure, diastolic - 8462-4  BP DIASTOLIC  85 mm Hg

 

 2013  weight E&M - 3141-9  WEIGHT  248 lb

 

 2013  respiratory rate E&M - 9279-1  RESP RATE  19 /min

 

 Aug 01, 2013  weight E&M - 3141-9  WEIGHT  245 lb

 

 Aug 01, 2013  temperature E&M  TEMPERATURE  97.8 deg f

 

 Aug 01, 2013  blood pressure, systolic - 8480-6  BP SYSTOLIC  124 mm Hg

 

 Aug 01, 2013  blood pressure, diastolic - 8462-4  BP DIASTOLIC  74 mm Hg

 

 Aug 01, 2013  pulse rate E&M - 8867-4  PULSE RATE  82 /min

 

 Aug 27, 2013  weight E&M - 3141-9  WEIGHT  249.2 lb

 

 Aug 27, 2013  temperature E&M  TEMPERATURE  98.3 deg f

 

 Aug 27, 2013  pulse rate E&M - 8867-4  PULSE RATE  60 /min

 

 Aug 27, 2013  blood pressure, systolic - 8480-6  BP SYSTOLIC  104 mm Hg

 

 Aug 27, 2013  blood pressure, diastolic - 8462-4  BP DIASTOLIC  60 mm Hg

 

 2014  weight MICHELLE&M - 3141-9  WEIGHT  240 lb

 

 2014  blood pressure, systolic, sitting, left arm  BP SYS SIT L  128 
mm Hg

 

 2014  blood pressure, diastolic, sitting, left arm  BP CHRISTY SIT L  84 
mm Hg

 

 2014  blood pressure, systolic - 8480-6  BP SYSTOLIC  128 mm Hg

 

 2014  blood pressure, diastolic - 8462-4  BP DIASTOLIC  84 mm Hg







Results







 Date  Description  Test Name  Value  Reference  Interpretation  Status

 

 2013  varicella zoster  VARICELLA AB  1.03 null    High  



   antibody, IgG,          



   serum          

 

 2003  vaginal Pap smear  PAP SMEAR  Done null      



   results

## 2019-08-19 NOTE — XMS REPORT
Continuity of Care Document

 Created on:2019



Patient:ABHIJIT ARCOS

Sex:Female

:1975

External Reference #:[id number]





Demographics







 Address  [address] [place] [address]

 

 Home Phone  [phone]

 

 Phone  [phone]

 

 Preferred Language  en-US

 

 Marital Status  Unknown

 

 Pentec[employer]alism Affiliation  Unknown

 

 Race  Unknown

 

 Ethnic Group  Unknown









Author







 Organization  [facility]









Care Team Providers







 Name  Role  Phone

 

 [facility]  Unavailable  Unavailable









Problems







 Problem  Status  Onset  Classification  Date  Comments  Source



     Date    Reported    



             



             



             

 

 PELVIC PAIN  Active  20  Condition  2015     [facility]



     15        Group



             



             

 

 ALLERGIC RHINITIS  Active  20  Condition  2015     Medical



     14        Group



             



             

 

 UNSPECIFIED  Active  20  Condition  2015     Medical



 DISORDER OF    14        Group



 REFRACTION&ACCOMM            



 ODATION            



             

 

 FITTING&ADJUSTMEN  Active  20  Condition  2015     Medical



 T OF    14        Group



 SPECTACLES&CONTAC            



 T LENSES            



             

 

 MIGRAINE  Active  20  Condition  2015     Medical



     14        Group



             



             

 

 POSTPARTUM  Inactive  20  Condition  2015     Medical



 EXAMINATION    14        Group



             



             

 

 MIGRAINE HEADACHE  Active  20  Condition  2015     Medical



     13        Group



             



             

 

 ADVANCED MATERNAL  Active  20  Condition  2015     Medical



 AGE    13        Group



             



             

 

 PREGNANCY  Inactive  20  Condition  2015     Medical



 EXAMINATION OR    13        Group



 TEST POSITIVE            



 RESULT            



             

 

 NONCOMPLIANCE  Active  20  Condition  2015     Medical



 W/MED TX PRS    13        Group



 HAZARDS HLTH PERS            



 HX            



             

 

 SPRAIN, ANKLE  Inactive  20  Condition  2015     Medical



     13        Group



             



             

 

 SPRAIN FOOT  Inactive  20  Condition  2015     Medical



     13        Group



             



             

 

 FRACTURE,  Inactive  20  Condition  2015     Medical



 METATARSUS    13        Group



             



             

 

 DEPRESSION  Active    Condition  2015     Medical



             Group



             



             







Medications







 Medication  Details  Route  Status  Patient  Ordering  Order  Source



         Instructions  Provider  Date  



               



               



               

 

 CYCLOBENZAPRINE  1 po 8hrs prn    Active        



 HCL 10 MG TABS            015  Medical



               Group



               



               

 

 PROMETHAZINE HCL  1 PO Q 4-6    Active        



 25 MG TABS  HOURS AS          015  Medical



   NEEDED            Group



               



               

 

 ABILIFY 2 MG TABS      Active        [facility]



             015  [facility]



               Group



               



               

 

 TRAMADOL HCL 50  1 po q 6hrs    Active        [facility]



 MG TABS  prn pain          015  Medical



               Group



               



               

 

 TRAMADOL HCL 50  1 or 2 po qid    Active        [facility]



 MG TABS  prn pain          015  Medical



               Group



               



               

 

 TRAMADOL HCL 50  1 or 2 po qid    No        [facility]



 MG TABS  prn pain    Longer      015  Medical



       Active        Group



               



               



               

 

 CAMBIA 50 MG PACK  Dissolve 1    Active        [facility]



   pack in one          015  [facility]



   ounce of            Group



   water and            



   drink            



   immediately            



               



               

 

 PROMETHAZINE HCL  1 PO Q 4    No        [facility]



 25 MG TABS  HOURS AS    Longer      015  Medical



   NEEDED FOR    Active        Group



   NAUSEA            



               



               

 

 CAMBIA 50 MG PACK  Dissolve 1    No        [facility]



   pack in one    Longer      015  [facility]



   ounce of    Active        Group



   water and            



   drink            



   immediately            



               



               

 

 PROZAC 40 MG CAPS  take one    Active        [facility]



   daily          014  [facility]



               Group



               



               

 

 WELLBUTRIN   take one    Active        [facility]



 MG XR24H-TAB  daily          014  [facility]



               Group



               



               

 

 CAMBIA 50 MG PACK  DISSOLVE ONE    Active        [facility]



   PACKET IN 1          014  Medical



   OUNCE OF            Group



   WATER AND            



   DRINK            



   IMMEDIATELY            



   AT ONSET OF            



   HEADACHE            



               



               

 

 CYCLOBENZAPRINE  1 PO TID PRN    Active        [facility]



 HCL 10 MG TABS            014  Medical



               Group



               



               

 

 PROMETHAZINE HCL  1 PO Q 4    Active        [facility]



 25 MG TABS  HOURS AS          014  Medical



   NEEDED FOR            Group



   NAUSEA/VOMITI            



   NG            



               

 

 PROMETHAZINE HCL  1 PO Q 4    No        [facility]



 25 MG TABS  HOURS AS    Longer      014  Medical



   NEEDED FOR    Active        Group



   NAUSEA/VOMITI            



   NG            



               

 

 CYCLOBENZAPRINE  1 PO TID PRN    No        [facility]



 HCL 10 MG TABS      Longer      014  Medical



       Active        Group



               



               



               

 

 CAMBIA 50 MG PACK  DISSOLVE ONE    No        [facility]



   PACKET IN 1    Longer      014  Medical



   OUNCE OF    Active        Group



   WATER AND            



   DRINK            



   IMMEDIATELY            



   AT ONSET OF            



   HEADACHE            



               



               

 

 CYCLOBENZAPRINE  1 PO TID PRN    No        [facility]



 HCL 10 MG TABS      Longer      014  Medical



       Active        Group



               



               



               

 

 CAMBIA 50 MG PACK  DISSOLVE ONE    No        [facility]



   PACKET IN 1    Longer      014  Medical



   OUNCE OF    Active        Group



   WATER AND            



   DRINK            



   IMMEDIATELY            



   AT ONSET OF            



   HEADACHE            



               



               

 

 CITRANATAL ASSURE  1 of each q    No        [facility]



 300 MG MISC  day    Longer      014  Medical



       Active        Group



               



               



               

 

 CYCLOBENZAPRINE  1 po TID prn    Active        [facility]



 HCL 10 MG TABS            014  Medical



               Group



               



               

 

 PROMETHAZINE HCL  1 PO Q 6    No        [facility]



 25 MG TABS  HOURS AS    Longer      014  Medical



   NEEDED    Active        Group



               



               



               

 

 BUPAP  MG  1 OR 2 PO Q 4    No        [facility]



 TABS  HOURS AS    Longer      014  Medical



   NEEDED; NOT    Active        Group



   TO EXCEED 6            



   TABS PER DAY            



               



               

 

 CYCLOBENZAPRINE  1 po TID prn    No        [facility]



 HCL 10 MG TABS      Longer      014  Medical



       Active        Group



               



               



               

 

 CYCLOBENZAPRINE  1 po TID prn    No        [facility]



 HCL 10 MG TABS      Longer      014  Medical



       Active        Group



               



               



               

 

 PROMETHAZINE HCL  1 PO Q 6    No        [facility]



 25 MG TABS  HOURS AS    Longer      014  Medical



   NEEDED    Active        Group



               



               



               

 

 CYCLOBENZAPRINE  1 po TID prn    No        [facility]



 HCL 10 MG TABS      Longer      014  Medical



       Active        Group



               



               



               

 

 ZITHROMAX 250 MG  Take 2    No        [facility]



 TABS  tablets    Longer      013  [facility]



   (500mg) by    Active        Group



   oral route            



   once daily            



   for 1 day            



   then 1 tablet            



   (250mg)by            



   oral route            



   once daily            



   for 4 days.            



               



               

 

 AUGMENTIN 500-125  1 PO q 12 hrs    No        [facility]



 MG TABS      Longer      013  [facility]



       Active        Group



               



               



               

 

 GLYBURIDE 2.5 MG  1 tab PO q am    No        [facility]



 TABS      Longer      013  [facility]



       Active        Group



               



               



               

 

 CHIVO CONTOUR  Test 4 times    No        [facility]



 TEST STRP  per day.    Longer      013  Medical



       Active        Group



               



               



               

 

 CHIVO CONTOUR  Test 4 times    No        [facility]



 TEST STRP  per day.    Longer      013  Medical



       Active        Group



               



               



               

 

 LORTAB 7.5-500 MG  1 po q 6    No        [facility]



 TABS  hours prn    Longer      013  [facility]



   severe    Active        Group



   headache            



               



               

 

 LORTAB 5-500 MG  1 PO tid PRN    No        [facility]



 TABS  HEADACHE    Longer      013  [facility]



       Active        Group



               



               



               

 

 ZOFRAN 8 MG TABS  1 tab PO q    No        [facility]



   8hrs PRN    Longer      013  [facility]



   nausea    Active        Group



               



               



               







Allergies, Adverse Reactions, Alerts







 Substance  Category  Reaction  Severity  Reaction  Status  Date  Comments  
Source



         type    Reported    



                 



                 



                 

 

 CODEINE  Drug      CODEINE        



   allergy          3    Medical



                 Group



                 



                 

 

 IODINE  Drug      IODINE        



   allergy          3    Medical



                 Group



                 



                 

 

 CIPRO  Drug      CIPRO        



   allergy          3    Medical



                 Group



                 



                 

 

 SANSERT  Food      SANSERT        



   allergy          3    Medical



                 Group



                 



                 

 

 MYCIN  Drug      MYCIN        



   allergy              Medical



                 Group



                 



                 

 

 IMITREX  Drug      IMITREX        



   allergy              Medical



                 Group



                 



                 







Immunizations







 Immunization  Date Given  Site  Status  Last Updated  Comments  Source



             



             



             

 

 hepatitis B  2013    completed       [facility]



 vaccine #1 given            Group



             



             

 

 hepatitis B  2013    completed       Medical



 vaccine #1            Group



             



             

 

 MMR (measles,  1995    completed       Medical



 mumps, rubella)            Group



 virus            



 immunization #2            



             



             

 

 MMR virus  1995    completed       Medical



 immunization #2            Group



             



             

 

 hepatitis B  1994    completed       Medical



 vaccine #3            Group



             



             

 

 hepatitis B  1994    completed       Medical



 vaccine #2 given            Group



             



             

 

 hepatitis B  1994    completed       Medical



 vaccine #2            Group



             



             

 

 hepatitis B  05/10/1994    completed       Medical



 vaccine #1 given            Group



             



             

 

 dT (Diphtheria  05/10/1994    completed       Medical



 and Tetanus)            Group



 booster given            



             



             

 

 hepatitis B  05/10/1994    completed       Medical



 vaccine #1            Group



             



             

 

 dT (Diphtheria  05/10/1994    completed       Medical



 and Tetanus)            Group



 booster            



             

 

 DPT immunization  10/01/1979    completed       Medical



 #5            Group



             



             

 

 oral polio  10/01/1979    completed       Medical



 vaccine (OPV) #5            Group



             



             

 

 oral polio  1975    completed       Medical



 vaccine (OPV) #4            Group



             



             

 

 DPT immunization  1975    completed       Medical



 #4            Group



             



             

 

 MMR (measles,  1975    completed       Medical



 mumps, rubella)            Group



 virus            



 immunization #1            



             



             

 

 MMR virus  1975    completed       Medical



 immunization #1            Group



             



             

 

 DPT immunization  1974    completed       Medical



 #3            Group



             



             

 

 oral polio  1974    completed       Medical



 vaccine (OPV) #3            Group



             



             

 

 DPT immunization  1974    completed       Medical



 #2            Group



             



             

 

 oral polio  1974    completed       Medical



 vaccine (OPV) #2            Group



             



             

 

 DPT immunization  1974    completed       Medical



 #1            Group



             



             

 

 oral polio  1974    completed       Medical



 vaccine (OPV) #1            Group



             



             







Results







 Order Name  Results  Value  Reference  Date  Interpretation  Comments  Source



       Range        



               



               

 

 Serology  VARICELLA AB  1.03          



               Medical



               Group



               



               

 

 Ob/Gyn  PAP SMEAR  Done          



               Medical



               Group



               



               

 

 Pathology  PAP SMEAR  Done          



               Medical



               Group



               



               







Pathology Reports







 No Data Provided for This Section







Diagnostic Reports







 No Data Provided for This Section







Consultation Notes







 No Data Provided for This Section







Discharge Summaries







 No Data Provided for This Section







History and Physicals







 No Data Provided for This Section







Vital Signs







 Vital Sign  Value  Date  Comments  Source



         

 

 Weight  254  2015     [facility]



         



         

 

 Temperature Oral (F)  98.4 F  2015     [facility]



         



         

 

 Systolic (mm Hg)  128  2015    [facility]



         



         

 

 Diastolic (mm Hg)  86  2015    [facility]



         



         

 

 Heart Rate  90  2015    [facility]



         



         

 

 Respitory Rate  18  2015    [facility]



         



         

 

 Weight  254  2015    [facility]



         



         

 

 Temperature Oral (F)  98.3 F  2015    [facility]



         



         

 

 Systolic (mm Hg)  119  2015    [facility]



         



         

 

 Diastolic (mm Hg)  86  2015    [facility]



         



         

 

 Heart Rate  101  2015    [facility]



         



         

 

 Respitory Rate  16  2015     [facility]



         



         

 

 Weight  256  2015    [facility]



         



         

 

 Systolic (mm Hg)  117  2015    [facility]



         



         

 

 Diastolic (mm Hg)  65  2015    [facility]



         



         

 

 Temperature Oral (F)  98.6 F  2015     [facility]



         



         

 

 Heart Rate  100  2015    [facility]



         



         

 

 Respitory Rate  16  2015     [facility]



         



         

 

 Weight  259  2015    [facility]



         



         

 

 Systolic (mm Hg)  129  2015    [facility]



         



         

 

 Diastolic (mm Hg)  80  2015     [facility]



         



         

 

 Temperature Oral (F)  99.1 F  2015     [facility]



         



         

 

 Heart Rate  88  2015     [facility]



         



         

 

 Respitory Rate  16  2015     [facility]



         



         

 

 Weight  257  2015     [facility]



         



         

 

 Heart Rate  83  2015    [facility]



         



         

 

 Systolic (mm Hg)  114  2015    [facility]



         



         

 

 Diastolic (mm Hg)  69  2015     [facility]



         



         

 

 Temperature Oral (F)  98.8 F  2015     [facility]



         



         

 

 Height  62  2015     [facility]



         



         

 

 Respitory Rate  10  2015     [facility]



         



         

 

 Temperature Oral (F)  98.4 F  2014    [facility]



         



         

 

 Systolic (mm Hg)  131  2014    [facility]



         



         

 

 Diastolic (mm Hg)  80  2014     [facility]



         



         

 

 Heart Rate  80  2014     [facility]



         



         

 

 Height  62  2014     [facility]



         



         

 

 Respitory Rate  12  2014     [facility]



         



         

 

 Weight  255  2014     [facility]



         



         

 

 Temperature Oral (F)  99.1 F  2014    [facility]



         



         

 

 Systolic (mm Hg)  128  2014    [facility]



         



         

 

 Diastolic (mm Hg)  85  2014    [facility]



         



         

 

 Heart Rate  88  2014    [facility]



         



         

 

 Respitory Rate  10  2014    [facility]



         



         

 

 Weight  251  2014    [facility]



         



         

 

 Temperature Oral (F)  98.5 F  2014     [facility]



         



         

 

 Heart Rate  91  2014    [facility]



         



         

 

 Systolic (mm Hg)  129  2014    [facility]



         



         

 

 Diastolic (mm Hg)  85  2014    [facility]



         



         

 

 Respitory Rate  10  2014    [facility]



         



         

 

 Weight  255  2014    [facility]



         



         

 

 Systolic (mm Hg)  125  2014    [facility]



         



         

 

 Diastolic (mm Hg)  87  2014    [facility]



         



         

 

 Weight  240  2014    [facility]



         



         

 

 Systolic (mm Hg)  128  2014    [facility]



         



         

 

 Diastolic (mm Hg)  84  2014     [facility]



         



         

 

 Weight  249.2  2013    [facility]



         



         

 

 Temperature Oral (F)  98.3 F  2013    [facility]



         



         

 

 Heart Rate  60  2013    [facility]



         



         

 

 Systolic (mm Hg)  104  2013    [facility]



         



         

 

 Diastolic (mm Hg)  60  2013    [facility]



         



         

 

 Weight  245  2013    [facility]



         



         

 

 Temperature Oral (F)  97.8 F  2013    [facility]



         



         

 

 Systolic (mm Hg)  124  2013    [facility]



         



         

 

 Diastolic (mm Hg)  74  2013     [facility]



         



         

 

 Heart Rate  82  2013    [facility]



         



         

 

 Systolic (mm Hg)  130  2013    [facility]



         



         

 

 Diastolic (mm Hg)  85  2013    [facility]



         



         

 

 Weight  248  2013    [facility]



         



         

 

 Respitory Rate  19  2013    [facility]



         



         

 

 Weight  243  2013    [facility]



         



         

 

 Systolic (mm Hg)  122  2013    [facility]



         



         

 

 Diastolic (mm Hg)  70  2013    [facility]



         



         

 

 Weight  248  2013    [facility]



         



         

 

 Temperature Oral (F)  98.5 F  2013    [facility]



         



         

 

 Systolic (mm Hg)  120  2013    [facility]



         



         

 

 Diastolic (mm Hg)  60  2013     [facility]



         



         

 

 Weight  252  2013    [facility]



         



         

 

 Temperature Oral (F)  98.6 F  2013    [facility]



         



         

 

 Systolic (mm Hg)  120  2013    [facility]



         



         

 

 Diastolic (mm Hg)  60  2013     [facility]



         



         

 

 Weight  252  2013     [facility]



         



         

 

 Temperature Oral (F)  98.6 F  2013     [facility]



         



         

 

 Systolic (mm Hg)  120  2013     [facility]



         



         

 

 Diastolic (mm Hg)  60  2013     [facility]



         



         

 

 Weight  252  2013     [facility]



         



         

 

 Temperature Oral (F)  98.8 F  2013     [facility]



         



         

 

 Systolic (mm Hg)  120  2013     [facility]



         



         

 

 Diastolic (mm Hg)  68  2013     [facility]



         



         

 

 Height  62  2013     [facility]



         



         

 

 Weight  252  2013     [facility]



         



         

 

 Temperature Oral (F)  98.3 F  2013     [facility]



         



         

 

 Systolic (mm Hg)  120  2013     [facility]



         



         

 

 Diastolic (mm Hg)  78  2013     [facility]



         



         







Encounters







 Location  Location  Encounter  Encounter  Reason  Attending  ADM  DC  Status  
Source



   Details  Type  Number  For  Provider  Date  Date    



         Visit          



                   



                   



                   

 

 [facility]    [facility] Report  [id number]    [facility]      



 Damian Solorzano MD  /2014    [facility]



 [facility]



 Group -                  



 [place]                  



                   



                   

 

 [facility]    Lab Report  [id number]    Lilliam      



 Damian Solorzano MD  /2014    Medical



 [facility]                  [facility]



 Group -                  



 [place]                  



                   



                   

 

 [facility]    Lab Report  [id number]    Lilliam      



 Damian Solorzano MD  /2014    Medical



 [facility]                  Group



 Group -                  



 [place]                  



                   



                   

 

 [facility]    Lab Report  [id number]    Lilliam  02/10  02/10    BROOKS Salgado      96Yuly Solorzano MD  /2014    Medical



 [facility]                  Group



 Group -                  



 [place]                  



                   



                   

 

 [facility]    Lab Report  [id number]    Lilliam      



 Damian Solorzano MD  /2014    Medical



 [facility]                  Group



 Group -                  



 [place]                  



                   



                   

 

 [facility]    Lab Report  [id number]    Lilliam      BROOKS Solorzano MD  /2014    Medical



 [facility]                  Group



 Group -                  



 [place]                  



                   



                   

 

 [facility]    Office  [id number]    [place]      



 [place] [facility]    Visit  Nazario Solorzano MD  /2014    [facility]



 OB-Gyn                  



                   



                   

 

 [facility]    Office  [id number]    [place]illiam      



 [place] [facility]    Visit  Nazario Solorzano MD  /2014    Medical



 [facility]



 [facility]    Office  [phone]    [facility]illi[facility]      [facility]



 [place] [facility]    Visit  800    MD Rashad  /2014    [facility]    Office  [phone]    Mary      [facility]



 [place] [facility]    Visit  570    Alex,      Cuauhtemoc Ramirez          [facility]    Office  [id number]    Dewey      [facility]



 [place] [facility]    Visit  630    LUCERO Ling      [facility]    Office  [id number]    Dewey      [facility]



 [place] [facility]    Visit  270    LUCERO Ling  /2015    [facility]    Office  [phone]    [place]aran      



 [place] [facility]    Visit  030    GIORGIO Harmon      [facility]    Office  [phone]    Karan      



 [place] [facility]    Visit  030    GIORGIO Harmon      [facility]    Office  [phone]    Karan      



 [place] [facility]    Visit  390    GIORGIO Harmon      [facility] [facility]                  



                   



                   







Procedures







 Procedure  Code  Date  Perfomer  Comments  Source



           



           

 

 vaginal Pap smear  [address]  2003    Done   Medical



 results          Group



           







Assessment and Plan







 No Data Provided for This Section







Plan of Care







 No Data Provided for This Section







Social History







 No Data Provided for This Section







Family History







 No Data Provided for This Section







Advance Directives







 No Data Provided for This Section







Functional Status







 No Data Provided for This Section

## 2019-08-19 NOTE — XMS REPORT
Clinical Summary

 Created on:2019



Patient:Karla Oliva

Sex:Female

:1975

External Reference #:[id number]





Demographics







 Address  [address]



   [place] [address]

 

 Home Phone  [phone]

 

 Email Address  [email or web address]

 

 Preferred Language  English

 

 Marital Status  Single

 

 Taoist Affiliation  Unknown

 

 Race  White

 

 Ethnic Group  Not  or 









Author







 Organization  [employer]

 

 Address  [address] [place] [address]









Support







 Name  Relationship  Address  Phone

 

 Lam Oliva  Parent  [address]  [phone]



     [place] [address]  









Care Team Providers







 Name  Role  Phone

 

 Peter Perez MD  Primary Care Provider  [phone]









Allergies







 Active Allergy  Reactions  Severity  Noted Date  Comments

 

 Ciprofloxacin      2017  

 

 Haloperidol      2017  

 

 Sumatriptan Succinate      2017  

 

 Iodine      2017  

 

 Methysergide      2017  







Medications

Not on file



Active Problems

Not on file



Encounters







 Date  Type  Specialty  Care Team  Description

 

 2019  Orders Only  Cardiology  Yazan Mcdaniels Jr., MD  



after 2018



Social History







 Tobacco Use  Types  Packs/Day  Years Used  Date

 

 Never Smoker        









 Smokeless Tobacco: Never Used      









 Alcohol Use  Drinks/Week  oz/Week  Comments

 

 No      









 Sex Assigned at Birth  Date Recorded

 

 Not on file  









 Job Start Date  Occupation  Industry

 

 Not on file  Not on file  Not on file









 Travel History  Travel Start  Travel End









 No recent travel history available.







Last Filed Vital Signs

Not on file



Plan of Treatment







 Health Maintenance  Due Date  Last Done  Comments

 

 INFLUENZA VACCINE  2019    







Procedures







 Procedure Name  Priority  Date/Time  Associated Diagnosis  Comments

 

 ECG 12-LEAD  Routine  2019    



after 2018



Results

ECG 12 lead (2019)





 Narrative  Performed At

 

 This result has an attachment that is not available.



  



after 2018



Insurance







 Payer  Benefit Plan / Group  Subscriber ID  Effective Dates  Phone  Address  
Type

 

 [employer] [place]  xxxxxxxxx  2017-Present      O



   [employer]          









 Guarantor Name  Account Type  Relation to  Date of  Phone  Billing



     Patient  Birth    Address

 

 Karla Oliva  Personal/Family  Self  1975  [phone]  [address]







         (Home)  [place]



           [address]







Advance Directives

For more information, please contact: [phone]





 Type  Date Recorded  Patient Representative  Explanation

 

 Advance Directives,  2016  7:08 PM    



 Living Will and      



 Medical Power of      



       

 

 Advance Directives,  5/3/2016  1:21 PM    



 Living Will and      



 Medical Power of      



       

 

 Advance Directives,  2016  3:38 PM    



 Living Will and      



 Medical Power of      



       

 

 Advance Directives,  2017  9:02 PM    



 Living Will and      



 Medical Power of

## 2019-08-20 NOTE — EKG
Test Date:    2019-08-19               Test Time:    15:04:44

Technician:   LIZ                                     

                                                     

MEASUREMENT RESULTS:                                       

Intervals:                                           

Rate:         75                                     

TX:           110                                    

QRSD:         74                                     

QT:           386                                    

QTc:          431                                    

Axis:                                                

P:            39                                     

TX:           110                                    

QRS:          55                                     

T:            47                                     

                                                     

INTERPRETIVE STATEMENTS:                                       

                                                     

Sinus rhythm with short TX

Otherwise normal ECG

Compared to ECG 08/19/2019 10:54:41

Short TX interval now present

Sinus tachycardia no longer present

ST (T wave) deviation no longer present



Electronically Signed On 08-20-19 07:26:52 CDT by Alfonso Whipple